# Patient Record
Sex: FEMALE | Race: WHITE | NOT HISPANIC OR LATINO | Employment: OTHER | URBAN - NONMETROPOLITAN AREA
[De-identification: names, ages, dates, MRNs, and addresses within clinical notes are randomized per-mention and may not be internally consistent; named-entity substitution may affect disease eponyms.]

---

## 2017-08-08 ENCOUNTER — TELEPHONE (OUTPATIENT)
Dept: MEDICAL GROUP | Facility: CLINIC | Age: 72
End: 2017-08-08

## 2017-08-08 ENCOUNTER — OFFICE VISIT (OUTPATIENT)
Dept: MEDICAL GROUP | Facility: CLINIC | Age: 72
End: 2017-08-08
Payer: MEDICARE

## 2017-08-08 VITALS
HEART RATE: 63 BPM | HEIGHT: 69 IN | DIASTOLIC BLOOD PRESSURE: 68 MMHG | TEMPERATURE: 98.8 F | BODY MASS INDEX: 26.66 KG/M2 | WEIGHT: 180 LBS | SYSTOLIC BLOOD PRESSURE: 126 MMHG | OXYGEN SATURATION: 94 %

## 2017-08-08 DIAGNOSIS — R73.03 PREDIABETES: ICD-10-CM

## 2017-08-08 DIAGNOSIS — Z76.89 ESTABLISHING CARE WITH NEW DOCTOR, ENCOUNTER FOR: ICD-10-CM

## 2017-08-08 DIAGNOSIS — F41.0 PANIC ATTACKS: ICD-10-CM

## 2017-08-08 DIAGNOSIS — Z23 NEED FOR VACCINATION: ICD-10-CM

## 2017-08-08 DIAGNOSIS — M85.88 OSTEOPENIA OF SPINE: ICD-10-CM

## 2017-08-08 DIAGNOSIS — G47.00 INSOMNIA, UNSPECIFIED TYPE: ICD-10-CM

## 2017-08-08 PROCEDURE — 90732 PPSV23 VACC 2 YRS+ SUBQ/IM: CPT | Performed by: NURSE PRACTITIONER

## 2017-08-08 PROCEDURE — 90715 TDAP VACCINE 7 YRS/> IM: CPT | Performed by: NURSE PRACTITIONER

## 2017-08-08 PROCEDURE — G0009 ADMIN PNEUMOCOCCAL VACCINE: HCPCS | Performed by: NURSE PRACTITIONER

## 2017-08-08 PROCEDURE — 90472 IMMUNIZATION ADMIN EACH ADD: CPT | Performed by: NURSE PRACTITIONER

## 2017-08-08 PROCEDURE — 99214 OFFICE O/P EST MOD 30 MIN: CPT | Mod: 25 | Performed by: NURSE PRACTITIONER

## 2017-08-08 NOTE — ASSESSMENT & PLAN NOTE
This is a chronic problem which is fairly well controlled with Prozac 40 mg daily and PRN Xanax, both prescribed by psychiatrist. Her  did pass away in April due to bone cancer, she reports she has been coping fairly well. She lives alone now, denies SI/HI.

## 2017-08-08 NOTE — TELEPHONE ENCOUNTER
Please call to advise patient I have reviewed her DEXA results from 2013. Showed osteopenia in spine, I have ordered new repeat testing to compare. In the meantime, continue with Ca and Vit D as we discussed.   Antonio Mooney, APRN

## 2017-08-08 NOTE — PATIENT INSTRUCTIONS
Please obtain fasting labs prior to your next appointment. You may report to our clinic here in [place] Monday-Friday from 7:00am - 9:00am.     Please arrive fasting. Please do not eat or drink anything other than water for 8 hours prior to your arrival for labs.      Your medical care was provided today by: DANIEL Terry    Thank You for the opportunity to serve you.    You may receive a brief survey in the mail shortly regarding your visit today. Please take a few moments to complete the survey and return it; no postage is necessary. We are working to serve our patient population better, improve customer service and our patients overall experience and your input can help us to accomplish this. We thank you for your help and for the opportunity to serve you today and in the future.     Special Instructions:  Always call 9-1-1 immediately if you develop a life threatening emergency.    Unless told otherwise please take all medications as directed and complete prescription therapies.     Watch for the following signs that require additional evaluation: progressive lethargy or unresponsiveness, localized pain (chest, abdomen), shortness of breath, painful breathing, progressive vomiting with weakness, bloody stools, or new rash.     If you are prescribed pain medication or any other medication that is sedating, do not take medication before or while operating a vehicle or heavy machinery or equipment due to potential side effects such as drowsiness and/or dizziness.

## 2017-08-08 NOTE — PROGRESS NOTES
Chief Complaint   Patient presents with   • Establish Care   • Orders Needed     tdap/pneumonia/bone density        Gabriella Mckee is a 71 y.o. female here today to establish care and to discuss the evaluation and management of:    HPI:      Insomnia  This is a chronic problem which patient reports is well controlled with Mirtazapine 15 mg nightly. This is prescribed by her psychiatrist whom she sees now every 6 months as she is quite stable on her med regime.     Panic attacks  This is a chronic problem which is fairly well controlled with Prozac 40 mg daily and PRN Xanax, both prescribed by psychiatrist. Her  did pass away in April due to bone cancer, she reports she has been coping fairly well. She lives alone now, denies SI/HI.     Prediabetes  Last A1c Jul 2016, 6.2. She has not had follow up since that time. Denies polyuria, polydipsia. She does admit that she likes sweets. Will recheck A1c.       Current medicines (including changes today)  Current Outpatient Prescriptions   Medication Sig Dispense Refill   • fluoxetine (PROZAC) 40 MG capsule Take 40 mg by mouth every day.     • vitamin D (CHOLECALCIFEROL) 1000 UNIT Tab Take 1,000 Units by mouth every day.     • alprazolam (XANAX) 0.5 MG Tab Take 0.5 mg by mouth at bedtime as needed for Sleep.     • mirtazapine (REMERON) 15 MG Tab Take 15 mg by mouth every evening.     • B Complex-C-E-Zn (STRESS B-COMPLEX/VIT C/ZINC PO) Take  by mouth.       No current facility-administered medications for this visit.       She  has a past medical history of Depression; Anxiety; and Panic attack.    She  has past surgical history that includes tubal coagulation laparoscopic bilateral and ganglion excision.     Social History   Substance Use Topics   • Smoking status: Former Smoker -- 1.00 packs/day for 10 years     Quit date: 07/06/1976   • Smokeless tobacco: None   • Alcohol Use: 0.0 oz/week     0 Standard drinks or equivalent per week      Comment: occasionally  "      Social History     Social History Narrative       Family History   Problem Relation Age of Onset   • Alcohol/Drug Mother        Family Status   Relation Status Death Age   • Mother     • Father       unknown        ROS   Constitutional: Denies fever, chills, or sweats  Eyes: negative for visual blurring, double vision, eye pain, floaters and discharge from eyes  ENT: negative for tinnitus, vertigo, frequent URI's, sinus trouble, persistent sore throat  Respiratory: negative for persistent cough, hemoptysis, dyspnea, wheezing  Cardiovascular: negative for palpitations, chest pain, or peripheral edema.  Gastrointestinal: negative for poor appetite, dysphagia, nausea, heartburn, abdominal pain, hemorrhoids, constipation or diarrhea  Genitourinary: negative for dysuria. negative for abnormal uterine bleeding, vaginal discharge   Musculoskeletal: negative for joint swelling and muscle pain/ soreness  Skin: negative for rash, scaling, hair or nail changes.  Neurologic: negative for migraine headaches, involuntary movements or tremor  Psychiatric: negative for excessive alcohol consumption or illegal drug usage, Positive for well controlled insomnia, depression, anxiety, panic attacks. No SI/HI.   Hematologic/Lymphatic/Immunologic: negative for unusual bruising, swollen glands  Endocrine: negative for temperature intolerance, polydipsia, polyuria, unintentional weight changes.        Objective:     Blood pressure 126/68, pulse 63, temperature 37.1 °C (98.8 °F), height 1.753 m (5' 9.02\"), weight 81.647 kg (180 lb), SpO2 94 %. Body mass index is 26.57 kg/(m^2).    Physical Exam:   Constitutional: Alert, no distress.  Eye: Equal, round and reactive, conjunctiva clear, lids normal.  ENMT: Lips without lesions, oropharynx clear.   Neck: Trachea midline, no masses, no thyromegaly. No cervical or supraclavicular lymphadenopathy.   Respiratory: Unlabored respiratory effort, lungs clear to auscultation, no wheezes, no " zahiraale.  Cardiovascular: Normal S1, S2, no murmur, no edema. Capillary refill < 2 seconds in UE bilaterally.   Abdomen: Soft, non-tender, no masses, no hepatosplenomegaly. Normal bowel sounds.   Skin: Warm, dry, good turgor, no rashes in visible areas.  Neuro: CN II-XII grossly intact, normal sensation in extremities.   Psych: Alert and oriented x3, normal affect and mood.      Assessment and Plan:   The following treatment plan was discussed    1. Establishing care with new doctor, encounter for  Reports she did have bone screening completed 2 years ago at [facility]. Will attempt to obtain records. She is taking Vit D. Discussed adding Ca. Denies any diagnosis of osteopenia/osteoporosis.     2. Prediabetes  Recheck labs. Will contact with results.   - LIPID PROFILE; Future  - TSH WITH REFLEX TO FT4; Future  - HEMOGLOBIN A1C; Future  - COMP METABOLIC PANEL; Future    3. Panic attacks  Stable, continue with psych.     4. Insomnia, unspecified type  Stable, continue with psych.     5. Need for vaccination  - Tdap =>8yo IM  - PNEUMOCOCCAL POLYSACCHARIDE VACCINE 23-VALENT =>3YO SQ/IM       Reviewed risks and benefits of treatment plan. Patient verbally agrees to plan of care.     Records requested.    Followup: Return in about 6 months (around 2/8/2018) for Lab follow up.    CELIA Krishnamurthy.     PLEASE NOTE: This dictation was created using voice recognition software. I have made every reasonable attempt to correct obvious errors, but I expect that there may be errors of grammar and possibly content that I did not discover prior finalizing this note.

## 2017-08-08 NOTE — ASSESSMENT & PLAN NOTE
Last A1c Jul 2016, 6.2. She has not had follow up since that time. Denies polyuria, polydipsia. She does admit that she likes sweets. Will recheck A1c.

## 2017-08-08 NOTE — ASSESSMENT & PLAN NOTE
This is a chronic problem which patient reports is well controlled with Mirtazapine 15 mg nightly. This is prescribed by her psychiatrist whom she sees now every 6 months as she is quite stable on her med regime.

## 2017-08-11 ENCOUNTER — NON-PROVIDER VISIT (OUTPATIENT)
Dept: MEDICAL GROUP | Facility: CLINIC | Age: 72
End: 2017-08-11
Payer: MEDICARE

## 2017-08-11 ENCOUNTER — HOSPITAL ENCOUNTER (OUTPATIENT)
Facility: MEDICAL CENTER | Age: 72
End: 2017-08-11
Attending: NURSE PRACTITIONER
Payer: MEDICARE

## 2017-08-11 DIAGNOSIS — R73.03 PREDIABETES: ICD-10-CM

## 2017-08-11 DIAGNOSIS — Z01.89 ROUTINE LAB DRAW: ICD-10-CM

## 2017-08-11 LAB — GFR SERPL CREATININE-BSD FRML MDRD: >60 ML/MIN/1.73 M 2

## 2017-08-11 PROCEDURE — 83036 HEMOGLOBIN GLYCOSYLATED A1C: CPT

## 2017-08-11 PROCEDURE — 80061 LIPID PANEL: CPT | Mod: GZ

## 2017-08-11 PROCEDURE — 99000 SPECIMEN HANDLING OFFICE-LAB: CPT | Performed by: NURSE PRACTITIONER

## 2017-08-11 PROCEDURE — 84443 ASSAY THYROID STIM HORMONE: CPT

## 2017-08-11 PROCEDURE — 80053 COMPREHEN METABOLIC PANEL: CPT

## 2017-08-11 PROCEDURE — 36415 COLL VENOUS BLD VENIPUNCTURE: CPT | Performed by: NURSE PRACTITIONER

## 2017-08-12 LAB
ALBUMIN SERPL BCP-MCNC: 3.7 G/DL (ref 3.2–4.9)
ALBUMIN/GLOB SERPL: 1.2 G/DL
ALP SERPL-CCNC: 75 U/L (ref 30–99)
ALT SERPL-CCNC: 18 U/L (ref 2–50)
ANION GAP SERPL CALC-SCNC: 10 MMOL/L (ref 0–11.9)
AST SERPL-CCNC: 19 U/L (ref 12–45)
BILIRUB SERPL-MCNC: 0.6 MG/DL (ref 0.1–1.5)
BUN SERPL-MCNC: 18 MG/DL (ref 8–22)
CALCIUM SERPL-MCNC: 9.1 MG/DL (ref 8.5–10.5)
CHLORIDE SERPL-SCNC: 109 MMOL/L (ref 96–112)
CHOLEST SERPL-MCNC: 179 MG/DL (ref 100–199)
CO2 SERPL-SCNC: 20 MMOL/L (ref 20–33)
CREAT SERPL-MCNC: 0.73 MG/DL (ref 0.5–1.4)
EST. AVERAGE GLUCOSE BLD GHB EST-MCNC: 131 MG/DL
GLOBULIN SER CALC-MCNC: 3.1 G/DL (ref 1.9–3.5)
GLUCOSE SERPL-MCNC: 111 MG/DL (ref 65–99)
HBA1C MFR BLD: 6.2 % (ref 0–5.6)
HDLC SERPL-MCNC: 47 MG/DL
LDLC SERPL CALC-MCNC: 112 MG/DL
POTASSIUM SERPL-SCNC: 4.1 MMOL/L (ref 3.6–5.5)
PROT SERPL-MCNC: 6.8 G/DL (ref 6–8.2)
SODIUM SERPL-SCNC: 139 MMOL/L (ref 135–145)
TRIGL SERPL-MCNC: 99 MG/DL (ref 0–149)
TSH SERPL DL<=0.005 MIU/L-ACNC: 1.01 UIU/ML (ref 0.3–3.7)

## 2017-12-15 ENCOUNTER — OFFICE VISIT (OUTPATIENT)
Dept: MEDICAL GROUP | Facility: CLINIC | Age: 72
End: 2017-12-15
Payer: MEDICARE

## 2017-12-15 VITALS
OXYGEN SATURATION: 96 % | WEIGHT: 185.8 LBS | TEMPERATURE: 98.6 F | DIASTOLIC BLOOD PRESSURE: 78 MMHG | HEIGHT: 69 IN | SYSTOLIC BLOOD PRESSURE: 130 MMHG | BODY MASS INDEX: 27.52 KG/M2 | HEART RATE: 78 BPM

## 2017-12-15 DIAGNOSIS — R42 VERTIGO: ICD-10-CM

## 2017-12-15 PROCEDURE — 99214 OFFICE O/P EST MOD 30 MIN: CPT | Performed by: FAMILY MEDICINE

## 2017-12-15 RX ORDER — MECLIZINE HYDROCHLORIDE 25 MG/1
25 TABLET ORAL 3 TIMES DAILY PRN
Qty: 30 TAB | Refills: 0 | Status: SHIPPED | OUTPATIENT
Start: 2017-12-15 | End: 2019-02-04

## 2017-12-15 RX ORDER — PHENOL 1.4 %
AEROSOL, SPRAY (ML) MUCOUS MEMBRANE
COMMUNITY
End: 2023-01-03

## 2017-12-15 ASSESSMENT — ENCOUNTER SYMPTOMS
CONSTITUTIONAL NEGATIVE: 1
FEVER: 0
PALPITATIONS: 0
MUSCULOSKELETAL NEGATIVE: 1
DIZZINESS: 1
PSYCHIATRIC NEGATIVE: 1
CONSTIPATION: 0
GASTROINTESTINAL NEGATIVE: 1
CHILLS: 0
NECK PAIN: 0
ABDOMINAL PAIN: 0
EYES NEGATIVE: 1
HEADACHES: 0
CARDIOVASCULAR NEGATIVE: 1
MYALGIAS: 0
COUGH: 0
HEMOPTYSIS: 0
RESPIRATORY NEGATIVE: 1

## 2017-12-15 ASSESSMENT — PATIENT HEALTH QUESTIONNAIRE - PHQ9: CLINICAL INTERPRETATION OF PHQ2 SCORE: 0

## 2017-12-15 NOTE — PROGRESS NOTES
Subjective:      Gabriella Mckee is a 72 y.o. female who presents with Vertigo            1. Vertigo  Had vertigo last week that seems to be resolving quickly  On exam with clear fluid behind both TM's and history of allergies will treat with antivert and rest  - Calcium Carbonate (CALCIUM 600) 600 MG Tab; Take  by mouth.  - meclizine (ANTIVERT) 25 MG Tab; Take 1 Tab by mouth 3 times a day as needed.  Dispense: 30 Tab; Refill: 0    Past Medical History:  No date: Anxiety  No date: Depression  No date: Panic attack  Past Surgical History:  No date: GANGLION EXCISION      Comment: R wrist   No date: TUBAL COAGULATION LAPAROSCOPIC BILATERAL  Smoking status: Former Smoker                                                              Packs/day: 1.00      Years: 10.00        Quit date: 7/6/1976  Smokeless tobacco: Never Used                      Alcohol use: Yes           0.0 oz/week     Comment: occasionally    Review of patient's family history indicates:    Alcohol/Drug                   Mother                      Current Outpatient Prescriptions: •  Calcium Carbonate (CALCIUM 600) 600 MG Tab, Take  by mouth., Disp: , Rfl: •  meclizine (ANTIVERT) 25 MG Tab, Take 1 Tab by mouth 3 times a day as needed., Disp: 30 Tab, Rfl: 0•  fluoxetine (PROZAC) 40 MG capsule, Take 40 mg by mouth every day., Disp: , Rfl: •  vitamin D (CHOLECALCIFEROL) 1000 UNIT Tab, Take 1,000 Units by mouth every day., Disp: , Rfl: •  mirtazapine (REMERON) 15 MG Tab, Take 15 mg by mouth every evening., Disp: , Rfl: •  B Complex-C-E-Zn (STRESS B-COMPLEX/VIT C/ZINC PO), Take  by mouth., Disp: , Rfl: •  alprazolam (XANAX) 0.5 MG Tab, Take 0.5 mg by mouth at bedtime as needed for Sleep., Disp: , Rfl:     Patient was instructed on the use of medications, either prescriptions or OTC and informed on when the appropriate follow up time period should be. In addition, patient was also instructed that should any acute worsening occur that they should notify this  "clinic asa or call 911.          Dizziness   This is a new problem. The current episode started in the past 7 days. The problem occurs intermittently. The problem has been rapidly improving. Pertinent negatives include no abdominal pain, chest pain, chills, coughing, fever, headaches, myalgias, neck pain or rash. The symptoms are aggravated by exertion. She has tried rest for the symptoms. The treatment provided mild relief.       Review of Systems   Constitutional: Negative.  Negative for chills and fever.        Past Medical History:  No date: Anxiety  No date: Depression  No date: Panic attack  Past Surgical History:  No date: GANGLION EXCISION      Comment: R wrist   No date: TUBAL COAGULATION LAPAROSCOPIC BILATERAL  Smoking status: Former Smoker                                                              Packs/day: 1.00      Years: 10.00        Quit date: 7/6/1976  Smokeless tobacco: Never Used                      Alcohol use: Yes           0.0 oz/week     Comment: occasionally    Review of patient's family history indicates:    Alcohol/Drug                   Mother                     HENT: Negative.    Eyes: Negative.    Respiratory: Negative.  Negative for cough and hemoptysis.    Cardiovascular: Negative.  Negative for chest pain and palpitations.   Gastrointestinal: Negative.  Negative for abdominal pain and constipation.   Genitourinary: Negative.  Negative for dysuria and urgency.   Musculoskeletal: Negative.  Negative for myalgias and neck pain.   Skin: Negative.  Negative for rash.   Neurological: Positive for dizziness. Negative for headaches.   Endo/Heme/Allergies: Negative.    Psychiatric/Behavioral: Negative.  Negative for suicidal ideas.          Objective:     /78   Pulse 78   Temp 37 °C (98.6 °F)   Ht 1.753 m (5' 9\")   Wt 84.3 kg (185 lb 12.8 oz)   SpO2 96%   BMI 27.44 kg/m²      Physical Exam   Constitutional: She is oriented to person, place, and time. She appears well-developed " and well-nourished. No distress.   HENT:   Head: Normocephalic and atraumatic.   Right Ear: A middle ear effusion is present.   Left Ear: A middle ear effusion is present.   Mouth/Throat: Oropharynx is clear and moist. No oropharyngeal exudate.   Eyes: Pupils are equal, round, and reactive to light.   Cardiovascular: Normal rate, regular rhythm, normal heart sounds and intact distal pulses.  Exam reveals no gallop and no friction rub.    No murmur heard.  Pulmonary/Chest: Effort normal and breath sounds normal. No respiratory distress. She has no wheezes. She has no rales. She exhibits no tenderness.   Neurological: She is alert and oriented to person, place, and time.   Skin: She is not diaphoretic.   Psychiatric: She has a normal mood and affect. Her behavior is normal. Judgment and thought content normal.   Nursing note and vitals reviewed.              Assessment/Plan:     1. Vertigo    - Calcium Carbonate (CALCIUM 600) 600 MG Tab; Take  by mouth.  - meclizine (ANTIVERT) 25 MG Tab; Take 1 Tab by mouth 3 times a day as needed.  Dispense: 30 Tab; Refill: 0

## 2018-03-17 ENCOUNTER — OFFICE VISIT (OUTPATIENT)
Dept: MEDICAL GROUP | Facility: CLINIC | Age: 73
End: 2018-03-17
Payer: MEDICARE

## 2018-03-17 VITALS
DIASTOLIC BLOOD PRESSURE: 70 MMHG | OXYGEN SATURATION: 96 % | TEMPERATURE: 98.7 F | HEART RATE: 60 BPM | RESPIRATION RATE: 16 BRPM | HEIGHT: 69 IN | SYSTOLIC BLOOD PRESSURE: 126 MMHG | BODY MASS INDEX: 27.77 KG/M2 | WEIGHT: 187.5 LBS

## 2018-03-17 DIAGNOSIS — R42 VERTIGO: ICD-10-CM

## 2018-03-17 DIAGNOSIS — J30.1 CHRONIC SEASONAL ALLERGIC RHINITIS DUE TO POLLEN: ICD-10-CM

## 2018-03-17 DIAGNOSIS — H65.04 RECURRENT ACUTE SEROUS OTITIS MEDIA OF RIGHT EAR: ICD-10-CM

## 2018-03-17 PROCEDURE — 99214 OFFICE O/P EST MOD 30 MIN: CPT | Performed by: PHYSICIAN ASSISTANT

## 2018-03-17 RX ORDER — MOMETASONE FUROATE 50 UG/1
2 SPRAY, METERED NASAL DAILY
Qty: 1 INHALER | Refills: 0 | Status: SHIPPED | OUTPATIENT
Start: 2018-03-17 | End: 2019-02-04

## 2018-03-17 RX ORDER — AMOXICILLIN 875 MG/1
875 TABLET, COATED ORAL 2 TIMES DAILY
Qty: 20 TAB | Refills: 0 | Status: SHIPPED | OUTPATIENT
Start: 2018-03-17 | End: 2018-03-27

## 2018-03-17 ASSESSMENT — ENCOUNTER SYMPTOMS
CHILLS: 0
TREMORS: 0
HEADACHES: 0
FEVER: 0
BLURRED VISION: 0
DOUBLE VISION: 0
LOSS OF CONSCIOUSNESS: 0
SEIZURES: 0
FOCAL WEAKNESS: 0
RHINORRHEA: 0
SORE THROAT: 0
SINUS PAIN: 0
COUGH: 0

## 2018-03-17 NOTE — PROGRESS NOTES
"Subjective:   Gabriella Mckee is a 72 y.o. female who presents for Otalgia (right ear)        Otalgia    There is pain in the right ear. This is a new problem. The current episode started more than 1 month ago. The problem occurs every few hours. The problem has been gradually worsening. There has been no fever. The pain is mild. Associated symptoms include ear discharge. Pertinent negatives include no coughing, headaches, hearing loss, rash, rhinorrhea or sore throat.   Pt was seen in December with sudden onset of Vertigo. Given Meclizine. Reports that the Vertigo has slowly improved but is not 100% resolved. When she tilts her head backwards she will become extrememly dizzy. Now over the past week or so she has begun to experience intermittent pain in the left ear with fullness. No real loss of hearing. Denies headaches, syncope or head injury.   Review of Systems   Constitutional: Negative for chills, fever and malaise/fatigue.   HENT: Positive for congestion, ear discharge and ear pain. Negative for hearing loss, rhinorrhea, sinus pain, sore throat and tinnitus.         Pt reports that she is struggling with nasal congestion which she feels is related to allergies. She thinks she is allergic to grass. She has horses and is exposed to grass frequently.    Eyes: Negative for blurred vision and double vision.   Respiratory: Negative for cough.    Cardiovascular: Negative for chest pain.   Skin: Negative for rash.   Neurological: Negative for tremors, focal weakness, seizures, loss of consciousness and headaches.   All other systems reviewed and are negative.    No Known Allergies   Objective:   /70   Pulse 60   Temp 37.1 °C (98.7 °F)   Resp 16   Ht 1.753 m (5' 9\")   Wt 85 kg (187 lb 8 oz)   SpO2 96%   BMI 27.69 kg/m²   Physical Exam   Constitutional: She is oriented to person, place, and time. She appears well-developed and well-nourished.   HENT:   Head: Normocephalic and atraumatic.   Right Ear: " Hearing, external ear and ear canal normal. Tympanic membrane is retracted.   Left Ear: Hearing, external ear and ear canal normal.   Nose: Nose normal.   Mouth/Throat: Oropharynx is clear and moist. No oropharyngeal exudate.     Right TM dull, retracted with 75% yellow jordan AFL   Eyes: Conjunctivae and EOM are normal. Pupils are equal, round, and reactive to light.   Neck: Normal range of motion. Neck supple.   Cardiovascular: Normal rate, regular rhythm and normal heart sounds.  Exam reveals no gallop and no friction rub.    No murmur heard.  Pulmonary/Chest: Effort normal and breath sounds normal. No respiratory distress. She has no wheezes. She has no rales.   Abdominal: Soft. Bowel sounds are normal. She exhibits no distension and no mass. There is no tenderness. There is no rebound and no guarding.   Musculoskeletal: Normal range of motion. She exhibits no edema, tenderness or deformity.   Lymphadenopathy:     She has no cervical adenopathy.   Neurological: She is alert and oriented to person, place, and time. She has normal strength and normal reflexes. No cranial nerve deficit or sensory deficit. She displays a negative Romberg sign. Coordination and gait normal.   Skin: Skin is warm and dry. No rash noted.   Psychiatric: She has a normal mood and affect. Judgment normal.         Assessment/Plan:   Assessment    1. Recurrent acute serous otitis media of right ear  - REFERRAL TO ENT  Amoxicillin 875 mg bid x 10 days.     2. Vertigo  - REFERRAL TO ENT    3. Chronic seasonal allergic rhinitis due to pollen    Nasonex nasal spray 2 sprays each nostril qd.   Add OTC Zytrec, claritin or allegra to regimen.    Differential diagnosis, natural history, supportive care, and indications for immediate follow-up discussed.

## 2018-03-17 NOTE — PATIENT INSTRUCTIONS
Vertigo  Introduction  Vertigo means that you feel like you are moving when you are not. Vertigo can also make you feel like things around you are moving when they are not. This feeling can come and go at any time. Vertigo often goes away on its own.  Follow these instructions at home:  · Avoid making fast movements.  · Avoid driving.  · Avoid using heavy machinery.  · Avoid doing any task or activity that might cause danger to you or other people if you would have a vertigo attack while you are doing it.  · Sit down right away if you feel dizzy or have trouble with your balance.  · Take over-the-counter and prescription medicines only as told by your doctor.  · Follow instructions from your doctor about which positions or movements you should avoid.  · Drink enough fluid to keep your pee (urine) clear or pale yellow.  · Keep all follow-up visits as told by your doctor. This is important.  Contact a doctor if:  · Medicine does not help your vertigo.  · You have a fever.  · Your problems get worse or you have new symptoms.  · Your family or friends see changes in your behavior.  · You feel sick to your stomach (nauseous) or you throw up (vomit).  · You have a “pins and needles” feeling or you are numb in part of your body.  Get help right away if:  · You have trouble moving or talking.  · You are always dizzy.  · You pass out (faint).  · You get very bad headaches.  · You feel weak or have trouble using your hands, arms, or legs.  · You have changes in your hearing.  · You have changes in your seeing (vision).  · You get a stiff neck.  · Bright light starts to bother you.  This information is not intended to replace advice given to you by your health care provider. Make sure you discuss any questions you have with your health care provider.  Document Released: 09/26/2009 Document Revised: 05/25/2017 Document Reviewed: 04/11/2016  © 2017 Elsevier  Serous Otitis Media  Serous otitis media is fluid in the middle ear  space. This space contains the bones for hearing and air. Air in the middle ear space helps to transmit sound.   The air gets there through the eustachian tube. This tube goes from the back of the nose (nasopharynx) to the middle ear space. It keeps the pressure in the middle ear the same as the outside world. It also helps to drain fluid from the middle ear space.  CAUSES   Serous otitis media occurs when the eustachian tube gets blocked. Blockage can come from:  · Ear infections.  · Colds and other upper respiratory infections.  · Allergies.  · Irritants such as cigarette smoke.  · Sudden changes in air pressure (such as descending in an airplane).  · Enlarged adenoids.  · A mass in the nasopharynx.  During colds and upper respiratory infections, the middle ear space can become temporarily filled with fluid. This can happen after an ear infection also. Once the infection clears, the fluid will generally drain out of the ear through the eustachian tube. If it does not, then serous otitis media occurs.  SIGNS AND SYMPTOMS   · Hearing loss.  · A feeling of fullness in the ear, without pain.  · Young children may not show any symptoms but may show slight behavioral changes, such as agitation, ear pulling, or crying.  DIAGNOSIS   Serous otitis media is diagnosed by an ear exam. Tests may be done to check on the movement of the eardrum. Hearing exams may also be done.  TREATMENT   The fluid most often goes away without treatment. If allergy is the cause, allergy treatment may be helpful. Fluid that persists for several months may require minor surgery. A small tube is placed in the eardrum to:  · Drain the fluid.  · Restore the air in the middle ear space.  In certain situations, antibiotic medicines are used to avoid surgery. Surgery may be done to remove enlarged adenoids (if this is the cause).  HOME CARE INSTRUCTIONS   · Keep children away from tobacco smoke.  · Keep all follow-up visits as directed by your health  care provider.  SEEK MEDICAL CARE IF:   · Your hearing is not better in 3 months.  · Your hearing is worse.  · You have ear pain.  · You have drainage from the ear.  · You have dizziness.  · You have serous otitis media only in one ear or have any bleeding from your nose (epistaxis).  · You notice a lump on your neck.  MAKE SURE YOU:  · Understand these instructions.    · Will watch your condition.    · Will get help right away if you are not doing well or get worse.    This information is not intended to replace advice given to you by your health care provider. Make sure you discuss any questions you have with your health care provider.  Document Released: 03/09/2005 Document Revised: 01/08/2016 Document Reviewed: 07/15/2014  Elsevier Interactive Patient Education © 2017 [employer]

## 2018-05-18 ENCOUNTER — APPOINTMENT (OUTPATIENT)
Dept: SCHEDULING | Facility: IMAGING CENTER | Age: 73
End: 2018-05-18
Payer: MEDICARE

## 2019-01-30 ENCOUNTER — TELEPHONE (OUTPATIENT)
Dept: HEALTH INFORMATION MANAGEMENT | Facility: OTHER | Age: 74
End: 2019-01-30

## 2019-01-30 DIAGNOSIS — M85.89 OSTEOPENIA OF MULTIPLE SITES: ICD-10-CM

## 2019-01-30 NOTE — TELEPHONE ENCOUNTER
This patient is overdue for health maintenance topics that need orders so she can complete them.     Please review the pended orders in  to sign or make adjustments as appropriate.    Close the encounter when complete.  If declining these orders, please document a reason and route to the Outreach MA pool (p AMB  Outreach).  I will call the patient to cancel the appointment.

## 2019-02-01 NOTE — TELEPHONE ENCOUNTER
I have not seen this patient in over a year, please call to schedule an appt. Thank you.   DANIEL Terry

## 2019-02-04 ENCOUNTER — OFFICE VISIT (OUTPATIENT)
Dept: MEDICAL GROUP | Facility: CLINIC | Age: 74
End: 2019-02-04
Payer: MEDICARE

## 2019-02-04 VITALS
DIASTOLIC BLOOD PRESSURE: 84 MMHG | HEART RATE: 75 BPM | OXYGEN SATURATION: 95 % | BODY MASS INDEX: 27.55 KG/M2 | WEIGHT: 186 LBS | TEMPERATURE: 99 F | HEIGHT: 69 IN | RESPIRATION RATE: 14 BRPM | SYSTOLIC BLOOD PRESSURE: 128 MMHG

## 2019-02-04 DIAGNOSIS — G47.00 INSOMNIA, UNSPECIFIED TYPE: ICD-10-CM

## 2019-02-04 DIAGNOSIS — F41.0 PANIC ATTACKS: ICD-10-CM

## 2019-02-04 DIAGNOSIS — Z28.20 VACCINE REFUSED BY PATIENT: ICD-10-CM

## 2019-02-04 DIAGNOSIS — F33.1 MODERATE EPISODE OF RECURRENT MAJOR DEPRESSIVE DISORDER (HCC): ICD-10-CM

## 2019-02-04 DIAGNOSIS — Z00.00 MEDICARE ANNUAL WELLNESS VISIT, SUBSEQUENT: Primary | ICD-10-CM

## 2019-02-04 DIAGNOSIS — N39.3 STRESS INCONTINENCE: ICD-10-CM

## 2019-02-04 DIAGNOSIS — M85.89 OSTEOPENIA OF MULTIPLE SITES: ICD-10-CM

## 2019-02-04 DIAGNOSIS — R73.03 PREDIABETES: ICD-10-CM

## 2019-02-04 PROCEDURE — G0439 PPPS, SUBSEQ VISIT: HCPCS | Performed by: NURSE PRACTITIONER

## 2019-02-04 RX ORDER — NAPROXEN 500 MG/1
TABLET ORAL
Refills: 0 | COMMUNITY
Start: 2018-12-03 | End: 2023-01-03

## 2019-02-04 RX ORDER — AMOXICILLIN 500 MG/1
CAPSULE ORAL
Refills: 0 | COMMUNITY
Start: 2018-12-03 | End: 2019-02-04

## 2019-02-04 ASSESSMENT — ENCOUNTER SYMPTOMS: GENERAL WELL-BEING: GOOD

## 2019-02-04 ASSESSMENT — PATIENT HEALTH QUESTIONNAIRE - PHQ9: CLINICAL INTERPRETATION OF PHQ2 SCORE: 0

## 2019-02-04 ASSESSMENT — ACTIVITIES OF DAILY LIVING (ADL): BATHING_REQUIRES_ASSISTANCE: 0

## 2019-02-04 NOTE — ASSESSMENT & PLAN NOTE
This is a chronic problem which is fairly well controlled with as needed Xanax use, prescribed by psychiatrist.  She reports that typically driving is 1 of her triggers.  Denies any SI/HI.

## 2019-02-04 NOTE — ASSESSMENT & PLAN NOTE
This is a chronic problem which is well controlled with mirtazapine 15 mg nightly.  She is prescribed by her psychiatrist whom she sees every 6 months, reports she is stable.

## 2019-02-04 NOTE — PATIENT INSTRUCTIONS
Your medical care was provided today by: DANIEL Terry    Thank You for the opportunity to serve you.    You may receive a brief survey in the mail shortly regarding your visit today. Please take a few moments to complete the survey and return it; no postage is necessary. We are working to serve our patient population better, improve customer service and our patients overall experience and your input can help us to accomplish this. We thank you for your help and for the opportunity to serve you today and in the future.     Labs and Diagnostic Testing   Please note that if we have ordered labs or diagnostic testing, those results may be released to you on [employer] prior to my review. While we do our best to review your results as soon as possible, there are times when you may see your results prior to provider review. Of course, if you ever have any questions or concerns about your results, please contact our clinic.     Special Instructions:  Always call 9-1-1 immediately if you develop a life threatening emergency.    Unless told otherwise please take all medications as directed and complete prescription therapies.     Watch for the following signs that require additional evaluation: progressive lethargy or unresponsiveness, localized pain (chest, abdomen), shortness of breath, painful breathing, progressive vomiting with weakness, bloody stools, or new rash.     If you are prescribed pain medication or any other medication that is sedating, do not take medication before or while operating a vehicle or heavy machinery or equipment due to potential side effects such as drowsiness and/or dizziness.

## 2019-02-04 NOTE — ASSESSMENT & PLAN NOTE
This is a new problem as of a few months ago.  Patient reports that she utilizes poise pads.  Denies any dysuria, blood in urine.

## 2019-02-04 NOTE — NON-PROVIDER
Chief Complaint   Patient presents with   • Annual Exam         HPI:  Gabriella Mckee is a 73 y.o. here for Medicare Annual Wellness Visit     Patient Active Problem List    Diagnosis Date Noted   • Panic attacks 07/06/2016   • Insomnia 07/06/2016   • Prediabetes 07/06/2016       Current Outpatient Prescriptions   Medication Sig Dispense Refill   • Calcium Carbonate (CALCIUM 600) 600 MG Tab Take  by mouth.     • fluoxetine (PROZAC) 40 MG capsule Take 40 mg by mouth every day.     • vitamin D (CHOLECALCIFEROL) 1000 UNIT Tab Take 1,000 Units by mouth every day.     • alprazolam (XANAX) 0.5 MG Tab Take 0.5 mg by mouth at bedtime as needed for Sleep.     • B Complex-C-E-Zn (STRESS B-COMPLEX/VIT C/ZINC PO) Take  by mouth.     • amoxicillin (AMOXIL) 500 MG Cap   0   • naproxen (NAPROSYN) 500 MG Tab   0   • mometasone (NASONEX) 50 MCG/ACT nasal spray Spray 2 Sprays in nose every day. (Patient not taking: Reported on 2/4/2019) 1 Inhaler 0   • meclizine (ANTIVERT) 25 MG Tab Take 1 Tab by mouth 3 times a day as needed. (Patient not taking: Reported on 2/4/2019) 30 Tab 0   • mirtazapine (REMERON) 15 MG Tab Take 15 mg by mouth every evening.       No current facility-administered medications for this visit.             Current supplements as per medication list.       Allergies: Patient has no known allergies.    Current social contact/activities:     She  reports that she quit smoking about 42 years ago. She has a 10.00 pack-year smoking history. She has never used smokeless tobacco. She reports that she drinks alcohol. She reports that she does not use drugs.  Counseling given: Not Answered      DPA/Advanced Directive:  Patient has Living Will, but it is not on file. Instructed to bring in a copy to scan into their chart.    ROS:    Gait: Uses no assistive device  Ostomy: No  Other tubes: No  Amputations: No  Chronic oxygen use: No  Last eye exam: march 2018  Wears hearing aids: No   : Denies any urinary leakage during the  last 6 months    Screening:    Depression Screening    Little interest or pleasure in doing things?  0 - not at all  Feeling down, depressed , or hopeless? 0 - not at all  Patient Health Questionnaire Score: 0     If depressive symptoms identified deferred to follow up visit unless specifically addressed in assessment and plan.    Interpretation of PHQ-9 Total Score   Score Severity   1-4 No Depression   5-9 Mild Depression   10-14 Moderate Depression   15-19 Moderately Severe Depression   20-27 Severe Depression    Screening for Cognitive Impairment    Three Minute Recall (leader, season, table) 2/3    Regna clock face with all 12 numbers and set the hands to show 10 past 11.  Yes    Cognitive concerns identified deferred for follow up unless specifically addressed in assessment and plan.    Fall Risk Assessment    Has the patient had two or more falls in the last year or any fall with injury in the last year?  No    Safety Assessment    Throw rugs on floor.  No  Handrails on all stairs.  Yes  Good lighting in all hallways.  Yes  Difficulty hearing.  Yes  Patient counseled about all safety risks that were identified.    Functional Assessment ADLs    Are there any barriers preventing you from cooking for yourself or meeting nutritional needs?  No.    Are there any barriers preventing you from driving safely or obtaining transportation?  No.    Are there any barriers preventing you from using a telephone or calling for help?  No.    Are there any barriers preventing you from shopping?  No.    Are there any barriers preventing you from taking care of your own finances?  No.    Are there any barriers preventing you from managing your medications?  No.    Are there any barriers preventing you from showering, bathing or dressing yourself?  No.    Are you currently engaging in any exercise or physical activity?  Yes.     What is your perception of your health?  Good.      Health Maintenance Summary                IMM  "ZOSTER VACCINES Overdue 8/4/2016      Done 6/9/2016 Imm Admin: Zoster Vaccine Live (ZVL) (Zostavax)    MAMMOGRAM Overdue 9/13/2017      Done 9/13/2016 [id number]    BONE DENSITY Overdue 4/4/2018      Done 4/4/2013 DS-BONE DENSITY STUDY (DEXA)    Annual Wellness Visit Overdue 8/9/2018      Done 8/8/2017 Prob Dx: Medicare annual wellness visit, subsequent     Patient has more history with this topic...    IMM INFLUENZA Overdue 9/1/2018      Done 10/22/2013 Ext Imm: Influenza, High Dose    COLONOSCOPY Next Due 9/25/2021      Done 9/25/2016 REFERRAL TO GI FOR COLONOSCOPY    IMM DTaP/Tdap/Td Vaccine Next Due 8/8/2027      Done 8/8/2017 Imm Admin: Tdap Vaccine          Patient Care Team:  JOHNATHON Krishnamurthy as PCP - General (Family Medicine)        Social History   Substance Use Topics   • Smoking status: Former Smoker     Packs/day: 1.00     Years: 10.00     Quit date: 7/6/1976   • Smokeless tobacco: Never Used   • Alcohol use 0.0 oz/week      Comment: occasionally     Family History   Problem Relation Age of Onset   • Alcohol/Drug Mother      She  has a past medical history of Anxiety; Depression; and Panic attack.   Past Surgical History:   Procedure Laterality Date   • GANGLION EXCISION      R wrist    • TUBAL COAGULATION LAPAROSCOPIC BILATERAL         Exam:   Blood pressure 128/84, pulse 75, temperature 37.2 °C (99 °F), temperature source Temporal, resp. rate 14, height 1.753 m (5' 9\"), weight 84.4 kg (186 lb), SpO2 95 %. Body mass index is 27.47 kg/m².    Hearing good.    Dentition good  Alert, oriented in no acute distress.  Eye contact is good, speech goal directed, affect calm    Assessment and Plan. The following treatment and monitoring plan is recommended:    No diagnosis found.      Services suggested: No services needed at this time  Health Care Screening: Age-appropriate preventive services recommended by USPTF and ACIP covered by Medicare were discussed today. Services ordered if " indicated and agreed upon by the patient.  Referrals offered: Community-based lifestyle interventions to reduce health risks and promote self-management and wellness, fall prevention, nutrition, physical activity, tobacco-use cessation, weight loss, and mental health services as per orders if indicated.    Discussion today about general wellness and lifestyle habits:    · Prevent falls and reduce trip hazards; Cautioned about securing or removing rugs.  · Have a working fire alarm and carbon monoxide detector;   · Engage in regular physical activity and social activities     Follow-up: No Follow-up on file.

## 2019-02-04 NOTE — PROGRESS NOTES
Chief Complaint   Patient presents with   • Annual Exam         HPI:  Gabriella is a 73 y.o. here for Medicare Annual Wellness Visit     Moderate episode of recurrent major depressive disorder (HCC)  This is a chronic problem which is well controlled with fluoxetine 40 mg daily.  She is prescribed and followed by a psychiatrist every 6 months.    Insomnia  This is a chronic problem which is well controlled with mirtazapine 15 mg nightly.  She is prescribed by her psychiatrist whom she sees every 6 months, reports she is stable.    Osteopenia of multiple sites  Patient is currently scheduled for her DEXA scan, her last was in 2013.    Panic attacks  This is a chronic problem which is fairly well controlled with as needed Xanax use, prescribed by psychiatrist.  She reports that typically driving is 1 of her triggers.  Denies any SI/HI.    Prediabetes  Last A1c August 2017, this was stable at the time at 6.2.  She denies any polyuria, polydipsia.  She is due for recheck.    Stress incontinence  This is a new problem as of a few months ago.  Patient reports that she utilizes poise pads.  Denies any dysuria, blood in urine.      Patient Active Problem List    Diagnosis Date Noted   • Moderate episode of recurrent major depressive disorder (HCC) 02/04/2019   • Stress incontinence 02/04/2019   • Osteopenia of multiple sites 02/04/2019   • Panic attacks 07/06/2016   • Insomnia 07/06/2016   • Prediabetes 07/06/2016       Current Outpatient Prescriptions   Medication Sig Dispense Refill   • Calcium Carbonate (CALCIUM 600) 600 MG Tab Take  by mouth.     • fluoxetine (PROZAC) 40 MG capsule Take 40 mg by mouth every day.     • alprazolam (XANAX) 0.5 MG Tab Take 0.5 mg by mouth at bedtime as needed for Sleep.     • mirtazapine (REMERON) 15 MG Tab Take 15 mg by mouth every evening.     • B Complex-C-E-Zn (STRESS B-COMPLEX/VIT C/ZINC PO) Take  by mouth.     • naproxen (NAPROSYN) 500 MG Tab   0     No current facility-administered  medications for this visit.             Current supplements as per medication list.       Allergies: Patient has no known allergies.    Current social contact/activities: she does enjoy walking her dog every morning, she also runs a local horse cludb.      She    reports that she quit smoking about 42 years ago. She has a 10.00 pack-year smoking history. She has never used smokeless tobacco. She reports that she drinks alcohol. She reports that she does not use drugs.  Counseling given: Yes        DPA/Advanced directive: Patient does have an advanced directive. If not on file, instructed to bring in a copy to scan into their chart. If no advanced directive exists, a packet and workshop information was given on advanced directives.     ROS:    Gait: Uses no assistive device   Ostomy: no   Other tubes: no   Amputations: no   Chronic oxygen use no   Last eye exam 2018, got new glasses at the time    : Reports incontinence.       Screening:    Depression Screening    Little interest or pleasure in doing things?  0 - not at all  Feeling down, depressed , or hopeless? 0 - not at all  Patient Health Questionnaire Score: 0     If depressive symptoms identified deferred to follow up visit unless specifically addressed in assessment and plan.    Interpretation of PHQ-9 Total Score   Score Severity   1-4 No Depression   5-9 Mild Depression   10-14 Moderate Depression   15-19 Moderately Severe Depression   20-27 Severe Depression    Screening for Cognitive Impairment    Three Minute Recall (leader, season, table) 2/3    Regan clock face with all 12 numbers and set the hands to show 10 past 11.  Yes    Cognitive concerns identified deferred for follow up unless specifically addressed in assessment and plan.    Fall Risk Assessment    Has the patient had two or more falls in the last year or any fall with injury in the last year?  No    Safety Assessment    Throw rugs on floor.  No  Handrails on all stairs.  Yes  Good lighting  in all hallways.  Yes  Difficulty hearing.  Yes  Patient counseled about all safety risks that were identified.    Functional Assessment ADLs    Are there any barriers preventing you from cooking for yourself or meeting nutritional needs?  No.    Are there any barriers preventing you from driving safely or obtaining transportation?  No.    Are there any barriers preventing you from using a telephone or calling for help?  No.    Are there any barriers preventing you from shopping?  No.    Are there any barriers preventing you from taking care of your own finances?  No.    Are there any barriers preventing you from managing your medications?  No.    Are there any barriers preventing you from showering, bathing or dressing yourself?  No.    Are you currently engaging in any exercise or physical activity?  Yes.     What is your perception of your health?  Good.      Health Maintenance Summary                MAMMOGRAM Overdue 9/13/2017      Done 9/13/2016 [id number]    BONE DENSITY Overdue 4/4/2018      Done 4/4/2013 DS-BONE DENSITY STUDY (DEXA)    Annual Wellness Visit Overdue 8/9/2018      Done 8/8/2017 Prob Dx: Medicare annual wellness visit, subsequent     Patient has more history with this topic...    IMM ZOSTER VACCINES Postponed 7/1/2019 Originally 8/4/2016. System: vaccine not available, other system reasons     Done 6/9/2016 Imm Admin: Zoster Vaccine Live (ZVL) (Zostavax)    IMM INFLUENZA Postponed 9/1/2019 Originally 9/1/2018. Patient Refused     Done 10/22/2013 Ext Imm: Influenza, High Dose    COLONOSCOPY Next Due 9/25/2021      Done 9/25/2016 REFERRAL TO GI FOR COLONOSCOPY    IMM DTaP/Tdap/Td Vaccine Next Due 8/8/2027      Done 8/8/2017 Imm Admin: Tdap Vaccine          Patient Care Team:  JOHNATHON Krishnamurthy as PCP - General (Family Medicine)        Social History   Substance Use Topics   • Smoking status: Former Smoker     Packs/day: 1.00     Years: 10.00     Quit date: 7/6/1976   •  "Smokeless tobacco: Never Used   • Alcohol use 0.0 oz/week      Comment: occasionally     Family History   Problem Relation Age of Onset   • Alcohol/Drug Mother      She  has a past medical history of Anxiety; Depression; and Panic attack.   Past Surgical History:   Procedure Laterality Date   • GANGLION EXCISION      R wrist    • TUBAL COAGULATION LAPAROSCOPIC BILATERAL         Exam:     Blood pressure 128/84, pulse 75, temperature 37.2 °C (99 °F), temperature source Temporal, resp. rate 14, height 1.753 m (5' 9\"), weight 84.4 kg (186 lb), SpO2 95 %. Body mass index is 27.47 kg/m².    Hearing good.    Dentition fair  Alert, oriented in no acute distress.  Eye contact is good, speech goal directed, affect calm      Assessment and Plan. The following treatment and monitoring plan is recommended:    1. Medicare annual wellness visit, subsequent  Subsequent Annual Wellness Visit - Includes PPPS ()   2. Insomnia, unspecified type  Subsequent Annual Wellness Visit - Includes PPPS ()   3. Moderate episode of recurrent major depressive disorder (HCC)  Lipid Profile    TSH    COMP METABOLIC PANEL    ESTIMATED GFR    Subsequent Annual Wellness Visit - Includes PPPS ()   4. Panic attacks  Subsequent Annual Wellness Visit - Includes PPPS ()   5. Prediabetes  HEMOGLOBIN A1C    Subsequent Annual Wellness Visit - Includes PPPS ()   6. Stress incontinence  Subsequent Annual Wellness Visit - Includes PPPS ()   7. Vaccine refused by patient  Subsequent Annual Wellness Visit - Includes PPPS ()   8. Osteopenia of multiple sites  Subsequent Annual Wellness Visit - Includes PPPS ()       1. Medicare annual wellness visit, subsequent  - Subsequent Annual Wellness Visit - Includes PPPS ()    2. Insomnia, unspecified type  Stable   - Subsequent Annual Wellness Visit - Includes PPPS ()    3. Moderate episode of recurrent major depressive disorder (HCC)  Stable, advised to complete fasting " labs.   - Lipid Profile; Future  - TSH; Future  - COMP METABOLIC PANEL; Future  - ESTIMATED GFR; Future  - Subsequent Annual Wellness Visit - Includes PPPS ()    4. Panic attacks  Stable   - Subsequent Annual Wellness Visit - Includes PPPS ()    5. Prediabetes  Will repeat labs   - HEMOGLOBIN A1C; Future  - Subsequent Annual Wellness Visit - Includes PPPS ()    6. Stress incontinence  Stable   - Subsequent Annual Wellness Visit - Includes PPPS ()    7. Vaccine refused by patient  Declines flu vaccine   - Subsequent Annual Wellness Visit - Includes PPPS ()    8. Osteopenia of multiple sites  She is already scheduled for her DEXA scan.   - Subsequent Annual Wellness Visit - Includes PPPS ()     Patient reports she is also scheduled for a Mammogram.     Services needed: as per orders if indicated.  Health Care Screening: Age-appropriate preventive services Medicare covers discussed today and ordered if indicated.    Referrals offered: Community-based lifestyle interventions to reduce health risks and promote self-management and wellness, fall prevention, nutrition, physical activity, tobacco-use cessation, weight loss, and mental health services as per orders if indicated.    Discussion today about general wellness and lifestyle habits:    · Prevent falls and reduce trip hazards; Cautioned about securing or removing rugs.  · Have a working fire alarm and carbon monoxide detector;   · Engage in regular physical activity and social activities       Follow-up: Return in about 1 year (around 2/4/2020).    Chief Complaint   Patient presents with   • Annual Exam

## 2019-02-04 NOTE — ASSESSMENT & PLAN NOTE
This is a chronic problem which is well controlled with fluoxetine 40 mg daily.  She is prescribed and followed by a psychiatrist every 6 months.

## 2019-02-12 ENCOUNTER — HOSPITAL ENCOUNTER (OUTPATIENT)
Facility: MEDICAL CENTER | Age: 74
End: 2019-02-12
Attending: NURSE PRACTITIONER
Payer: MEDICARE

## 2019-02-12 ENCOUNTER — NON-PROVIDER VISIT (OUTPATIENT)
Dept: MEDICAL GROUP | Facility: CLINIC | Age: 74
End: 2019-02-12
Payer: MEDICARE

## 2019-02-12 DIAGNOSIS — R73.03 PREDIABETES: ICD-10-CM

## 2019-02-12 DIAGNOSIS — F33.1 MODERATE EPISODE OF RECURRENT MAJOR DEPRESSIVE DISORDER (HCC): ICD-10-CM

## 2019-02-12 LAB
ALBUMIN SERPL BCP-MCNC: 4 G/DL (ref 3.2–4.9)
ALBUMIN/GLOB SERPL: 1.4 G/DL
ALP SERPL-CCNC: 63 U/L (ref 30–99)
ALT SERPL-CCNC: 14 U/L (ref 2–50)
ANION GAP SERPL CALC-SCNC: 7 MMOL/L (ref 0–11.9)
AST SERPL-CCNC: 18 U/L (ref 12–45)
BILIRUB SERPL-MCNC: 0.4 MG/DL (ref 0.1–1.5)
BUN SERPL-MCNC: 21 MG/DL (ref 8–22)
CALCIUM SERPL-MCNC: 8.9 MG/DL (ref 8.5–10.5)
CHLORIDE SERPL-SCNC: 108 MMOL/L (ref 96–112)
CHOLEST SERPL-MCNC: 167 MG/DL (ref 100–199)
CO2 SERPL-SCNC: 25 MMOL/L (ref 20–33)
CREAT SERPL-MCNC: 0.72 MG/DL (ref 0.5–1.4)
EST. AVERAGE GLUCOSE BLD GHB EST-MCNC: 131 MG/DL
GLOBULIN SER CALC-MCNC: 2.8 G/DL (ref 1.9–3.5)
GLUCOSE SERPL-MCNC: 113 MG/DL (ref 65–99)
HBA1C MFR BLD: 6.2 % (ref 0–5.6)
HDLC SERPL-MCNC: 47 MG/DL
LDLC SERPL CALC-MCNC: 98 MG/DL
POTASSIUM SERPL-SCNC: 4 MMOL/L (ref 3.6–5.5)
PROT SERPL-MCNC: 6.8 G/DL (ref 6–8.2)
SODIUM SERPL-SCNC: 140 MMOL/L (ref 135–145)
TRIGL SERPL-MCNC: 110 MG/DL (ref 0–149)
TSH SERPL DL<=0.005 MIU/L-ACNC: 1.03 UIU/ML (ref 0.38–5.33)

## 2019-02-12 PROCEDURE — 83036 HEMOGLOBIN GLYCOSYLATED A1C: CPT

## 2019-02-12 PROCEDURE — 84443 ASSAY THYROID STIM HORMONE: CPT

## 2019-02-12 PROCEDURE — 36415 COLL VENOUS BLD VENIPUNCTURE: CPT | Performed by: PHYSICIAN ASSISTANT

## 2019-02-12 PROCEDURE — 80053 COMPREHEN METABOLIC PANEL: CPT

## 2019-02-12 PROCEDURE — 99000 SPECIMEN HANDLING OFFICE-LAB: CPT | Performed by: PHYSICIAN ASSISTANT

## 2019-02-12 PROCEDURE — 80061 LIPID PANEL: CPT | Mod: GZ

## 2019-02-14 ENCOUNTER — PATIENT MESSAGE (OUTPATIENT)
Dept: MEDICAL GROUP | Facility: CLINIC | Age: 74
End: 2019-02-14

## 2019-02-14 DIAGNOSIS — R73.03 PREDIABETES: ICD-10-CM

## 2019-02-14 NOTE — PATIENT COMMUNICATION
Component      Latest Ref Rng & Units 7/13/2016 8/11/2017 2/12/2019   Glycohemoglobin      0.0 - 5.6 % 6.2 (H) 6.2 (H) 6.2 (H)   Estim. Avg Glu      mg/dL 131 131 131

## 2019-02-14 NOTE — TELEPHONE ENCOUNTER
From: Gabriella Mckee  To: JOHNATHON Krishnamurthy  Sent: 2/14/2019 7:51 AM PST  Subject: Test Result Question    I have a question about Hemoglobin A1C resulted on 2/12/19, 11:44 PM.    Can I change what I eat to bring down my sugar. I don't want to go on medication

## 2019-02-19 ENCOUNTER — HOSPITAL ENCOUNTER (OUTPATIENT)
Dept: RADIOLOGY | Facility: MEDICAL CENTER | Age: 74
End: 2019-02-19

## 2019-02-19 ENCOUNTER — HOSPITAL ENCOUNTER (OUTPATIENT)
Dept: RADIOLOGY | Facility: MEDICAL CENTER | Age: 74
End: 2019-02-19
Attending: NURSE PRACTITIONER
Payer: MEDICARE

## 2019-02-19 DIAGNOSIS — M85.89 OSTEOPENIA OF MULTIPLE SITES: ICD-10-CM

## 2019-02-19 DIAGNOSIS — Z12.31 SCREENING MAMMOGRAM, ENCOUNTER FOR: ICD-10-CM

## 2019-02-19 PROCEDURE — 77080 DXA BONE DENSITY AXIAL: CPT

## 2019-02-19 PROCEDURE — 77063 BREAST TOMOSYNTHESIS BI: CPT

## 2019-04-01 ENCOUNTER — OFFICE VISIT (OUTPATIENT)
Dept: MEDICAL GROUP | Facility: CLINIC | Age: 74
End: 2019-04-01
Payer: MEDICARE

## 2019-04-01 VITALS
RESPIRATION RATE: 16 BRPM | SYSTOLIC BLOOD PRESSURE: 128 MMHG | OXYGEN SATURATION: 96 % | TEMPERATURE: 98 F | BODY MASS INDEX: 26.96 KG/M2 | WEIGHT: 182 LBS | HEIGHT: 69 IN | HEART RATE: 71 BPM | DIASTOLIC BLOOD PRESSURE: 82 MMHG

## 2019-04-01 DIAGNOSIS — M85.89 OSTEOPENIA OF MULTIPLE SITES: ICD-10-CM

## 2019-04-01 PROCEDURE — 99214 OFFICE O/P EST MOD 30 MIN: CPT | Performed by: NURSE PRACTITIONER

## 2019-04-01 NOTE — PATIENT INSTRUCTIONS
Vit D 1000 units daily   Calcium 400-600 mg daily       Osteoporosis  Introduction  Osteoporosis happens when your bones become thinner and weaker. Weak bones can break (fracture) more easily when you slip or fall. Bones most at risk of breaking are in the hip, wrist, and spine.  Follow these instructions at home:  · Get enough calcium and vitamin D. These nutrients are good for your bones.  · Exercise as told by your doctor.  · Do not use any tobacco products. This includes cigarettes, chewing tobacco, and electronic cigarettes. If you need help quitting, ask your doctor.  · Limit the amount of alcohol you drink.  · Take medicines only as told by your doctor.  · Keep all follow-up visits as told by your doctor. This is important.  · Take care at home to prevent falls. Some ways to do this are:  ¨ Keep rooms well lit and tidy.  ¨ Put safety rails on your stairs.  ¨ Put a rubber mat in the bathroom and other places that are often wet or slippery.  Get help right away if:  · You fall.  · You hurt yourself.  This information is not intended to replace advice given to you by your health care provider. Make sure you discuss any questions you have with your health care provider.  Document Released: 03/11/2013 Document Revised: 05/25/2017 Document Reviewed: 05/28/2015  © 2017 [employer]      Your medical care was provided today by: DANIEL Terry    Thank You for the opportunity to serve you.    You may receive a brief survey in the mail shortly regarding your visit today. Please take a few moments to complete the survey and return it; no postage is necessary. We are working to serve our patient population better, improve customer service and our patients overall experience and your input can help us to accomplish this. We thank you for your help and for the opportunity to serve you today and in the future.     Labs and Diagnostic Testing   Please note that if we have ordered labs or diagnostic testing, those results  may be released to you on [employer] prior to my review. While we do our best to review your results as soon as possible, there are times when you may see your results prior to provider review. Of course, if you ever have any questions or concerns about your results, please contact our clinic.     Special Instructions:  Always call 9-1-1 immediately if you develop a life threatening emergency.    Unless told otherwise please take all medications as directed and complete prescription therapies.     Watch for the following signs that require additional evaluation: progressive lethargy or unresponsiveness, localized pain (chest, abdomen), shortness of breath, painful breathing, progressive vomiting with weakness, bloody stools, or new rash.     If you are prescribed pain medication or any other medication that is sedating, do not take medication before or while operating a vehicle or heavy machinery or equipment due to potential side effects such as drowsiness and/or dizziness.

## 2019-04-01 NOTE — PROGRESS NOTES
Subjective:     Gabriella Mckee is a 73 y.o. female here today for evaluation of the following:    Osteopenia of multiple sites  Patient's last DEXA scan in 2017 showed osteopenia.  Most recent test was completed, patient is here to review results.  She is not currently taking vitamin D supplementation.  She does however take calcium.  Patient reports she tries to stay active, denies any fracture or fall.       Current medicines (including changes today)  Current Outpatient Prescriptions   Medication Sig Dispense Refill   • naproxen (NAPROSYN) 500 MG Tab   0   • Calcium Carbonate (CALCIUM 600) 600 MG Tab Take  by mouth.     • fluoxetine (PROZAC) 40 MG capsule Take 40 mg by mouth every day.     • alprazolam (XANAX) 0.5 MG Tab Take 0.5 mg by mouth at bedtime as needed for Sleep.     • mirtazapine (REMERON) 15 MG Tab Take 15 mg by mouth every evening.     • B Complex-C-E-Zn (STRESS B-COMPLEX/VIT C/ZINC PO) Take  by mouth.       No current facility-administered medications for this visit.        She  has a past medical history of Anxiety; Depression; and Panic attack.    She  has a past surgical history that includes tubal coagulation laparoscopic bilateral and ganglion excision.     Social History     Social History   • Marital status:      Spouse name: N/A   • Number of children: N/A   • Years of education: N/A     Social History Main Topics   • Smoking status: Former Smoker     Packs/day: 1.00     Years: 10.00     Quit date: 7/6/1976   • Smokeless tobacco: Never Used   • Alcohol use 0.0 oz/week      Comment: occasionally   • Drug use: No   • Sexual activity: No     Other Topics Concern   • Not on file     Social History Narrative   • No narrative on file       Family History   Problem Relation Age of Onset   • Alcohol/Drug Mother          ROS  No fever, no chest pain, no shortness of breath, no abdominal pain, no rashes    All other systems reviewed and are negative.        Objective:     Blood pressure  "128/82, pulse 71, temperature 36.7 °C (98 °F), temperature source Temporal, resp. rate 16, height 1.753 m (5' 9\"), weight 82.6 kg (182 lb), SpO2 96 %. Body mass index is 26.88 kg/m².    Physical Exam:   Constitutional: Alert, no distress.  Respiratory: Unlabored respiratory effort, lungs clear to auscultation, no wheezes, no ronchi.  Cardiovascular: Normal S1, S2, no murmur, no edema.   Skin: Warm, dry, good turgor, no rashes in visible areas.  Psych: Alert and oriented x3, normal affect and mood.        Assessment and Plan:   The following treatment plan was discussed    1. Osteopenia of multiple sites  Discussed with patient her recent lab results, printed all results and reviewed with patient.  Answered all patient questions.  Also discussed recommended follow-up for DEXA scan in 2 years.  Discussed with patient vitamin D and calcium supplementation.  Also discussed the importance of weightbearing exercise.  Patient verbalized understanding.    Total 30 minutes face-to-face time spent with patient, with greater than 50% of the total time discussing patient's issues and symptoms as listed above in assessment and plan, as well as managing coordination of care for future evaluation and treatment.      Reviewed risks and benefits of treatment plan. Patient verbally agrees to plan of care.       Followup: Return in about 1 year (around 4/1/2020).    CELIA Krishnamurthy.     PLEASE NOTE: This dictation was created using voice recognition software. I have made every reasonable attempt to correct obvious errors, but I expect that there may be errors of grammar and possibly content that I did not discover prior finalizing this note.          "

## 2019-04-01 NOTE — ASSESSMENT & PLAN NOTE
Patient's last DEXA scan in 2017 showed osteopenia.  Most recent test was completed, patient is here to review results.  She is not currently taking vitamin D supplementation.  She does however take calcium.  Patient reports she tries to stay active, denies any fracture or fall.

## 2019-05-13 ENCOUNTER — OFFICE VISIT (OUTPATIENT)
Dept: MEDICAL GROUP | Facility: CLINIC | Age: 74
End: 2019-05-13
Payer: MEDICARE

## 2019-05-13 VITALS
WEIGHT: 183 LBS | SYSTOLIC BLOOD PRESSURE: 128 MMHG | RESPIRATION RATE: 14 BRPM | OXYGEN SATURATION: 97 % | HEART RATE: 70 BPM | DIASTOLIC BLOOD PRESSURE: 74 MMHG | BODY MASS INDEX: 27.11 KG/M2 | TEMPERATURE: 98.7 F | HEIGHT: 69 IN

## 2019-05-13 DIAGNOSIS — M85.89 OSTEOPENIA OF MULTIPLE SITES: ICD-10-CM

## 2019-05-13 DIAGNOSIS — M25.551 PAIN OF BOTH HIP JOINTS: ICD-10-CM

## 2019-05-13 DIAGNOSIS — M25.552 PAIN OF BOTH HIP JOINTS: ICD-10-CM

## 2019-05-13 PROCEDURE — 99214 OFFICE O/P EST MOD 30 MIN: CPT | Performed by: NURSE PRACTITIONER

## 2019-05-13 NOTE — PROGRESS NOTES
Subjective:     Gabriella Mckee is a 73 y.o. female here today for evaluation and management the following:    Pain of both hip joints  This is a new problem. Patient reports she has pain, more on the right, recently. She reports most of her pain is when she lays down at night to sleep and worse when she gets up in the morning. She reports pain decreases as the goes on. She is taking motrin which does help, once daily. She has been gardening more and she thinks this may be the cause of her pain. Denies any fall. She does do exercises daily and also walks daily. She also does stretching daily.     Osteopenia of multiple sites  DEXA scan in 2017 showed osteopenia.  Most recent test completed, minimally worsening osteopenia.  Patient is currently taking vitamin D supplementation and calcium.  She also stays active, denies any fracture or fall.    2/19/2019 1:24 PM    HISTORY/REASON FOR EXAM:  Postmenopausal    TECHNIQUE/EXAM DESCRIPTION AND NUMBER OF VIEWS:  Dual x-ray bone densitometry was performed from the L1 through L4 levels and from the proximal left femur utilizing the GE Prodigy unit.    COMPARISON:   None    FINDINGS:  The mean bone mineral density for the lumbar spine is 0.955 g/cm2, which corresponds to a T score of -1.9 and a Z score of -0.2.    The proximal left femur has a mean bone mineral density of 0.897 g/cm2, with a T score of -0.9 and a Z score of 0.7.   Impression       According to the World Health Organization classification, bone mineral density of this patient is osteopenic.        10-year Probability of Fracture:  Major Osteoporotic     9.3%  Hip     1.1%  Population      [place] ()    Based on left femur neck BMD              Current medicines (including changes today)  Current Outpatient Prescriptions   Medication Sig Dispense Refill   • naproxen (NAPROSYN) 500 MG Tab   0   • Calcium Carbonate (CALCIUM 600) 600 MG Tab Take  by mouth.     • fluoxetine (PROZAC) 40 MG capsule Take 40 mg by  "mouth every day.     • alprazolam (XANAX) 0.5 MG Tab Take 0.5 mg by mouth at bedtime as needed for Sleep.     • mirtazapine (REMERON) 15 MG Tab Take 15 mg by mouth every evening.     • B Complex-C-E-Zn (STRESS B-COMPLEX/VIT C/ZINC PO) Take  by mouth.       No current facility-administered medications for this visit.        She  has a past medical history of Anxiety; Depression; and Panic attack.    She  has a past surgical history that includes tubal coagulation laparoscopic bilateral and ganglion excision.     Social History     Social History   • Marital status:      Spouse name: N/A   • Number of children: N/A   • Years of education: N/A     Social History Main Topics   • Smoking status: Former Smoker     Packs/day: 1.00     Years: 10.00     Quit date: 7/6/1976   • Smokeless tobacco: Never Used   • Alcohol use 0.0 oz/week      Comment: occasionally   • Drug use: No   • Sexual activity: No     Other Topics Concern   • Not on file     Social History Narrative   • No narrative on file       Family History   Problem Relation Age of Onset   • Alcohol/Drug Mother          ROS  Positive for bilateral hip pain, worse on right.  No fever, no chest pain, no shortness of breath, no abdominal pain, no rashes    All other systems reviewed and are negative.        Objective:     /74 (BP Location: Left arm, Patient Position: Sitting, BP Cuff Size: Adult)   Pulse 70   Temp 37.1 °C (98.7 °F) (Temporal)   Resp 14   Ht 1.753 m (5' 9\")   Wt 83 kg (183 lb)   SpO2 97%  Body mass index is 27.02 kg/m².    Physical Exam:   Constitutional: Alert, no distress.  Eye: Equal, round and reactive, conjunctiva clear, lids normal.   ENMT: Lips without lesions, oropharynx clear.   Neck: Trachea midline, no masses, no thyromegaly. No cervical or supraclavicular lymphadenopathy  Respiratory: Unlabored respiratory effort, lungs clear to auscultation, no wheezes, no ronchi.  Cardiovascular: Normal S1, S2, no murmur, no edema. "   Abdomen: Soft, non-tender, no masses, no hepatosplenomegaly. Normal bowel sounds.    Psych: Alert and oriented x3, normal affect and mood.    HIP Exam:      Gait: Normal  Patient is not using any assistive device today.      ROM: Abduction:  45 deg                Adduction:  25 deg                Internal Rotation:  45 deg                External Rotation:  45 deg     Straight leg raise:   Normal bilaterally without pain   Neuro: Alert, Oriented X 3, cooperative, moving all extremities. Full/symmetrical motor strength. Normal/symmetrical DTR's. Normal proximal/distal sensory in extremities.  SKIN: No rashes, redness, heat or signs of infection noted at joint.     RADIOGRAPHS: pending       Assessment and Plan:   The following treatment plan was discussed    1. Pain of both hip joints  Discussed with patient conservative management.  She continues to stay active.  Also discussed importance of continued vitamin D and calcium supplementation.  Light stretching and light weightbearing exercise.  Will obtain x-ray to rule out any bony abnormality.  Advised patient to utilize Motrin as needed for pain as this appears to be working, however, cautioned against overuse.  Discussed the use of ice and heat.  Patient will follow-up in 2 to 3 weeks.  Discussed signs or symptoms to seek more emergent care.  - DX-HIP-BILATERAL-WITH PELVIS-3/4 VIEWS; Future    2. Osteopenia of multiple sites    Reviewed risks and benefits of treatment plan. Patient verbally agrees to plan of care.       Followup: Return in about 3 weeks (around 6/3/2019).    CELIA Krishnamurthy.     PLEASE NOTE: This dictation was created using voice recognition software. I have made every reasonable attempt to correct obvious errors, but I expect that there may be errors of grammar and possibly content that I did not discover prior finalizing this note.

## 2019-05-13 NOTE — ASSESSMENT & PLAN NOTE
This is a new problem. Patient reports she has pain, more on the right, recently. She reports most of her pain is when she lays down at night to sleep and worse when she gets up in the morning. She reports pain decreases as the goes on. She is taking motrin which does help, once daily. She has been gardening more and she thinks this may be the cause of her pain. Denies any fall. She does do exercises daily and also walks daily. She also does stretching daily.

## 2019-05-13 NOTE — ASSESSMENT & PLAN NOTE
DEXA scan in 2017 showed osteopenia.  Most recent test completed, minimally worsening osteopenia.  Patient is currently taking vitamin D supplementation and calcium.  She also stays active, denies any fracture or fall.    2/19/2019 1:24 PM    HISTORY/REASON FOR EXAM:  Postmenopausal    TECHNIQUE/EXAM DESCRIPTION AND NUMBER OF VIEWS:  Dual x-ray bone densitometry was performed from the L1 through L4 levels and from the proximal left femur utilizing the GE Prodigy unit.    COMPARISON:   None    FINDINGS:  The mean bone mineral density for the lumbar spine is 0.955 g/cm2, which corresponds to a T score of -1.9 and a Z score of -0.2.    The proximal left femur has a mean bone mineral density of 0.897 g/cm2, with a T score of -0.9 and a Z score of 0.7.   Impression       According to the World Health Organization classification, bone mineral density of this patient is osteopenic.        10-year Probability of Fracture:  Major Osteoporotic     9.3%  Hip     1.1%  Population      [place] ()    Based on left femur neck BMD

## 2019-05-13 NOTE — PATIENT INSTRUCTIONS
Hip Exercises  Ask your health care provider which exercises are safe for you. Do exercises exactly as told by your health care provider and adjust them as directed. It is normal to feel mild stretching, pulling, tightness, or discomfort as you do these exercises, but you should stop right away if you feel sudden pain or your pain gets worse. Do not begin these exercises until told by your health care provider.  STRETCHING AND RANGE OF MOTION EXERCISES   These exercises warm up your muscles and joints and improve the movement and flexibility of your hip. These exercises also help to relieve pain, numbness, and tingling.  Exercise A: Hamstrings, Supine   1. Lie on your back.  2. Loop a belt or towel over the ball of your left / right foot. The ball of your foot is on the walking surface, right under your toes.  3. Straighten your left / right knee and slowly pull on the belt to raise your leg.  ¨ Do not let your left / right knee bend while you do this.  ¨ Keep your other leg flat on the floor.  ¨ Raise the left / right leg until you feel a gentle stretch behind your left / right knee or thigh.  4. Hold this position for __________ seconds.  5. Slowly return your leg to the starting position.  Repeat __________ times. Complete this stretch __________ times a day.  Exercise B: Hip Rotators   1. Lie on your back on a firm surface.  2. Hold your left / right knee with your left / right hand. Hold your ankle with your other hand.  3. Gently pull your left / right knee and rotate your lower leg toward your other shoulder.  ¨ Pull until you feel a stretch in your buttocks.  ¨ Keep your hips and shoulders firmly planted while you do this stretch.  4. Hold this position for __________ seconds.  Repeat __________ times. Complete this stretch __________ times a day.  Exercise C: V-Sit (Hamstrings and Adductors)   1. Sit on the floor with your legs extended in a large “V” shape. Keep your knees straight during this  exercise.  2. Start with your head and chest upright, then bend at your waist to reach for your left foot (position A). You should feel a stretch in your right inner thigh.  3. Hold this position for __________ seconds. Then slowly return to the upright position.  4. Bend at your waist to reach forward (position B). You should feel a stretch behind both of your thighs and knees.  5. Hold this position for __________ seconds. Then slowly return to the upright position.  6. Bend at your waist to reach for your right foot (position C). You should feel a stretch in your left inner thigh.  7. Hold this position for __________ seconds. Then slowly return to the upright position.  Repeat __________ times. Complete this stretch __________ times a day.  Exercise D: Lunge (Hip Flexors)   1. Place your left / right knee on the floor and bend your other knee so that is directly over your ankle. You should be half-kneeling.  2. Keep good posture with your head over your shoulders.  3. Tighten your buttocks to point your tailbone downward. This helps your back to keep from arching too much.  4. You should feel a gentle stretch in the front of your left / right thigh and hip. If you do not feel any resistance, slightly slide your other foot forward and then slowly lunge forward so your knee once again lines up over your ankle.  5. Make sure your tailbone continues to point downward.  6. Hold this position for __________ seconds.  Repeat __________ times. Complete this stretch __________ times a day.  STRENGTHENING EXERCISES   These exercises build strength and endurance in your hip. Endurance is the ability to use your muscles for a long time, even after they get tired.  Exercise E: Bridge (Hip Extensors)   1. Lie on your back on a firm surface with your knees bent and your feet flat on the floor.  2. Tighten your buttocks muscles and lift your bottom off the floor until the trunk of your body is level with your thighs.  ¨ Do not  arch your back.  ¨ You should feel the muscles working in your buttocks and the back of your thighs. If you do not feel these muscles, slide your feet 1-2 inches (2.5-5 cm) farther away from your buttocks.  3. Hold this position for __________ seconds.  4. Slowly lower your hips to the starting position.  5. Let your muscles relax completely between repetitions.  6. If this exercise is too easy, try doing it with your arms crossed over your chest.  Repeat __________ times. Complete this exercise __________ times a day.  Exercise F: Straight Leg Raises - Hip Abductors   1. Lie on your side with your left / right leg in the top position. Lie so your head, shoulder, knee, and hip line up with each other. You may bend your bottom knee to help you balance.  2. Roll your hips slightly forward, so your hips are stacked directly over each other and your left / right knee is facing forward.  3. Leading with your heel, lift your top leg 4-6 inches (10-15 cm). You should feel the muscles in your outer hip lifting.  ¨ Do not let your foot drift forward.  ¨ Do not let your knee roll toward the ceiling.  4. Hold this position for __________ seconds.  5. Slowly return to the starting position.  6. Let your muscles relax completely between repetitions.  Repeat __________ times. Complete this exercise __________ times a day.  Exercise G: Straight Leg Raises - Hip Adductors   1. Lie on your side with your left / right leg in the bottom position. Lie so your head, shoulder, knee, and hip line up. You may place your upper foot in front to help you balance.  2. Roll your hips slightly forward, so your hips are stacked directly over each other and your left / right knee is facing forward.  3. Tense the muscles in your inner thigh and lift your bottom leg 4-6 inches (10-15 cm).  4. Hold this position for __________ seconds.  5. Slowly return to the starting position.  6. Let your muscles relax completely between repetitions.  Repeat  "__________ times. Complete this exercise __________ times a day.  Exercise H: Straight Leg Raises - Quadriceps   1. Lie on your back with your left / right leg extended and your other knee bent.  2. Tense the muscles in the front of your left / right thigh. When you do this, you should see your kneecap slide up or see increased dimpling just above your knee.  3. Tighten these muscles even more and raise your leg 4-6 inches (10-15 cm) off the floor.  4. Hold this position for __________ seconds.  5. Keep these muscles tense as you lower your leg.  6. Relax the muscles slowly and completely between repetitions.  Repeat __________ times. Complete this exercise __________ times a day.  Exercise I: Hip Abductors, Standing  1. Tie one end of a rubber exercise band or tubing to a secure surface, such as a table or pole.  2. Loop the other end of the band or tubing around your left / right ankle.  3. Keeping your ankle with the band or tubing directly opposite of the secured end, step away until there is tension in the tubing or band. Hold onto a chair as needed for balance.  4. Lift your left / right leg out to your side. While you do this:  ¨ Keep your back upright.  ¨ Keep your shoulders over your hips.  ¨ Keep your toes pointing forward.  ¨ Make sure to use your hip muscles to lift your leg. Do not \"throw\" your leg or tip your body to lift your leg.  5. Hold this position for __________ seconds.  6. Slowly return to the starting position.  Repeat __________ times. Complete this exercise __________ times a day.  Exercise J: Squats (Quadriceps)  1.  a door frame so your feet and knees are in line with the frame. You may place your hands on the frame for balance.  2. Slowly bend your knees and lower your hips like you are going to sit in a chair.  ¨ Keep your lower legs in a straight-up-and-down position.  ¨ Do not let your hips go lower than your knees.  ¨ Do not bend your knees lower than told by your health care " provider.  ¨ If your hip pain increases, do not bend as low.  3. Hold this position for ___________ seconds.  4. Slowly push with your legs to return to standing. Do not use your hands to pull yourself to standing.  Repeat __________ times. Complete this exercise __________ times a day.  This information is not intended to replace advice given to you by your health care provider. Make sure you discuss any questions you have with your health care provider.  Document Released: 01/05/2007 Document Revised: 09/11/2017 Document Reviewed: 12/12/2016  [employer] Interactive Patient Education © 2017 [employer]    Hip Pain  Your hip is the joint between your upper legs and your lower pelvis. The bones, cartilage, tendons, and muscles of your hip joint perform a lot of work each day supporting your body weight and allowing you to move around.  Hip pain can range from a minor ache to severe pain in one or both of your hips. Pain may be felt on the inside of the hip joint near the groin, or the outside near the buttocks and upper thigh. You may have swelling or stiffness as well.  Follow these instructions at home:  · Take medicines only as directed by your health care provider.  · Apply ice to the injured area:  ¨ Put ice in a plastic bag.  ¨ Place a towel between your skin and the bag.  ¨ Leave the ice on for 15-20 minutes at a time, 3-4 times a day.  · Keep your leg raised (elevated) when possible to lessen swelling.  · Avoid activities that cause pain.  · Follow specific exercises as directed by your health care provider.  · Sleep with a pillow between your legs on your most comfortable side.  · Record how often you have hip pain, the location of the pain, and what it feels like.  Contact a health care provider if:  · You are unable to put weight on your leg.  · Your hip is red or swollen or very tender to touch.  · Your pain or swelling continues or worsens after 1 week.  · You have increasing difficulty walking.  · You  have a fever.  Get help right away if:  · You have fallen.  · You have a sudden increase in pain and swelling in your hip.  This information is not intended to replace advice given to you by your health care provider. Make sure you discuss any questions you have with your health care provider.  Document Released: 06/07/2011 Document Revised: 05/25/2017 Document Reviewed: 08/14/2014  [employer] Interactive Patient Education © 2017 [employer]      Your medical care was provided today by: DANIEL Terry    Thank You for the opportunity to serve you.    You may receive a brief survey in the mail shortly regarding your visit today. Please take a few moments to complete the survey and return it; no postage is necessary. We are working to serve our patient population better, improve customer service and our patients overall experience and your input can help us to accomplish this. We thank you for your help and for the opportunity to serve you today and in the future.     Labs and Diagnostic Testing   Please note that if we have ordered labs or diagnostic testing, those results may be released to you on [facility] prior to my review. While we do our best to review your results as soon as possible, there are times when you may see your results prior to provider review. Of course, if you ever have any questions or concerns about your results, please contact our clinic.     Special Instructions:  Always call 9-1-1 immediately if you develop a life threatening emergency.    Unless told otherwise please take all medications as directed and complete prescription therapies.     Watch for the following signs that require additional evaluation: progressive lethargy or unresponsiveness, localized pain (chest, abdomen), shortness of breath, painful breathing, progressive vomiting with weakness, bloody stools, or new rash.     If you are prescribed pain medication or any other medication that is sedating, do not take medication  before or while operating a vehicle or heavy machinery or equipment due to potential side effects such as drowsiness and/or dizziness.

## 2019-05-14 ENCOUNTER — APPOINTMENT (OUTPATIENT)
Dept: RADIOLOGY | Facility: IMAGING CENTER | Age: 74
End: 2019-05-14
Attending: NURSE PRACTITIONER
Payer: MEDICARE

## 2019-05-14 ENCOUNTER — NON-PROVIDER VISIT (OUTPATIENT)
Dept: URGENT CARE | Facility: PHYSICIAN GROUP | Age: 74
End: 2019-05-14
Payer: MEDICARE

## 2019-05-14 DIAGNOSIS — M25.551 PAIN OF BOTH HIP JOINTS: ICD-10-CM

## 2019-05-14 DIAGNOSIS — M25.552 PAIN OF BOTH HIP JOINTS: ICD-10-CM

## 2019-05-14 DIAGNOSIS — M25.552 BILATERAL HIP PAIN: ICD-10-CM

## 2019-05-14 DIAGNOSIS — M25.551 BILATERAL HIP PAIN: ICD-10-CM

## 2019-05-14 PROCEDURE — 73522 X-RAY EXAM HIPS BI 3-4 VIEWS: CPT | Mod: TC,FY | Performed by: PHYSICIAN ASSISTANT

## 2019-06-06 ENCOUNTER — OFFICE VISIT (OUTPATIENT)
Dept: MEDICAL GROUP | Facility: CLINIC | Age: 74
End: 2019-06-06
Payer: MEDICARE

## 2019-06-06 VITALS
WEIGHT: 183 LBS | SYSTOLIC BLOOD PRESSURE: 140 MMHG | DIASTOLIC BLOOD PRESSURE: 76 MMHG | BODY MASS INDEX: 27.11 KG/M2 | OXYGEN SATURATION: 96 % | RESPIRATION RATE: 14 BRPM | HEIGHT: 69 IN | HEART RATE: 77 BPM | TEMPERATURE: 98.2 F

## 2019-06-06 DIAGNOSIS — M25.552 PAIN OF BOTH HIP JOINTS: ICD-10-CM

## 2019-06-06 DIAGNOSIS — M25.551 PAIN OF BOTH HIP JOINTS: ICD-10-CM

## 2019-06-06 DIAGNOSIS — M85.89 OSTEOPENIA OF MULTIPLE SITES: ICD-10-CM

## 2019-06-06 PROCEDURE — 99213 OFFICE O/P EST LOW 20 MIN: CPT | Performed by: NURSE PRACTITIONER

## 2019-06-06 NOTE — PATIENT INSTRUCTIONS
Hip Pain  Your hip is the joint between your upper legs and your lower pelvis. The bones, cartilage, tendons, and muscles of your hip joint perform a lot of work each day supporting your body weight and allowing you to move around.  Hip pain can range from a minor ache to severe pain in one or both of your hips. Pain may be felt on the inside of the hip joint near the groin, or the outside near the buttocks and upper thigh. You may have swelling or stiffness as well.  Follow these instructions at home:  · Take medicines only as directed by your health care provider.  · Apply ice to the injured area:  ¨ Put ice in a plastic bag.  ¨ Place a towel between your skin and the bag.  ¨ Leave the ice on for 15-20 minutes at a time, 3-4 times a day.  · Keep your leg raised (elevated) when possible to lessen swelling.  · Avoid activities that cause pain.  · Follow specific exercises as directed by your health care provider.  · Sleep with a pillow between your legs on your most comfortable side.  · Record how often you have hip pain, the location of the pain, and what it feels like.  Contact a health care provider if:  · You are unable to put weight on your leg.  · Your hip is red or swollen or very tender to touch.  · Your pain or swelling continues or worsens after 1 week.  · You have increasing difficulty walking.  · You have a fever.  Get help right away if:  · You have fallen.  · You have a sudden increase in pain and swelling in your hip.  This information is not intended to replace advice given to you by your health care provider. Make sure you discuss any questions you have with your health care provider.  Document Released: 06/07/2011 Document Revised: 05/25/2017 Document Reviewed: 08/14/2014  [employer] Patient Education © 2017 [employer]    Your medical care was provided today by: DANIEL Terry    Thank You for the opportunity to serve you.    You may receive a brief survey in the mail shortly  regarding your visit today. Please take a few moments to complete the survey and return it; no postage is necessary. We are working to serve our patient population better, improve customer service and our patients overall experience and your input can help us to accomplish this. We thank you for your help and for the opportunity to serve you today and in the future.     Labs and Diagnostic Testing   Please note that if we have ordered labs or diagnostic testing, those results may be released to you on [employer] prior to my review. While we do our best to review your results as soon as possible, there are times when you may see your results prior to provider review. Of course, if you ever have any questions or concerns about your results, please contact our clinic.     Special Instructions:  Always call 9-1-1 immediately if you develop a life threatening emergency.    Unless told otherwise please take all medications as directed and complete prescription therapies.     Watch for the following signs that require additional evaluation: progressive lethargy or unresponsiveness, localized pain (chest, abdomen), shortness of breath, painful breathing, progressive vomiting with weakness, bloody stools, or new rash.     If you are prescribed pain medication or any other medication that is sedating, do not take medication before or while operating a vehicle or heavy machinery or equipment due to potential side effects such as drowsiness and/or dizziness.

## 2019-06-06 NOTE — PROGRESS NOTES
Subjective:     Gabriella Mckee is a 73 y.o. female here today for xray follow up     Osteopenia of multiple sites  DEXA scan in 2017 showed osteopenia.  Most recent test completed, minimally worsening osteopenia.  Patient is currently taking vitamin D supplementation and calcium.  She also stays active, denies any fracture or fall.    2/19/2019 1:24 PM    HISTORY/REASON FOR EXAM:  Postmenopausal    TECHNIQUE/EXAM DESCRIPTION AND NUMBER OF VIEWS:  Dual x-ray bone densitometry was performed from the L1 through L4 levels and from the proximal left femur utilizing the GE Prodigy unit.    COMPARISON:   None    FINDINGS:  The mean bone mineral density for the lumbar spine is 0.955 g/cm2, which corresponds to a T score of -1.9 and a Z score of -0.2.    The proximal left femur has a mean bone mineral density of 0.897 g/cm2, with a T score of -0.9 and a Z score of 0.7.   Impression       According to the World Health Organization classification, bone mineral density of this patient is osteopenic.        10-year Probability of Fracture:  Major Osteoporotic     9.3%  Hip     1.1%  Population      [place] ()    Based on left femur neck BMD         Pain of both hip joints  This is a new problem. Patient reports she has pain, more on the right, recently. She reports most of her pain is when she lays down at night to sleep and worse when she gets up in the morning. She reports pain decreases as the goes on. She is taking motrin which does help, once daily. She has been gardening more and she thinks this may be the cause of her pain. Denies any fall. She does do exercises daily and also walks daily. She also does stretching daily.     5/14/2019 9:12 AM    HISTORY/REASON FOR EXAM:  Bilateral hip pain.      TECHNIQUE/EXAM DESCRIPTION AND NUMBER OF VIEWS:  3 views of the bilateral hip.    COMPARISON: None    FINDINGS:  There is no evidence of acute fracture involving the pelvis. No proximal femoral fracture is identified.  There  is no evidence of femoral head flattening. No femoral head deformity is identified. No bone erosions are appreciated.  There is mild hip joint space narrowing.   Impression       1.  No radiographic evidence of acute traumatic injury.    2.  Findings are consistent with mild osteoarthritis.              Current medicines (including changes today)  Current Outpatient Prescriptions   Medication Sig Dispense Refill   • naproxen (NAPROSYN) 500 MG Tab   0   • Calcium Carbonate (CALCIUM 600) 600 MG Tab Take  by mouth.     • fluoxetine (PROZAC) 40 MG capsule Take 40 mg by mouth every day.     • alprazolam (XANAX) 0.5 MG Tab Take 0.5 mg by mouth at bedtime as needed for Sleep.     • mirtazapine (REMERON) 15 MG Tab Take 15 mg by mouth every evening.     • B Complex-C-E-Zn (STRESS B-COMPLEX/VIT C/ZINC PO) Take  by mouth.       No current facility-administered medications for this visit.        She  has a past medical history of Anxiety; Depression; and Panic attack.    She  has a past surgical history that includes tubal coagulation laparoscopic bilateral and ganglion excision.     Social History     Social History   • Marital status:      Spouse name: N/A   • Number of children: N/A   • Years of education: N/A     Social History Main Topics   • Smoking status: Former Smoker     Packs/day: 1.00     Years: 10.00     Quit date: 7/6/1976   • Smokeless tobacco: Never Used   • Alcohol use 0.0 oz/week      Comment: occasionally   • Drug use: No   • Sexual activity: No     Other Topics Concern   • Not on file     Social History Narrative   • No narrative on file       Family History   Problem Relation Age of Onset   • Alcohol/Drug Mother          ROS  Positive for hip joint pain. Denies fall.  No fever, no chest pain, no shortness of breath, no abdominal pain, no rashes    All other systems reviewed and are negative.        Objective:     /76 (BP Location: Left arm, Patient Position: Sitting, BP Cuff Size: Adult)    "Pulse 77   Temp 36.8 °C (98.2 °F) (Temporal)   Resp 14   Ht 1.753 m (5' 9\")   Wt 83 kg (183 lb)   SpO2 96%  Body mass index is 27.02 kg/m².    Physical Exam:   Constitutional: Alert, no distress.  Respiratory: Unlabored respiratory effort, lungs clear to auscultation, no wheezes, no ronchi.  Cardiovascular: Normal S1, S2, no murmur, no edema.   Psych: Alert and oriented x3, normal affect and mood.    HIP Exam:      Gait: Normal  Patient is not using any assistive device today.      ROM: Abduction:  45 deg                Adduction:  25 deg                Internal Rotation:  45 deg                External Rotation:  45 deg                Straight leg raise:   Normal bilaterally without pain   Neuro: Alert, Oriented X 3, cooperative, moving all extremities. Full/symmetrical motor strength. Normal/symmetrical DTR's. Normal proximal/distal sensory in extremities.  SKIN: No rashes, redness, heat or signs of infection noted at joint.            Assessment and Plan:   The following treatment plan was discussed    1. Pain of both hip joints  Discussed with patient x-ray results and conservative treatment.  Patient did have questions in regards to chiropractic therapy.  I discussed with patient recommendation for physical therapy.  She may also consult with orthopedic provider.  Discussed signs or symptoms to seek emergent care.  - REFERRAL TO PHYSICAL THERAPY Reason for Therapy: Eval/Treat/Report  - REFERRAL TO ORTHOPEDICS    2. Osteopenia of multiple sites    Reviewed risks and benefits of treatment plan. Patient verbally agrees to plan of care.       Followup: Return if symptoms worsen or fail to improve.    MO [email or web address].     PLEASE NOTE: This dictation was created using voice recognition software. I have made every reasonable attempt to correct obvious errors, but I expect that there may be errors of grammar and possibly content that I did not discover prior finalizing this note.          "

## 2019-06-06 NOTE — ASSESSMENT & PLAN NOTE
This is a new problem. Patient reports she has pain, more on the right, recently. She reports most of her pain is when she lays down at night to sleep and worse when she gets up in the morning. She reports pain decreases as the goes on. She is taking motrin which does help, once daily. She has been gardening more and she thinks this may be the cause of her pain. Denies any fall. She does do exercises daily and also walks daily. She also does stretching daily.     5/14/2019 9:12 AM    HISTORY/REASON FOR EXAM:  Bilateral hip pain.      TECHNIQUE/EXAM DESCRIPTION AND NUMBER OF VIEWS:  3 views of the bilateral hip.    COMPARISON: None    FINDINGS:  There is no evidence of acute fracture involving the pelvis. No proximal femoral fracture is identified.  There is no evidence of femoral head flattening. No femoral head deformity is identified. No bone erosions are appreciated.  There is mild hip joint space narrowing.   Impression       1.  No radiographic evidence of acute traumatic injury.    2.  Findings are consistent with mild osteoarthritis.

## 2019-07-11 ENCOUNTER — PATIENT MESSAGE (OUTPATIENT)
Dept: OBGYN | Facility: CLINIC | Age: 74
End: 2019-07-11

## 2019-07-16 NOTE — TELEPHONE ENCOUNTER
From: Gabriella Mckee  To: JOHNATHON Krishnamurthy  Sent: 7/11/2019 11:54 AM PDT  Subject: Procedure Question    I had this hip and sciatic pain since Jun 12 saw a ortho doc Mon said I should do therapy. I be been icing and ibuprofen and Tylenol     Waiting for appointment recall      What would I do in meantime

## 2020-03-28 ENCOUNTER — APPOINTMENT (OUTPATIENT)
Dept: RADIOLOGY | Facility: IMAGING CENTER | Age: 75
End: 2020-03-28
Attending: PHYSICIAN ASSISTANT
Payer: MEDICARE

## 2020-03-28 ENCOUNTER — OFFICE VISIT (OUTPATIENT)
Dept: URGENT CARE | Facility: PHYSICIAN GROUP | Age: 75
End: 2020-03-28
Payer: MEDICARE

## 2020-03-28 VITALS
WEIGHT: 184 LBS | HEART RATE: 58 BPM | OXYGEN SATURATION: 96 % | RESPIRATION RATE: 16 BRPM | BODY MASS INDEX: 27.17 KG/M2 | TEMPERATURE: 97.8 F | SYSTOLIC BLOOD PRESSURE: 152 MMHG | DIASTOLIC BLOOD PRESSURE: 80 MMHG

## 2020-03-28 DIAGNOSIS — R07.81 RIB PAIN ON LEFT SIDE: ICD-10-CM

## 2020-03-28 DIAGNOSIS — S20.212A RIB CONTUSION, LEFT, INITIAL ENCOUNTER: ICD-10-CM

## 2020-03-28 PROCEDURE — 99214 OFFICE O/P EST MOD 30 MIN: CPT | Performed by: PHYSICIAN ASSISTANT

## 2020-03-28 PROCEDURE — 71101 X-RAY EXAM UNILAT RIBS/CHEST: CPT | Mod: TC,FY,LT | Performed by: PHYSICIAN ASSISTANT

## 2020-03-28 ASSESSMENT — ENCOUNTER SYMPTOMS
FEVER: 0
FALLS: 1
NAUSEA: 0
BACK PAIN: 0
SHORTNESS OF BREATH: 0
PALPITATIONS: 0
LOSS OF CONSCIOUSNESS: 0
HEMOPTYSIS: 0
WHEEZING: 0
MYALGIAS: 1
CHILLS: 0
NECK PAIN: 0
COUGH: 0
VOMITING: 0

## 2020-03-28 ASSESSMENT — PAIN SCALES - GENERAL: PAINLEVEL: 5=MODERATE PAIN

## 2020-03-28 NOTE — PROGRESS NOTES
Subjective:   Gabriella Mckee is a 74 y.o. female who presents for Rib Injury (L side rib pain x3wks )        This is a new problem.  Patient complains of left-sided rib pain x3 weeks.  Symptoms began after she fell in her yard and directly impacted the area on an hard object.  She points to her inferior anterolateral ribs as the predominant site of pain.  Symptoms have slightly improved initial onset however patient is concerned by how long she has had symptoms.  Pain is worse with direct pressure and with laying on that time.  No significant increase in pain with breathing or coughing.  No dyspnea or shortness of breath.  Denies rashes, chest pain, abdominal pain, nausea, vomiting, fevers, chills. She is taking Advil twice a day with good symptomatic control.  However pain returns when she stopped this medication.  She has not tried ice or heat.  Denies prior injury to the area.  No other aggravating or alleviating factors.    Review of Systems   Constitutional: Negative for chills and fever.   Respiratory: Negative for cough, hemoptysis, shortness of breath and wheezing.    Cardiovascular: Negative for chest pain and palpitations.   Gastrointestinal: Negative for nausea and vomiting.   Musculoskeletal: Positive for falls and myalgias. Negative for back pain, joint pain and neck pain.   Skin: Negative for rash.   Neurological: Negative for loss of consciousness.       PMH:  has a past medical history of Anxiety, Arthritis, Depression, and Panic attack.  MEDS:   Current Outpatient Medications:   •  Calcium Carbonate (CALCIUM 600) 600 MG Tab, Take  by mouth., Disp: , Rfl:   •  fluoxetine (PROZAC) 40 MG capsule, Take 40 mg by mouth every day., Disp: , Rfl:   •  alprazolam (XANAX) 0.5 MG Tab, Take 0.5 mg by mouth at bedtime as needed for Sleep., Disp: , Rfl:   •  B Complex-C-E-Zn (STRESS B-COMPLEX/VIT C/ZINC PO), Take  by mouth., Disp: , Rfl:   •  naproxen (NAPROSYN) 500 MG Tab, , Disp: , Rfl: 0  •  mirtazapine  (REMERON) 15 MG Tab, Take 15 mg by mouth every evening., Disp: , Rfl:   ALLERGIES: No Known Allergies  SURGHX:   Past Surgical History:   Procedure Laterality Date   • GANGLION EXCISION      R wrist    • TUBAL COAGULATION LAPAROSCOPIC BILATERAL       SOCHX:  reports that she quit smoking about 43 years ago. She has a 10.00 pack-year smoking history. She has never used smokeless tobacco. She reports current alcohol use. She reports that she does not use drugs.  FH: Family history was reviewed, no pertinent findings to report   Objective:   /80   Pulse (!) 58   Temp 36.6 °C (97.8 °F) (Temporal)   Resp 16   Wt 83.5 kg (184 lb)   SpO2 96%   BMI 27.17 kg/m²   Physical Exam  Vitals signs reviewed.   Constitutional:       General: She is not in acute distress.     Appearance: Normal appearance. She is well-developed. She is not toxic-appearing.   HENT:      Head: Normocephalic and atraumatic.      Right Ear: External ear normal.      Left Ear: External ear normal.      Nose: Nose normal.   Eyes:      General: Lids are normal.      Conjunctiva/sclera: Conjunctivae normal.   Neck:      Musculoskeletal: Neck supple.   Cardiovascular:      Rate and Rhythm: Normal rate and regular rhythm.      Heart sounds: Normal heart sounds, S1 normal and S2 normal. No murmur. No friction rub. No gallop.    Pulmonary:      Effort: Pulmonary effort is normal. No respiratory distress.      Breath sounds: Normal breath sounds. No decreased breath sounds, wheezing, rhonchi or rales.   Chest:          Comments: Chest rises and falls evenly.  No flail chest.  No retractions or signs of increased work of breathing.  Musculoskeletal:      Comments: Spine palpated.  No tenderness to palpation, step-offs, deformities.   Skin:     General: Skin is warm and dry.      Capillary Refill: Capillary refill takes less than 2 seconds.   Neurological:      Mental Status: She is alert and oriented to person, place, and time.      Cranial Nerves: No  cranial nerve deficit.      Sensory: No sensory deficit.   Psychiatric:         Speech: Speech normal.         Behavior: Behavior normal.         Thought Content: Thought content normal.         Judgment: Judgment normal.           Assessment/Plan:   1. Rib contusion, left, initial encounter  - REFERRAL TO FOLLOW-UP WITH PRIMARY CARE    2. Rib pain on left side  - [id number] (WITH 1-VIEW CXR) LEFT; Future    X-rays obtained to further evaluate:   No acute fracture by my read. Possible densities or cystic lesion in ribs-8-10. Could also represent over or underlying structure.  FINDINGS:  No acute displaced rib fractures are identified. No pneumothorax.  Chronic appearing deformity of the left lateral third rib.  Mild bibasilar atelectasis. The lungs are otherwise clear.  Unremarkable cardiomediastinal silhouette. Atherosclerosis.     IMPRESSION:     1.  No acute rib fracture or pneumothorax.  2.  Chronic appearing deformity of the left lateral third rib.    Radiology read is negative.  Discussed my concerns with patient.  I recommend continuing conservative care for another 7 to 14 days.  Continue OTC NSAIDs.  I would also like patient to try heat.  I would like patient to follow-up with her PCP for reevaluation.  If symptoms persist repeat imaging or advanced imaging may be indicated.    Differential diagnosis, natural history, supportive care, and indications for immediate follow-up discussed.

## 2020-06-11 ENCOUNTER — APPOINTMENT (OUTPATIENT)
Dept: MEDICAL GROUP | Facility: MEDICAL CENTER | Age: 75
End: 2020-06-11
Payer: MEDICARE

## 2020-07-10 ENCOUNTER — OFFICE VISIT (OUTPATIENT)
Dept: URGENT CARE | Facility: PHYSICIAN GROUP | Age: 75
End: 2020-07-10
Payer: MEDICARE

## 2020-07-10 VITALS
TEMPERATURE: 98.8 F | SYSTOLIC BLOOD PRESSURE: 146 MMHG | WEIGHT: 185 LBS | OXYGEN SATURATION: 96 % | HEIGHT: 69 IN | BODY MASS INDEX: 27.4 KG/M2 | DIASTOLIC BLOOD PRESSURE: 78 MMHG | RESPIRATION RATE: 16 BRPM | HEART RATE: 65 BPM

## 2020-07-10 DIAGNOSIS — Z00.00 HEALTH CARE MAINTENANCE: ICD-10-CM

## 2020-07-10 DIAGNOSIS — M79.89 SWELLING OF CALF: ICD-10-CM

## 2020-07-10 PROCEDURE — 99213 OFFICE O/P EST LOW 20 MIN: CPT | Performed by: NURSE PRACTITIONER

## 2020-07-10 ASSESSMENT — ENCOUNTER SYMPTOMS
VOMITING: 0
ABDOMINAL PAIN: 0
CHILLS: 0
SHORTNESS OF BREATH: 0
MYALGIAS: 0
HEADACHES: 0
FEVER: 0
DIZZINESS: 0
COUGH: 0
LEG PAIN: 1
NAUSEA: 0
WHEEZING: 0
FALLS: 0

## 2020-07-10 NOTE — PROGRESS NOTES
"Subjective:     Gabriella Mckee is a 74 y.o. female who presents for Leg Pain (bilat calf pain and swelling x2 days )      Leg Pain   Pertinent negatives include no abdominal pain, chills, coughing, fever, headaches, myalgias, nausea or vomiting.     Pt presents for evaluation of a new problem. She complains of bilateral calf \"hardening and swelling\" following a massage yesterday. She denies any pain to her calves. Her problem remains unchanged. She has used nothing for her symptoms. She denies pain with walking, erythema, fever chills, nausea or vomiting. She is an active person and does not use estrogen. She does state her legs have been cramping intermittently for the past few years worsening in the past months.     Review of Systems   Constitutional: Negative for chills, fever and malaise/fatigue.   Respiratory: Negative for cough, shortness of breath and wheezing.    Gastrointestinal: Negative for abdominal pain, nausea and vomiting.   Musculoskeletal: Negative for falls and myalgias.   Neurological: Negative for dizziness and headaches.       PMH:   Past Medical History:   Diagnosis Date   • Anxiety    • Arthritis    • Depression    • Panic attack      ALLERGIES: No Known Allergies  SURGHX:   Past Surgical History:   Procedure Laterality Date   • GANGLION EXCISION      R wrist    • TUBAL COAGULATION LAPAROSCOPIC BILATERAL       SOCHX:   Social History     Socioeconomic History   • Marital status:      Spouse name: Not on file   • Number of children: Not on file   • Years of education: Not on file   • Highest education level: Not on file   Occupational History   • Not on file   Social Needs   • Financial resource strain: Not on file   • Food insecurity     Worry: Not on file     Inability: Not on file   • Transportation needs     Medical: Not on file     Non-medical: Not on file   Tobacco Use   • Smoking status: Former Smoker     Packs/day: 1.00     Years: 10.00     Pack years: 10.00     Last attempt to " "quit: 1976     Years since quittin.0   • Smokeless tobacco: Never Used   Substance and Sexual Activity   • Alcohol use: Yes     Alcohol/week: 0.0 oz     Comment: occasionally   • Drug use: No   • Sexual activity: Never   Lifestyle   • Physical activity     Days per week: Not on file     Minutes per session: Not on file   • Stress: Not on file   Relationships   • Social connections     Talks on phone: Not on file     Gets together: Not on file     Attends Jewish service: Not on file     Active member of club or organization: Not on file     Attends meetings of clubs or organizations: Not on file     Relationship status: Not on file   • Intimate partner violence     Fear of current or ex partner: Not on file     Emotionally abused: Not on file     Physically abused: Not on file     Forced sexual activity: Not on file   Other Topics Concern   • Not on file   Social History Narrative   • Not on file     FH:   Family History   Problem Relation Age of Onset   • Alcohol/Drug Mother          Objective:   /78   Pulse 65   Temp 37.1 °C (98.8 °F)   Resp 16   Ht 1.753 m (5' 9\")   Wt 83.9 kg (185 lb)   SpO2 96%   BMI 27.32 kg/m²     Physical Exam  Vitals signs and nursing note reviewed.   Constitutional:       Appearance: Normal appearance.   HENT:      Head: Normocephalic and atraumatic.      Right Ear: External ear normal.      Nose: No congestion or rhinorrhea.      Mouth/Throat:      Mouth: Mucous membranes are moist.   Eyes:      Extraocular Movements: Extraocular movements intact.      Pupils: Pupils are equal, round, and reactive to light.   Neck:      Musculoskeletal: Normal range of motion and neck supple.   Cardiovascular:      Rate and Rhythm: Normal rate and regular rhythm.      Heart sounds: Normal heart sounds.   Pulmonary:      Effort: Pulmonary effort is normal.      Breath sounds: Normal breath sounds.   Abdominal:      General: Abdomen is flat. There is no distension.   Musculoskeletal: "      Right lower leg: She exhibits swelling. She exhibits no tenderness and no bony tenderness. No edema.      Left lower leg: She exhibits swelling. She exhibits no tenderness and no bony tenderness.      Comments: Negative Homans sign, negative for erythema or warmth. Negative for pain to palpation. Bilateral pedal and popliteal pulses palpable   Skin:     General: Skin is warm and dry.      Findings: No rash.   Neurological:      General: No focal deficit present.      Mental Status: She is alert and oriented to person, place, and time. Mental status is at baseline.   Psychiatric:         Mood and Affect: Mood normal.         Behavior: Behavior normal.         Thought Content: Thought content normal.         Judgment: Judgment normal.         Assessment/Plan:   Assessment    1. Swelling of calf  US-EXTREMITY VENOUS LOWER BILAT     We discussed differential diagnoses. U/S ordered and scheduled for today to rule out acute DVT. I will call her with results.   AVS handout given and reviewed with patient. Pt educated on red flags and when to seek treatment back in ER or UC.

## 2021-05-25 NOTE — ASSESSMENT & PLAN NOTE
Last A1c August 2017, this was stable at the time at 6.2.  She denies any polyuria, polydipsia.  She is due for recheck.   MAXIMO

## 2021-11-01 ENCOUNTER — HOSPITAL ENCOUNTER (OUTPATIENT)
Facility: MEDICAL CENTER | Age: 76
End: 2021-11-01
Attending: PHYSICIAN ASSISTANT
Payer: MEDICARE

## 2021-11-01 ENCOUNTER — OFFICE VISIT (OUTPATIENT)
Dept: MEDICAL GROUP | Facility: CLINIC | Age: 76
End: 2021-11-01
Payer: MEDICARE

## 2021-11-01 VITALS
TEMPERATURE: 97.3 F | WEIGHT: 181.4 LBS | BODY MASS INDEX: 26.87 KG/M2 | SYSTOLIC BLOOD PRESSURE: 128 MMHG | RESPIRATION RATE: 16 BRPM | HEART RATE: 62 BPM | DIASTOLIC BLOOD PRESSURE: 66 MMHG | HEIGHT: 69 IN | OXYGEN SATURATION: 97 %

## 2021-11-01 DIAGNOSIS — Z11.59 NEED FOR HEPATITIS C SCREENING TEST: ICD-10-CM

## 2021-11-01 DIAGNOSIS — M85.89 OSTEOPENIA OF MULTIPLE SITES: ICD-10-CM

## 2021-11-01 DIAGNOSIS — Z00.00 MEDICARE ANNUAL WELLNESS VISIT, SUBSEQUENT: Primary | ICD-10-CM

## 2021-11-01 DIAGNOSIS — Z12.11 COLON CANCER SCREENING: ICD-10-CM

## 2021-11-01 DIAGNOSIS — M48.061 SPINAL STENOSIS OF LUMBAR REGION, UNSPECIFIED WHETHER NEUROGENIC CLAUDICATION PRESENT: ICD-10-CM

## 2021-11-01 DIAGNOSIS — F41.0 PANIC ATTACKS: ICD-10-CM

## 2021-11-01 DIAGNOSIS — Z13.220 LIPID SCREENING: ICD-10-CM

## 2021-11-01 DIAGNOSIS — E78.00 ELEVATED LDL CHOLESTEROL LEVEL: ICD-10-CM

## 2021-11-01 DIAGNOSIS — Z23 NEED FOR VACCINATION: ICD-10-CM

## 2021-11-01 DIAGNOSIS — R73.03 PREDIABETES: ICD-10-CM

## 2021-11-01 DIAGNOSIS — Z00.00 MEDICARE ANNUAL WELLNESS VISIT, SUBSEQUENT: ICD-10-CM

## 2021-11-01 DIAGNOSIS — Z87.891 FORMER SMOKER: ICD-10-CM

## 2021-11-01 DIAGNOSIS — Z12.31 VISIT FOR SCREENING MAMMOGRAM: ICD-10-CM

## 2021-11-01 DIAGNOSIS — F33.1 MODERATE EPISODE OF RECURRENT MAJOR DEPRESSIVE DISORDER (HCC): ICD-10-CM

## 2021-11-01 PROCEDURE — 83036 HEMOGLOBIN GLYCOSYLATED A1C: CPT | Mod: GA

## 2021-11-01 PROCEDURE — G0008 ADMIN INFLUENZA VIRUS VAC: HCPCS | Performed by: PHYSICIAN ASSISTANT

## 2021-11-01 PROCEDURE — 80061 LIPID PANEL: CPT

## 2021-11-01 PROCEDURE — 80053 COMPREHEN METABOLIC PANEL: CPT

## 2021-11-01 PROCEDURE — 86803 HEPATITIS C AB TEST: CPT

## 2021-11-01 PROCEDURE — 99214 OFFICE O/P EST MOD 30 MIN: CPT | Performed by: PHYSICIAN ASSISTANT

## 2021-11-01 PROCEDURE — 90662 IIV NO PRSV INCREASED AG IM: CPT | Performed by: PHYSICIAN ASSISTANT

## 2021-11-01 RX ORDER — ZOSTER VACCINE RECOMBINANT, ADJUVANTED 50 MCG/0.5
0.5 KIT INTRAMUSCULAR ONCE
Qty: 1 EACH | Refills: 0 | Status: SHIPPED | OUTPATIENT
Start: 2021-11-01 | End: 2021-11-01 | Stop reason: SDUPTHER

## 2021-11-01 RX ORDER — ZOSTER VACCINE RECOMBINANT, ADJUVANTED 50 MCG/0.5
0.5 KIT INTRAMUSCULAR ONCE
Qty: 1 EACH | Refills: 0 | Status: SHIPPED | OUTPATIENT
Start: 2021-11-01 | End: 2021-11-01

## 2021-11-01 ASSESSMENT — ENCOUNTER SYMPTOMS: GENERAL WELL-BEING: GOOD

## 2021-11-01 ASSESSMENT — PATIENT HEALTH QUESTIONNAIRE - PHQ9
5. POOR APPETITE OR OVEREATING: 1 - SEVERAL DAYS
CLINICAL INTERPRETATION OF PHQ2 SCORE: 1

## 2021-11-01 ASSESSMENT — ACTIVITIES OF DAILY LIVING (ADL): BATHING_REQUIRES_ASSISTANCE: 0

## 2021-11-01 NOTE — PROGRESS NOTES
Chief Complaint   Patient presents with   • Annual Wellness Visit       HPI:  Gabriella Mckee is a 76 y.o. here for Medicare Annual Wellness Visit     Patient Active Problem List    Diagnosis Date Noted   • Pain of both hip joints 2019   • Moderate episode of recurrent major depressive disorder (HCC) 2019   • Stress incontinence 2019   • Osteopenia of multiple sites 2019   • Panic attacks 2016   • Insomnia 2016   • Prediabetes 2016       Current Outpatient Medications   Medication Sig Dispense Refill   • Calcium Carbonate (CALCIUM 600) 600 MG Tab Take  by mouth.     • fluoxetine (PROZAC) 40 MG capsule Take 40 mg by mouth every day.     • alprazolam (XANAX) 0.5 MG Tab Take 0.5 mg by mouth at bedtime as needed for Sleep.     • mirtazapine (REMERON) 15 MG Tab Take 15 mg by mouth every evening.     • B Complex-C-E-Zn (STRESS B-COMPLEX/VIT C/ZINC PO) Take  by mouth.     • naproxen (NAPROSYN) 500 MG Tab  (Patient not taking: Reported on 2021)  0     No current facility-administered medications for this visit.          Current supplements as per medication list.     Allergies: Patient has no known allergies.    Current social contact/activities: Sees friend, walking     She  reports that she quit smoking about 45 years ago. She has a 10.00 pack-year smoking history. She has never used smokeless tobacco. She reports current alcohol use. She reports that she does not use drugs.  Counseling given: Not Answered      DPA/Advanced Directive:  Patient has Durable Power of , but it is not on file. Instructed to bring in a copy to scan into their chart.    ROS:    Gait: Uses no assistive device  Ostomy: No  Other tubes: No  Amputations: No  Chronic oxygen use: No  Last eye exam: 10/2021  Wears hearing aids: No   : Reports urinary leakage during the last 6 months that has not interfered at all with their daily activities or sleep.    Screenin  Depression  Screening    Little interest or pleasure in doing things?some     Feeling down, depressed , or hopeless?some    Trouble falling or staying asleep, or sleeping too much? some    Feeling tired or having little energy?no     Poor appetite or overeating?overeating     Feeling bad about yourself - or that you are a failure or have let yourself or your family down? Some     Trouble concentrating on things, such as reading the newspaper or watching television?no     Moving or speaking so slowly that other people could have noticed.  Or the opposite - being so fidgety or restless that you have been moving around a lot more than usual?no      Thoughts that you would be better off dead, or of hurting yourself? No      Patient Health Questionnaire Score:      If depressive symptoms identified deferred to follow up visit unless specifically addressed in assessment and plan.    Interpretation of PHQ-9 Total Score   Score Severity   1-4 No Depression   5-9 Mild Depression   10-14 Moderate Depression   15-19 Moderately Severe Depression   20-27 Severe Depression      Screening for Cognitive Impairment    Three Minute Recall (captain, garden, picture)  2/3    Regan clock face with all 12 numbers and set the hands to show 5 past 8.       Cognitive concerns identified deferred for follow up unless specifically addressed in assessment and plan.    Fall Risk Assessment    Has the patient had two or more falls in the last year or any fall with injury in the last year? yes      Safety Assessment    Throw rugs on floor. yes    Handrails on all stairs.no     Good lighting in all hallways.yes     Difficulty hearing. yes    Patient counseled about all safety risks that were identified.    Functional Assessment ADLs    Are there any barriers preventing you from cooking for yourself or meeting nutritional needs? no  .    Are there any barriers preventing you from driving safely or obtaining transportation? no   .    Are there any barriers  preventing you from using a telephone or calling for help? no   .    Are there any barriers preventing you from shopping?no    .    Are there any barriers preventing you from taking care of your own finances? no  .    Are there any barriers preventing you from managing your medications? no  .    Are there any barriers preventing you from showering, bathing or dressing yourself? no  .    Are you currently engaging in any exercise or physical activity? Some    .     What is your perception of your health? good   .    Health Maintenance Summary                HEPATITIS C SCREENING Overdue 1945     IMM ZOSTER VACCINES Overdue 2016      Done 2016 Imm Admin: Zoster Vaccine Live (ZVL) (Zostavax) - HISTORICAL DATA    Annual Wellness Visit Overdue 2020      Done 2019 SUBSEQUENT ANNUAL WELLNESS VISIT-INCLUDES PPPS ()     Patient has more history with this topic...    MAMMOGRAM Overdue 2020      Done 2019 MA-SCREENING MAMMO BILAT W/TOMOSYNTHESIS W/CAD     Patient has more history with this topic...    IMM INFLUENZA Overdue 2021      Done 10/22/2013 Imm Admin: Influenza Vaccine Adult HD    COLORECTAL CANCER SCREENING Overdue 2021     BONE DENSITY Next Due 2024      Done 2019 DS-BONE DENSITY STUDY (DEXA)     Patient has more history with this topic...    IMM DTaP/Tdap/Td Vaccine Next Due 2027      Done 2017 Imm Admin: Tdap Vaccine          Patient Care Team:  Beth Campbell P.A.-C. as PCP - General (Physician Assistant)      Social History     Tobacco Use   • Smoking status: Former Smoker     Packs/day: 1.00     Years: 10.00     Pack years: 10.00     Quit date: 1976     Years since quittin.3   • Smokeless tobacco: Never Used   Vaping Use   • Vaping Use: Never used   Substance Use Topics   • Alcohol use: Yes     Alcohol/week: 0.0 oz     Comment: occasionally   • Drug use: No     Family History   Problem Relation Age of Onset   • Alcohol/Drug Mother      She   "has a past medical history of Anxiety, Arthritis, Depression, and Panic attack.   Past Surgical History:   Procedure Laterality Date   • GANGLION EXCISION      R wrist    • TUBAL COAGULATION LAPAROSCOPIC BILATERAL         Exam:   /66 (BP Location: Left arm, Patient Position: Sitting, BP Cuff Size: Adult)   Pulse 62   Temp 36.3 °C (97.3 °F) (Temporal)   Resp 16   Ht 1.753 m (5' 9\")   Wt 82.3 kg (181 lb 6.4 oz)   SpO2 97%  Body mass index is 26.79 kg/m².    Hearing good.    Dentition none  Alert, oriented in no acute distress.  Eye contact is good, speech goal directed, affect calm    Assessment and Plan. The following treatment and monitoring plan is recommended:      Gabriella was seen today for annual wellness visit.    Diagnoses and all orders for this visit:    Medicare annual wellness visit, subsequent  Comments:  Screening testing ordered.  Labs and IZ's ordered.    Former smoker  Comments:  Screening testing ordered.    Orders:  -     Referral to Tobacco Cessation Program    Panic attacks  Comments:  Patient sees psychiatry    Moderate episode of recurrent major depressive disorder (HCC)  Comments:  Patient sees psychiatry    Prediabetes  Comments:  Labs have been ordered to evaluate further  Orders:  -     Comp Metabolic Panel; Future  -     HEMOGLOBIN A1C; Future    Elevated LDL cholesterol level  Comments:  Labs have been ordered to evaluate further  Orders:  -     Lipid Profile; Future    Lipid screening  Comments:  Labs have been ordered to evaluate further.      Osteopenia of multiple sites  Comments:  Bone density ordered.    Orders:  -     DS-BONE DENSITY STUDY (DEXA); Future  -     VITAMIN D,25 HYDROXY; Future    Spinal stenosis of lumbar region, unspecified whether neurogenic claudication present  Comments:  Will obtain records from [facility].     Colon cancer screening  Comments:  Screening testing ordered.    Orders:  -     Referral to GI for Colonoscopy    Visit for screening " mammogram  Comments:  Screening testing ordered.    Orders:  -     MA-SCREENING MAMMO BILAT W/TOMOSYNTHESIS W/CAD; Future    Need for vaccination  Comments:  High dose flu vaccine to be given today.  Shingrix Rx printed out.  Orders:  -     Discontinue: Zoster Vac Recomb Adjuvanted (SHINGRIX) 50 MCG/0.5ML Recon Susp; Inject 0.5 mL into the shoulder, thigh, or buttocks one time for 1 dose.  -     Influenza Vaccine, High Dose (65+ Only)  -     Zoster Vac Recomb Adjuvanted (SHINGRIX) 50 MCG/0.5ML Recon Susp; Inject 0.5 mL into the shoulder, thigh, or buttocks one time for 1 dose.    Need for hepatitis C screening test  Comments:  Screening testing ordered.    Orders:  -     HEP C VIRUS ANTIBODY; Future            No follow-ups on file.    Please note that this dictation was created using voice recognition software. I have made every reasonable attempt to correct obvious errors, but expect that there are errors of grammar and possible content that I did not discover before finalizing note.     Services suggested: No services needed at this time  Health Care Screening: Age-appropriate preventive services recommended by USPTF and ACIP covered by Medicare were discussed today. Services ordered if indicated and agreed upon by the patient.  Referrals offered: Community-based lifestyle interventions to reduce health risks and promote self-management and wellness, fall prevention, nutrition, physical activity, tobacco-use cessation, weight loss, and mental health services as per orders if indicated.    Discussion today about general wellness and lifestyle habits:    · Prevent falls and reduce trip hazards; Cautioned about securing or removing rugs.  · Have a working fire alarm and carbon monoxide detector;   · Engage in regular physical activity and social activities     Follow-up: No follow-ups on file.

## 2021-11-01 NOTE — LETTER
[employer]  Beth Campbell P.A.-C.  [address]  [facility] [address]  Fax: [phone]   Authorization for Release/Disclosure of   Protected Health Information   Name: GABRIELLA MCKEE : 1945 SSN: [id number]   Address: [address] [place] [address] Phone:    [phone] (home)    I authorize the entity listed below to release/disclose the PHI below to:   [employer]/Beth Campbell P.A.-C. and Beth Campbell P.A.-C.   Provider or Entity Name:  [facility]     Address   City, State, Zip   Phone:      Fax:     Reason for request: continuity of care   Information to be released:    [  ] LAST COLONOSCOPY,  including any PATH REPORT and follow-up  [  ] LAST FIT/COLOGUARD RESULT [  ] LAST DEXA  [  ] LAST MAMMOGRAM  [  ] LAST PAP  [  ] LAST LABS [  ] RETINA EXAM REPORT  [  ] IMMUNIZATION RECORDS  [ X ] Release all info      [  ] Check here and initial the line next to each item to release ALL health information INCLUDING  _____ Care and treatment for drug and / or alcohol abuse  _____ HIV testing, infection status, or AIDS  _____ Genetic Testing    DATES OF SERVICE OR TIME PERIOD TO BE DISCLOSED: _____________  I understand and acknowledge that:  * This Authorization may be revoked at any time by you in writing, except if your health information has already been used or disclosed.  * Your health information that will be used or disclosed as a result of you signing this authorization could be re-disclosed by the recipient. If this occurs, your re-disclosed health information may no longer be protected by State or Federal laws.  * You may refuse to sign this Authorization. Your refusal will not affect your ability to obtain treatment.  * This Authorization becomes effective upon signing and will  on (date) __________.      If no date is indicated, this Authorization will  one (1) year from the signature date.    Name: Gabriella Mckee    Signature:   Date:      11/1/2021       PLEASE FAX REQUESTED RECORDS BACK TO: [phone]

## 2021-11-01 NOTE — PROGRESS NOTES
Chief Complaint   Patient presents with   • Annual Wellness Visit       HPI:  Gabriella Mckee is a 76 y.o. here for Medicare Annual Wellness Visit     Patient Active Problem List    Diagnosis Date Noted   • Pain of both hip joints 2019   • Moderate episode of recurrent major depressive disorder (HCC) 2019   • Stress incontinence 2019   • Osteopenia of multiple sites 2019   • Panic attacks 2016   • Insomnia 2016   • Prediabetes 2016       Current Outpatient Medications   Medication Sig Dispense Refill   • Calcium Carbonate (CALCIUM 600) 600 MG Tab Take  by mouth.     • fluoxetine (PROZAC) 40 MG capsule Take 40 mg by mouth every day.     • alprazolam (XANAX) 0.5 MG Tab Take 0.5 mg by mouth at bedtime as needed for Sleep.     • mirtazapine (REMERON) 15 MG Tab Take 15 mg by mouth every evening.     • B Complex-C-E-Zn (STRESS B-COMPLEX/VIT C/ZINC PO) Take  by mouth.     • naproxen (NAPROSYN) 500 MG Tab  (Patient not taking: Reported on 2021)  0     No current facility-administered medications for this visit.          Current supplements as per medication list.     Allergies: Patient has no known allergies.    Current social contact/activities: Sees friend, walking     She  reports that she quit smoking about 45 years ago. She has a 10.00 pack-year smoking history. She has never used smokeless tobacco. She reports current alcohol use. She reports that she does not use drugs.  Counseling given: Not Answered      DPA/Advanced Directive:  Patient has Durable Power of , but it is not on file. Instructed to bring in a copy to scan into their chart.    ROS:    Gait: Uses no assistive device  Ostomy: No  Other tubes: No  Amputations: No  Chronic oxygen use: No  Last eye exam: 10/2021  Wears hearing aids: No   : Reports urinary leakage during the last 6 months that has not interfered at all with their daily activities or sleep.    Screenin  Depression  Screening    Little interest or pleasure in doing things?some     Feeling down, depressed , or hopeless?some    Trouble falling or staying asleep, or sleeping too much? some    Feeling tired or having little energy?no     Poor appetite or overeating?overeating     Feeling bad about yourself - or that you are a failure or have let yourself or your family down? Some     Trouble concentrating on things, such as reading the newspaper or watching television?no     Moving or speaking so slowly that other people could have noticed.  Or the opposite - being so fidgety or restless that you have been moving around a lot more than usual?no      Thoughts that you would be better off dead, or of hurting yourself? No      Patient Health Questionnaire Score:      If depressive symptoms identified deferred to follow up visit unless specifically addressed in assessment and plan.    Interpretation of PHQ-9 Total Score   Score Severity   1-4 No Depression   5-9 Mild Depression   10-14 Moderate Depression   15-19 Moderately Severe Depression   20-27 Severe Depression      Screening for Cognitive Impairment    Three Minute Recall (captain, garden, picture)  2/3    Regan clock face with all 12 numbers and set the hands to show 5 past 8.       Cognitive concerns identified deferred for follow up unless specifically addressed in assessment and plan.    Fall Risk Assessment    Has the patient had two or more falls in the last year or any fall with injury in the last year? yes      Safety Assessment    Throw rugs on floor. yes    Handrails on all stairs.no     Good lighting in all hallways.yes     Difficulty hearing. yes    Patient counseled about all safety risks that were identified.    Functional Assessment ADLs    Are there any barriers preventing you from cooking for yourself or meeting nutritional needs? no  .    Are there any barriers preventing you from driving safely or obtaining transportation? no   .    Are there any barriers  preventing you from using a telephone or calling for help? no   .    Are there any barriers preventing you from shopping?no    .    Are there any barriers preventing you from taking care of your own finances? no  .    Are there any barriers preventing you from managing your medications? no  .    Are there any barriers preventing you from showering, bathing or dressing yourself? no  .    Are you currently engaging in any exercise or physical activity? Some    .     What is your perception of your health? good   .    Health Maintenance Summary                HEPATITIS C SCREENING Overdue 1946     IMM ZOSTER VACCINES Overdue 2016      Done 2016 Imm Admin: Zoster Vaccine Live (ZVL) (Zostavax) - HISTORICAL DATA    Annual Wellness Visit Overdue 2020      Done 2019 SUBSEQUENT ANNUAL WELLNESS VISIT-INCLUDES PPPS ()     Patient has more history with this topic...    MAMMOGRAM Overdue 2020      Done 2019 MA-SCREENING MAMMO BILAT W/TOMOSYNTHESIS W/CAD     Patient has more history with this topic...    IMM INFLUENZA Overdue 2021      Done 10/22/2013 Imm Admin: Influenza Vaccine Adult HD    COLORECTAL CANCER SCREENING Overdue 2021     BONE DENSITY Next Due 2024      Done 2019 DS-BONE DENSITY STUDY (DEXA)     Patient has more history with this topic...    IMM DTaP/Tdap/Td Vaccine Next Due 2027      Done 2017 Imm Admin: Tdap Vaccine          Patient Care Team:  Beth Campbell P.A.-C. as PCP - General (Physician Assistant)      Social History     Tobacco Use   • Smoking status: Former Smoker     Packs/day: 1.00     Years: 10.00     Pack years: 10.00     Quit date: 1976     Years since quittin.3   • Smokeless tobacco: Never Used   Vaping Use   • Vaping Use: Never used   Substance Use Topics   • Alcohol use: Yes     Alcohol/week: 0.0 oz     Comment: occasionally   • Drug use: No     Family History   Problem Relation Age of Onset   • Alcohol/Drug Mother      She   "has a past medical history of Anxiety, Arthritis, Depression, and Panic attack.   Past Surgical History:   Procedure Laterality Date   • GANGLION EXCISION      R wrist    • TUBAL COAGULATION LAPAROSCOPIC BILATERAL         Exam:   /66 (BP Location: Left arm, Patient Position: Sitting, BP Cuff Size: Adult)   Pulse 62   Temp 36.3 °C (97.3 °F) (Temporal)   Resp 16   Ht 1.753 m (5' 9\")   Wt 82.3 kg (181 lb 6.4 oz)   SpO2 97%  Body mass index is 26.79 kg/m².    Hearing good.    Dentition none  Alert, oriented in no acute distress.  Eye contact is good, speech goal directed, affect calm    Assessment and Plan. The following treatment and monitoring plan is recommended:      Gabriella was seen today for annual wellness visit.    Diagnoses and all orders for this visit:    Medicare annual wellness visit, subsequent  Comments:  Screening testing ordered.  Labs and IZ's ordered.    Former smoker  Comments:  Screening testing ordered.    Orders:  -     Referral to Tobacco Cessation Program    Panic attacks  Comments:  Patient sees psychiatry    Moderate episode of recurrent major depressive disorder (HCC)  Comments:  Patient sees psychiatry    Prediabetes  Comments:  Labs have been ordered to evaluate further  Orders:  -     Comp Metabolic Panel; Future  -     HEMOGLOBIN A1C; Future    Elevated LDL cholesterol level  Comments:  Labs have been ordered to evaluate further  Orders:  -     Lipid Profile; Future    Lipid screening  Comments:  Labs have been ordered to evaluate further.      Osteopenia of multiple sites  Comments:  Bone density ordered.    Orders:  -     DS-BONE DENSITY STUDY (DEXA); Future  -     VITAMIN D,25 HYDROXY; Future    Spinal stenosis of lumbar region, unspecified whether neurogenic claudication present  Comments:  Will obtain records from [facility].     Colon cancer screening  Comments:  Screening testing ordered.    Orders:  -     Referral to GI for Colonoscopy    Visit for screening " mammogram  Comments:  Screening testing ordered.    Orders:  -     MA-SCREENING MAMMO BILAT W/TOMOSYNTHESIS W/CAD; Future    Need for vaccination  Comments:  High dose flu vaccine to be given today.  Shingrix Rx printed out.  Orders:  -     Discontinue: Zoster Vac Recomb Adjuvanted (SHINGRIX) 50 MCG/0.5ML Recon Susp; Inject 0.5 mL into the shoulder, thigh, or buttocks one time for 1 dose.  -     Influenza Vaccine, High Dose (65+ Only)  -     Zoster Vac Recomb Adjuvanted (SHINGRIX) 50 MCG/0.5ML Recon Susp; Inject 0.5 mL into the shoulder, thigh, or buttocks one time for 1 dose.    Need for hepatitis C screening test  Comments:  Screening testing ordered.    Orders:  -     HEP C VIRUS ANTIBODY; Future            No follow-ups on file.    Please note that this dictation was created using voice recognition software. I have made every reasonable attempt to correct obvious errors, but expect that there are errors of grammar and possible content that I did not discover before finalizing note.     Services suggested: No services needed at this time  Health Care Screening: Age-appropriate preventive services recommended by USPTF and ACIP covered by Medicare were discussed today. Services ordered if indicated and agreed upon by the patient.  Referrals offered: Community-based lifestyle interventions to reduce health risks and promote self-management and wellness, fall prevention, nutrition, physical activity, tobacco-use cessation, weight loss, and mental health services as per orders if indicated.    Discussion today about general wellness and lifestyle habits:    · Prevent falls and reduce trip hazards; Cautioned about securing or removing rugs.  · Have a working fire alarm and carbon monoxide detector;   · Engage in regular physical activity and social activities     Follow-up: No follow-ups on file.

## 2021-11-02 LAB
ALBUMIN SERPL BCP-MCNC: 4.3 G/DL (ref 3.2–4.9)
ALBUMIN/GLOB SERPL: 1.6 G/DL
ALP SERPL-CCNC: 82 U/L (ref 30–99)
ALT SERPL-CCNC: 43 U/L (ref 2–50)
ANION GAP SERPL CALC-SCNC: 12 MMOL/L (ref 7–16)
AST SERPL-CCNC: 32 U/L (ref 12–45)
BILIRUB SERPL-MCNC: 0.3 MG/DL (ref 0.1–1.5)
BUN SERPL-MCNC: 15 MG/DL (ref 8–22)
CALCIUM SERPL-MCNC: 9.1 MG/DL (ref 8.5–10.5)
CHLORIDE SERPL-SCNC: 105 MMOL/L (ref 96–112)
CHOLEST SERPL-MCNC: 173 MG/DL (ref 100–199)
CO2 SERPL-SCNC: 23 MMOL/L (ref 20–33)
CREAT SERPL-MCNC: 0.69 MG/DL (ref 0.5–1.4)
EST. AVERAGE GLUCOSE BLD GHB EST-MCNC: 160 MG/DL
GLOBULIN SER CALC-MCNC: 2.7 G/DL (ref 1.9–3.5)
GLUCOSE SERPL-MCNC: 144 MG/DL (ref 65–99)
HBA1C MFR BLD: 7.2 % (ref 4–5.6)
HCV AB SER QL: NORMAL
HDLC SERPL-MCNC: 43 MG/DL
LDLC SERPL CALC-MCNC: 108 MG/DL
POTASSIUM SERPL-SCNC: 4.2 MMOL/L (ref 3.6–5.5)
PROT SERPL-MCNC: 7 G/DL (ref 6–8.2)
SODIUM SERPL-SCNC: 140 MMOL/L (ref 135–145)
TRIGL SERPL-MCNC: 111 MG/DL (ref 0–149)

## 2021-11-05 ENCOUNTER — TELEPHONE (OUTPATIENT)
Dept: MEDICAL GROUP | Facility: CLINIC | Age: 76
End: 2021-11-05

## 2021-11-09 ENCOUNTER — PATIENT MESSAGE (OUTPATIENT)
Dept: HEALTH INFORMATION MANAGEMENT | Facility: OTHER | Age: 76
End: 2021-11-09

## 2021-11-16 ENCOUNTER — OFFICE VISIT (OUTPATIENT)
Dept: MEDICAL GROUP | Facility: CLINIC | Age: 76
End: 2021-11-16
Payer: MEDICARE

## 2021-11-16 VITALS
SYSTOLIC BLOOD PRESSURE: 118 MMHG | RESPIRATION RATE: 16 BRPM | DIASTOLIC BLOOD PRESSURE: 60 MMHG | HEART RATE: 66 BPM | TEMPERATURE: 97.1 F | BODY MASS INDEX: 26.81 KG/M2 | OXYGEN SATURATION: 96 % | WEIGHT: 181 LBS | HEIGHT: 69 IN

## 2021-11-16 DIAGNOSIS — E11.65 UNCONTROLLED TYPE 2 DIABETES MELLITUS WITH HYPERGLYCEMIA (HCC): ICD-10-CM

## 2021-11-16 DIAGNOSIS — M48.061 SPINAL STENOSIS OF LUMBAR REGION, UNSPECIFIED WHETHER NEUROGENIC CLAUDICATION PRESENT: ICD-10-CM

## 2021-11-16 DIAGNOSIS — M54.32 SCIATICA OF LEFT SIDE: ICD-10-CM

## 2021-11-16 DIAGNOSIS — F33.1 MODERATE EPISODE OF RECURRENT MAJOR DEPRESSIVE DISORDER (HCC): ICD-10-CM

## 2021-11-16 DIAGNOSIS — E78.00 ELEVATED LDL CHOLESTEROL LEVEL: ICD-10-CM

## 2021-11-16 PROCEDURE — 99214 OFFICE O/P EST MOD 30 MIN: CPT | Performed by: PHYSICIAN ASSISTANT

## 2021-11-16 RX ORDER — GLUCOSAMINE HCL/CHONDROITIN SU 500-400 MG
CAPSULE ORAL
Qty: 100 EACH | Refills: 0 | Status: SHIPPED | OUTPATIENT
Start: 2021-11-16 | End: 2021-11-16 | Stop reason: SDUPTHER

## 2021-11-16 RX ORDER — LANCETS 30 GAUGE
EACH MISCELLANEOUS
Qty: 100 EACH | Refills: 0 | Status: SHIPPED | OUTPATIENT
Start: 2021-11-16 | End: 2023-12-17

## 2021-11-16 RX ORDER — GLUCOSAMINE HCL/CHONDROITIN SU 500-400 MG
CAPSULE ORAL
Qty: 100 EACH | Refills: 3 | Status: SHIPPED | OUTPATIENT
Start: 2021-11-16 | End: 2023-12-17

## 2021-11-16 ASSESSMENT — PATIENT HEALTH QUESTIONNAIRE - PHQ9
SUM OF ALL RESPONSES TO PHQ9 QUESTIONS 1 AND 2: 2
3. TROUBLE FALLING OR STAYING ASLEEP OR SLEEPING TOO MUCH: MORE THAN HALF THE DAYS
7. TROUBLE CONCENTRATING ON THINGS, SUCH AS READING THE NEWSPAPER OR WATCHING TELEVISION: SEVERAL DAYS
6. FEELING BAD ABOUT YOURSELF - OR THAT YOU ARE A FAILURE OR HAVE LET YOURSELF OR YOUR FAMILY DOWN: MORE THAN HALF THE DAYS
1. LITTLE INTEREST OR PLEASURE IN DOING THINGS: SEVERAL DAYS
SUM OF ALL RESPONSES TO PHQ QUESTIONS 1-9: 10
5. POOR APPETITE OR OVEREATING: MORE THAN HALF THE DAYS
2. FEELING DOWN, DEPRESSED, IRRITABLE, OR HOPELESS: SEVERAL DAYS
4. FEELING TIRED OR HAVING LITTLE ENERGY: NOT AT ALL
9. THOUGHTS THAT YOU WOULD BE BETTER OFF DEAD, OR OF HURTING YOURSELF: SEVERAL DAYS
8. MOVING OR SPEAKING SO SLOWLY THAT OTHER PEOPLE COULD HAVE NOTICED. OR THE OPPOSITE, BEING SO FIGETY OR RESTLESS THAT YOU HAVE BEEN MOVING AROUND A LOT MORE THAN USUAL: NOT AT ALL

## 2021-11-16 NOTE — NON-PROVIDER
Cc: follow-up labs     Subjective:     Gabriella Mckee is a 76 y.o. female presenting for follow-up labs. Patient had labs drawn on 11/1/2021 and has an elevated A1c and fasting blood glucose. Patient states over the past couple of years ans he has not been eating well and taking care of her herself like she used to. Patient is concerned about whether she can improve her blood sugars with eating well and exercising.      Patient presents to the office for bilateral calf pain and swelling, left worse than right for 2 years. Patient states when she wakes in the morning her calves are not swollen or painful. When she walks long distances she experiences pain in her back that radiates down her legs. .[facility] and was diagnosed with in spinal stenosis in December. She received a steroid injection at that time. Patient in agreement to receive another steroid injection today.    Patient presents to the office for depression. Patient states she has been feeling depressed over the last two years. She states she does feel suicidal at times but does not have a plan for she a dog at home that she could not leave behind. Patient is currently taking 40 mg of Prozac. Patient states she was seeing a psychiatrist 2 years ago in [place] for her depression and anxiety. Patient interested in seeing a counselor to talk about her symptoms.     Review of systems:  See above.   Denies any symptoms unless previously indicated.        Current Outpatient Medications:   •  metFORMIN (GLUCOPHAGE) 500 MG Tab, Take 1 Tablet by mouth 2 times a day with meals., Disp: 60 Tablet, Rfl: 3  •  Blood Glucose Test Strips, Use one One Touch Verio strip or insurance preference to test blood sugar twice daily., Disp: 100 Strip, Rfl: 0  •  Lancets, Use one One Touch Verio lancet or insurance preference to test blood sugar twice daily., Disp: 100 Each, Rfl: 0  •  Alcohol Swabs, Wipe site with prep pad prior to injection.,  "Disp: 100 Each, Rfl: 0  •  Blood Glucose Meter Kit, Test blood sugar as recommended by provider. One Touch Verio or insurance preference blood glucose monitoring kit., Disp: 1 Kit, Rfl: 0  •  Calcium Carbonate (CALCIUM 600) 600 MG Tab, Take  by mouth., Disp: , Rfl:   •  fluoxetine (PROZAC) 40 MG capsule, Take 40 mg by mouth every day., Disp: , Rfl:   •  alprazolam (XANAX) 0.5 MG Tab, Take 0.5 mg by mouth at bedtime as needed for Sleep., Disp: , Rfl:   •  B Complex-C-E-Zn (STRESS B-COMPLEX/VIT C/ZINC PO), Take  by mouth., Disp: , Rfl:   •  naproxen (NAPROSYN) 500 MG Tab, , Disp: , Rfl: 0  •  mirtazapine (REMERON) 15 MG Tab, Take 15 mg by mouth every evening., Disp: , Rfl:     Allergies, past medical history, past surgical history, family history, social history reviewed and updated    Objective:     Vitals: /60 (BP Location: Left arm, Patient Position: Sitting, BP Cuff Size: Large adult)   Pulse 66   Temp 36.2 °C (97.1 °F) (Temporal)   Resp 16   Ht 1.753 m (5' 9\") Comment: obtained from chart  Wt 82.1 kg (181 lb) Comment: with shoes on  SpO2 96%   BMI 26.73 kg/m²   General: Alert, pleasant, NAD  EYES:   PERRL, EOMI, no icterus or pallor.  Conjunctivae and lids normal.   HENT:  Normocephalic.  External ears normal.   Neck supple.  Heart: Regular rate and rhythm.  S1 and S2 normal.  No murmurs appreciated.  Respiratory: Normal respiratory effort.  Clear to auscultation bilaterally.  Skin: Warm, dry, no rashes.  Musculoskeletal: Gait is normal.  Moves all extremities well.    Extremities: No erythema or swelling present in bilateral lower extremities. No varicose veins present in bilateral extremities. Dorsalis pulses symmetrical bilaterally. Negative Lindsey's sign bilaterally.  Neurological: No tremors,CN2-12 intact.  Psych:  Affect/mood is normal, judgement is good, memory is intact, grooming is appropriate.    Assessment/Plan:     Gabriella was seen today for results and leg swelling.    Diagnoses and all " orders for this visit:    Uncontrolled type 2 diabetes mellitus with hyperglycemia (HCC)  -     metFORMIN (GLUCOPHAGE) 500 MG Tab; Take 1 Tablet by mouth 2 times a day with meals.  -     Referral to Nutrition Services  -     Blood Glucose Test Strips; Use one One Touch Verio strip or insurance preference to test blood sugar twice daily.  -     Lancets; Use one One Touch Verio lancet or insurance preference to test blood sugar twice daily.  -     Alcohol Swabs; Wipe site with prep pad prior to injection.  -     Blood Glucose Meter Kit; Test blood sugar as recommended by provider. One Touch Verio or insurance preference blood glucose monitoring kit.  -     Lipid Profile; Future  -     Comp Metabolic Panel; Future  -     HEMOGLOBIN A1C; Future    Patient was prescribed metformin 500 mg twice daily for the treatment of her type 2 diabetes. Side effects of medication discussed with patient.Patient was also prescribed a glucometer, test strips, and lancets to monitor blood sugar twice daily. We will discuss an opthalmology referral and perform a monofilament exam at next appointment. We will repeat labs in 3 months for reevaluation. Patient will follow-up in 4 weeks to determine how patient is doing with new medication and monitoring blood glucose.    Sciatica of left side  -     Referral to Neurosurgery    A new referral for neurosurgery was written today for the treatment of patient's sciatica.     Moderate episode of recurrent major depressive disorder (HCC)  -     Referral to Behavioral Health     A referral for behavioral [facility] was written today. A safety contract was written today as well. Patient did not want to increase her Prozac at this time. We will follow-up in 4 weeks to see how she is doing. Patient advised to contact us sooner or seek emergency medical attention if her condition worsens. Patient agrees with this plan.       Return in about 4 weeks (around 12/14/2021), or if symptoms worsen or fail to  improve, for sooner if needed. .

## 2021-11-16 NOTE — PROGRESS NOTES
cc:  Follow up labs    Subjective:     Gabriella Mckee is a 76 y.o. female presenting for follow up labs      Patient presents to the office for follow up labs .  She comes in my request due to her new onset diabetes test results.  She is here to discuss our next steps.  She does have several questions.  Her main concern is the ability to reverse this diagnosis.    Patient is having bilateral leg swelling with it worse on the left vs the right..  Patient is seeing [facility]. Patient states that this has been an intermittent issue for years.  She states that the pain is worse with walking and she will have a sharp pain starting in her low back and pulling in the left leg.  It is worse if she sits too long.  She was told by [facility] that she had spinal stenosis and was given an injection last December.      Patient is complaining of depression.  She is currently taking fluoxetine 40 mg daily.  She has been filled out the depression questionnaire at this time and does indicate that she has had thoughts of self-harm.  She does not have a plan as to how she would hurt herself and her reason she truly does not want to harm herself as she has a dog and she would like to be around to take care of the dog.    Review of systems:  See above.   Denies any symptoms unless previously indicated.        Current Outpatient Medications:   •  metFORMIN (GLUCOPHAGE) 500 MG Tab, Take 1 Tablet by mouth 2 times a day with meals., Disp: 60 Tablet, Rfl: 3  •  Blood Glucose Test Strips, Use one One Touch Verio strip or insurance preference to test blood sugar twice daily., Disp: 100 Strip, Rfl: 0  •  Lancets, Use one One Touch Verio lancet or insurance preference to test blood sugar twice daily., Disp: 100 Each, Rfl: 0  •  Alcohol Swabs, Wipe site with prep pad prior to injection., Disp: 100 Each, Rfl: 0  •  Blood Glucose Meter Kit, Test blood sugar as recommended by provider. One Touch Verio or insurance preference blood  "glucose monitoring kit., Disp: 1 Kit, Rfl: 0  •  Calcium Carbonate (CALCIUM 600) 600 MG Tab, Take  by mouth., Disp: , Rfl:   •  fluoxetine (PROZAC) 40 MG capsule, Take 40 mg by mouth every day., Disp: , Rfl:   •  alprazolam (XANAX) 0.5 MG Tab, Take 0.5 mg by mouth at bedtime as needed for Sleep., Disp: , Rfl:   •  B Complex-C-E-Zn (STRESS B-COMPLEX/VIT C/ZINC PO), Take  by mouth., Disp: , Rfl:   •  naproxen (NAPROSYN) 500 MG Tab, , Disp: , Rfl: 0  •  mirtazapine (REMERON) 15 MG Tab, Take 15 mg by mouth every evening., Disp: , Rfl:     Allergies, past medical history, past surgical history, family history, social history reviewed and updated    Objective:     Vitals: /60 (BP Location: Left arm, Patient Position: Sitting, BP Cuff Size: Large adult)   Pulse 66   Temp 36.2 °C (97.1 °F) (Temporal)   Resp 16   Ht 1.753 m (5' 9\") Comment: obtained from chart  Wt 82.1 kg (181 lb) Comment: with shoes on  SpO2 96%   BMI 26.73 kg/m²   General: Alert, pleasant, NAD  EYES:   PERRL, EOMI, no icterus or pallor.  Conjunctivae and lids normal.   HENT:  Normocephalic.  External ears normal.  Neck supple.      Respiratory: Normal respiratory effort.    Abdomen: Not obese  Skin: Warm, dry, no rashes.  Musculoskeletal: Gait is normal.  Moves all extremities well.    Extremities: no lower limb swelling.   Neurological: No tremors, sensation grossly intact,  gait is normal, CN2-12 intact.  Psych:  Affect/mood is depressedl, judgement is good, memory is intact, grooming is appropriate.    Assessment/Plan:     Gabriella was seen today for results and leg swelling.    Diagnoses and all orders for this visit:    Uncontrolled type 2 diabetes mellitus with hyperglycemia (HCC)  -     metFORMIN (GLUCOPHAGE) 500 MG Tab; Take 1 Tablet by mouth 2 times a day with meals.  -     Referral to Nutrition Services  -     Blood Glucose Test Strips; Use one One Touch Verio strip or insurance preference to test blood sugar twice daily.  -     " Lancets; Use one One Touch Verio lancet or insurance preference to test blood sugar twice daily.  -     Alcohol Swabs; Wipe site with prep pad prior to injection.  -     Blood Glucose Meter Kit; Test blood sugar as recommended by provider. One Touch Verio or insurance preference blood glucose monitoring kit.  -     Lipid Profile; Future  -     Comp Metabolic Panel; Future  -     HEMOGLOBIN A1C; Future  Elevated LDL cholesterol level    Patient does have new onset diabetes.  Although there are several items that are needed at this time, we started with some basics as to not to overwhelm patient.  We will start her on Metformin 500 mg twice a day and discussed side effects.  We will also provide her with a glucometer as well as test strips and lancets so that she can check her sugar levels.  Patient is interested in adjusting diet and so we will refer to a nutritionist to help her.  We will follow-up in 1 month but we will not repeat labs in any sooner than 3 months.  Cholesterol is also elevated.  LDL goal is 70 or less.  Follow-up we will need to discuss statin medication.    Sciatica of left side  -     Referral to Neurosurgery  Spinal stenosis of lumbar region, unspecified whether neurogenic claudication present    Patient previously had an injection with [facility] [place].  As symptoms are recurring and worsening, we will submit a new referral so that she can follow-up.    Moderate episode of recurrent major depressive disorder (HCC)  -     Referral to Behavioral Health    Discussed increasing the Prozac.  However patient is not wanting to proceed with this.  We will start her with a referral to behavioral health for further evaluation.  No harm contract was signed today.        Return in about 4 weeks (around 12/14/2021), or if symptoms worsen or fail to improve, for sooner if needed. .    Please note that this dictation was created using voice recognition software. I have made every reasonable attempt to correct  obvious errors, but expect that there are errors of grammar and possible content that I did not discover before finalizing note.

## 2021-11-17 NOTE — TELEPHONE ENCOUNTER
Was the patient seen in the last year in this department? Yes    Does patient have an active prescription for medications requested? Yes    Received Request Via: Pharmacy    Hospital Outpatient Visit on 11/01/2021   Component Date Value   • Glycohemoglobin 11/01/2021 7.2*   • Est Avg Glucose 11/01/2021 160    • Cholesterol,Tot 11/01/2021 173    • Triglycerides 11/01/2021 111    • HDL 11/01/2021 43    • LDL 11/01/2021 108*   • Sodium 11/01/2021 140    • Potassium 11/01/2021 4.2    • Chloride 11/01/2021 105    • Co2 11/01/2021 23    • Anion Gap 11/01/2021 12.0    • Glucose 11/01/2021 144*   • Bun 11/01/2021 15    • Creatinine 11/01/2021 0.69    • Calcium 11/01/2021 9.1    • AST(SGOT) 11/01/2021 32    • ALT(SGPT) 11/01/2021 43    • Alkaline Phosphatase 11/01/2021 82    • Total Bilirubin 11/01/2021 0.3    • Albumin 11/01/2021 4.3    • Total Protein 11/01/2021 7.0    • Globulin 11/01/2021 2.7    • A-G Ratio 11/01/2021 1.6    • Hepatitis C Antibody 11/01/2021 Non-Reactive    • GFR If  11/01/2021 >60    • GFR If Non  Ameri* 11/01/2021 >60    ]

## 2022-02-08 ENCOUNTER — TELEPHONE (OUTPATIENT)
Dept: MEDICAL GROUP | Facility: CLINIC | Age: 77
End: 2022-02-08

## 2022-02-08 NOTE — TELEPHONE ENCOUNTER
Phone Number Called: [phone] (home)     Call outcome: Did not leave a detailed message. Requested patient to call back.    Message: LVM to schedule apt. To review results and also sending mychart.

## 2022-02-10 ENCOUNTER — OFFICE VISIT (OUTPATIENT)
Dept: MEDICAL GROUP | Facility: CLINIC | Age: 77
End: 2022-02-10
Payer: MEDICARE

## 2022-02-10 VITALS
HEIGHT: 69 IN | DIASTOLIC BLOOD PRESSURE: 64 MMHG | BODY MASS INDEX: 25.56 KG/M2 | WEIGHT: 172.6 LBS | TEMPERATURE: 97 F | SYSTOLIC BLOOD PRESSURE: 126 MMHG | OXYGEN SATURATION: 96 % | HEART RATE: 62 BPM

## 2022-02-10 DIAGNOSIS — E11.65 UNCONTROLLED TYPE 2 DIABETES MELLITUS WITH HYPERGLYCEMIA (HCC): ICD-10-CM

## 2022-02-10 DIAGNOSIS — M85.89 OSTEOPENIA OF MULTIPLE SITES: ICD-10-CM

## 2022-02-10 DIAGNOSIS — I73.00 RAYNAUD'S PHENOMENON WITHOUT GANGRENE: ICD-10-CM

## 2022-02-10 DIAGNOSIS — E78.00 ELEVATED LDL CHOLESTEROL LEVEL: ICD-10-CM

## 2022-02-10 DIAGNOSIS — F33.1 MODERATE EPISODE OF RECURRENT MAJOR DEPRESSIVE DISORDER (HCC): ICD-10-CM

## 2022-02-10 PROCEDURE — 99214 OFFICE O/P EST MOD 30 MIN: CPT | Performed by: PHYSICIAN ASSISTANT

## 2022-02-10 RX ORDER — ROSUVASTATIN CALCIUM 10 MG/1
10 TABLET, COATED ORAL EVERY EVENING
Qty: 90 TABLET | Refills: 2 | Status: SHIPPED | OUTPATIENT
Start: 2022-02-10 | End: 2023-05-11

## 2022-02-10 ASSESSMENT — PATIENT HEALTH QUESTIONNAIRE - PHQ9
SUM OF ALL RESPONSES TO PHQ QUESTIONS 1-9: 5
CLINICAL INTERPRETATION OF PHQ2 SCORE: 2
5. POOR APPETITE OR OVEREATING: 1 - SEVERAL DAYS

## 2022-02-10 NOTE — PROGRESS NOTES
cc:  Follow up testing    Subjective:     Gabriella Mckee is a 76 y.o. female presenting for follow up testing      Patient presents to the office for follow up testing. Patient states that she has been having some low sugars.  She has also been feeling cold and having some headaches.  She notices this more when her sugars are lower.  Patient also has an elevated LDL level and is due for statin therapy.    Patient states that she does not feel as depressed as she did last years.  She feels that she is improving.  She would like to continue her current dose of Prozac at this time.    Patient did have a bone density scan.  She would like to discuss those results today.    Patient indicates having some cold sensation in her toes as well as her fingers.  She is not sure if this is a medication related issue.  She finds that she has to use hot water to help warm her hands up.  Nothing specific makes it worse but is concerned about it.    Review of systems:  See above.   Denies any symptoms unless previously indicated.        Current Outpatient Medications:   •  rosuvastatin (CRESTOR) 10 MG Tab, Take 1 Tablet by mouth every evening., Disp: 90 Tablet, Rfl: 2  •  metFORMIN (GLUCOPHAGE) 500 MG Tab, Take 1 Tablet by mouth 2 times a day with meals., Disp: 180 Tablet, Rfl: 2  •  Blood Glucose Test Strips, Use one One Touch Verio strip or insurance preference to test blood sugar twice daily., Disp: 100 Strip, Rfl: 0  •  Lancets, Use one One Touch Verio lancet or insurance preference to test blood sugar twice daily., Disp: 100 Each, Rfl: 0  •  Blood Glucose Meter Kit, Test blood sugar as recommended by provider. One Touch Verio or insurance preference blood glucose monitoring kit., Disp: 1 Kit, Rfl: 0  •  Alcohol Swabs, Wipe site with prep pad prior to injection., Disp: 100 Each, Rfl: 3  •  Calcium Carbonate (CALCIUM 600) 600 MG Tab, Take  by mouth., Disp: , Rfl:   •  fluoxetine (PROZAC) 40 MG capsule, Take 40 mg by mouth every  "day., Disp: , Rfl:   •  alprazolam (XANAX) 0.5 MG Tab, Take 0.5 mg by mouth at bedtime as needed for Sleep., Disp: , Rfl:   •  mirtazapine (REMERON) 15 MG Tab, Take 15 mg by mouth every evening., Disp: , Rfl:   •  B Complex-C-E-Zn (STRESS B-COMPLEX/VIT C/ZINC PO), Take  by mouth., Disp: , Rfl:   •  naproxen (NAPROSYN) 500 MG Tab, , Disp: , Rfl: 0    Allergies, past medical history, past surgical history, family history, social history reviewed and updated    Objective:     Vitals: /64 (BP Location: Left arm, Patient Position: Sitting, BP Cuff Size: Adult)   Pulse 62   Temp 36.1 °C (97 °F) (Temporal)   Ht 1.753 m (5' 9\")   Wt 78.3 kg (172 lb 9.6 oz)   SpO2 96%   BMI 25.49 kg/m²   General: Alert, pleasant, NAD  EYES:   PERRL, EOMI, no icterus or pallor.  Conjunctivae and lids normal.   HENT:  Normocephalic.  External ears normal.  Neck supple.    Respiratory: Normal respiratory effort.   Abdomen: Not obese  Skin: Warm, dry, no rashes.  Musculoskeletal: Gait is normal.  Moves all extremities well.    Extremities: normal range of motion all extremities.   Neurological: No tremors, sensation grossly intact, CN2-12 intact.  Psych:  Affect/mood is normal, judgement is good, memory is intact, grooming is appropriate.    Assessment/Plan:     Gabriella was seen today for medication management and follow-up.    Diagnoses and all orders for this visit:    Uncontrolled type 2 diabetes mellitus with hyperglycemia (HCC)  -     Lipid Profile; Future  -     Comp Metabolic Panel; Future  -     HEMOGLOBIN A1C; Future  -     metFORMIN (GLUCOPHAGE) 500 MG Tab; Take 1 Tablet by mouth 2 times a day with meals.  -     MICROALBUMIN CREAT RATIO URINE; Future    Labs have been ordered to be repeated in 3 months.  Patient will work on improved diet and exercise in the meantime.  She does acknowledge wanting to have a Sunday on occasion.  Patient is aware of potential risks and issues.  We did discuss goals for range of sugars.  How to " compensate for a low sugar levels.  Also what can cause elevated sugars for patient.    Elevated LDL cholesterol level  -     Lipid Profile; Future  -     Comp Metabolic Panel; Future  -     rosuvastatin (CRESTOR) 10 MG Tab; Take 1 Tablet by mouth every evening.    Labs have been ordered to evaluate further which will be repeated in 3 months.    Moderate episode of recurrent major depressive disorder (HCC)    Patient is doing well with medication.  We will continue with current dose.    Osteopenia of multiple sites    Bone density reviewed.  Patient has osteopenia with some decrease in bone density.  We will have her begin calcium supplements 2400 mg a day    Raynaud's phenomenon without gangrene    Believe this is most likely issue.  We will continue to follow-up as needed.        Return in about 3 months (around 5/10/2022), or if symptoms worsen or fail to improve.    Please note that this dictation was created using voice recognition software. I have made every reasonable attempt to correct obvious errors, but expect that there are errors of grammar and possible content that I did not discover before finalizing note.

## 2022-05-12 ENCOUNTER — OFFICE VISIT (OUTPATIENT)
Dept: MEDICAL GROUP | Facility: CLINIC | Age: 77
End: 2022-05-12
Payer: MEDICARE

## 2022-05-12 DIAGNOSIS — E11.65 UNCONTROLLED TYPE 2 DIABETES MELLITUS WITH HYPERGLYCEMIA (HCC): ICD-10-CM

## 2022-05-12 DIAGNOSIS — R03.0 ELEVATED BLOOD PRESSURE READING: ICD-10-CM

## 2022-05-12 DIAGNOSIS — N20.0 KIDNEY STONE: ICD-10-CM

## 2022-05-12 PROCEDURE — 99214 OFFICE O/P EST MOD 30 MIN: CPT | Performed by: PHYSICIAN ASSISTANT

## 2022-05-12 NOTE — PROGRESS NOTES
cc: Betty    Subjective:     Gabriella Mckee is a 76 y.o. female presenting for betty    Patient presented to the office as a walk-in complaining of feeling shaky, feeling cold, having irregular and abnormal sugars that she has had a hard time controlling.  Blood pressure was 182/80.  Temperature was 98.7, oxygen was 97% and pulse was 84.  Patient has been in the hospital with a kidney stone.  She denies any symptoms with urination.  However my concern based on symptoms, difficulty to control sugars, elevated blood pressure, and recent kidney stone is that patient has developed an infection and may be septic.  I have advised patient and friend to go to the ER.  They have agreed to do this.  We discussed calling ambulance and they chose to proceed by private vehicle.    As of note, patient has since switched her care to Jorge Craft.  Advised patient that I am happy to see her again if she chooses, if not I recommend she follow-up with her new primary care provider.     Review of systems:  See above.   Denies any symptoms unless previously indicated.        Current Outpatient Medications:   •  rosuvastatin (CRESTOR) 10 MG Tab, Take 1 Tablet by mouth every evening., Disp: 90 Tablet, Rfl: 2  •  metFORMIN (GLUCOPHAGE) 500 MG Tab, Take 1 Tablet by mouth 2 times a day with meals., Disp: 180 Tablet, Rfl: 2  •  Blood Glucose Test Strips, Use one One Touch Verio strip or insurance preference to test blood sugar twice daily., Disp: 100 Strip, Rfl: 0  •  Lancets, Use one One Touch Verio lancet or insurance preference to test blood sugar twice daily., Disp: 100 Each, Rfl: 0  •  Blood Glucose Meter Kit, Test blood sugar as recommended by provider. One Touch Verio or insurance preference blood glucose monitoring kit., Disp: 1 Kit, Rfl: 0  •  Alcohol Swabs, Wipe site with prep pad prior to injection., Disp: 100 Each, Rfl: 3  •  naproxen (NAPROSYN) 500 MG Tab, , Disp: , Rfl: 0  •  Calcium Carbonate (CALCIUM 600) 600  MG Tab, Take  by mouth., Disp: , Rfl:   •  fluoxetine (PROZAC) 40 MG capsule, Take 40 mg by mouth every day., Disp: , Rfl:   •  alprazolam (XANAX) 0.5 MG Tab, Take 0.5 mg by mouth at bedtime as needed for Sleep., Disp: , Rfl:   •  mirtazapine (REMERON) 15 MG Tab, Take 15 mg by mouth every evening., Disp: , Rfl:   •  B Complex-C-E-Zn (STRESS B-COMPLEX/VIT C/ZINC PO), Take  by mouth., Disp: , Rfl:     Allergies, past medical history, past surgical history, family history, social history reviewed and updated    Objective:     Vitals: There were no vitals taken for this visit.  General: Alert, pleasant, NAD  EYES:   PERRL, EOMI, no icterus or pallor.  Conjunctivae and lids normal.   HENT:  Normocephalic.  External ears normal. Neck supple.     Heart: Regular rate and rhythm.  S1 and S2 normal.  No murmurs appreciated.  Respiratory: Normal respiratory effort.  Clear to auscultation bilaterally.  Abdomen: Not obese.  Skin: Warm, dry, no rashes.  Musculoskeletal: Walking with aid of friend.  Neurological: No tremors, sensation grossly intact,  CN2-12 intact.  Psych: Patient seems confused.  Shaking in office..    Assessment/Plan:     Diagnoses and all orders for this visit:    Uncontrolled type 2 diabetes mellitus with hyperglycemia (HCC)    Kidney stone    Elevated blood pressure reading        Patient is being escorted to ER with friend in private vehicle.  Again ambulance was discussed.  Recommend she follow-up with her primary care provider.    No follow-ups on file.    Please note that this dictation was created using voice recognition software. I have made every reasonable attempt to correct obvious errors, but expect that there are errors of grammar and possible content that I did not discover before finalizing note.

## 2022-10-19 PROBLEM — M17.9 OSTEOARTHRITIS, KNEE: Status: ACTIVE | Noted: 2022-10-19

## 2022-11-03 ENCOUNTER — PATIENT MESSAGE (OUTPATIENT)
Dept: HEALTH INFORMATION MANAGEMENT | Facility: OTHER | Age: 77
End: 2022-11-03

## 2023-01-03 ENCOUNTER — PRE-ADMISSION TESTING (OUTPATIENT)
Dept: ADMISSIONS | Facility: MEDICAL CENTER | Age: 78
End: 2023-01-03
Attending: ORTHOPAEDIC SURGERY
Payer: MEDICARE

## 2023-01-03 RX ORDER — ACETAMINOPHEN 500 MG
500-1000 TABLET ORAL EVERY 6 HOURS PRN
COMMUNITY

## 2023-01-03 RX ORDER — BLOOD SUGAR DIAGNOSTIC
1 STRIP MISCELLANEOUS
COMMUNITY
Start: 2022-12-07 | End: 2023-05-11

## 2023-01-03 NOTE — PREPROCEDURE INSTRUCTIONS
Telephone appt. Hx and meds reviewed, pre op instructions given, handouts reviewed and emailed, questions answered.  Pt instructed to continue regularly prescribed medications through day before surgery.Per anesthesia protocol pt instructed to take these medications with a sip of water the day of surgery-  prozac, Tylenol and xanax if needed ,   Anesthesia fasting guidelines reviewed with pt. Pt aware to continue to wear a mask and report any sx of cold/flu.  Pt referred to Haydee GALLO for case management.

## 2023-01-03 NOTE — DISCHARGE PLANNING
DISCHARGE PLANNING NOTE - TOTAL JOINT    Procedure: Procedure(s):  ARTHROPLASTY, KNEE, TOTAL, RIGHT  Procedure Date: 1/17/2023  Insurance: Payor: MEDICARE / Plan: MEDICARE PART A & B / Product Type: *No Product type* /    Equipment currently available at home?  shower chair  Steps into the home? 0  Steps within the home? 1  Toilet height? Standard  Type of shower? walk-in shower  Who will be with you during your recovery? friend  Is Outpatient Physical Therapy set up after surgery? Yes  Did you take the Total Joint Class and where? No   Planning same day discharge?No     This writer spoke on phone with pt as  part of her preadmission appt. Pt notified of possible overnight stay.  Home safety checklist reviewed and copy given to pt. Pt educated to dc criteria. All questions answered and verbalizes understanding of all instructions. No dc needs identified at this time. Anticipate dc to home without barriers.

## 2023-01-06 ENCOUNTER — PRE-ADMISSION TESTING (OUTPATIENT)
Dept: ADMISSIONS | Facility: MEDICAL CENTER | Age: 78
End: 2023-01-06
Attending: ORTHOPAEDIC SURGERY
Payer: MEDICARE

## 2023-01-06 DIAGNOSIS — Z01.810 PRE-OPERATIVE CARDIOVASCULAR EXAMINATION: ICD-10-CM

## 2023-01-06 DIAGNOSIS — Z01.812 PRE-OPERATIVE LABORATORY EXAMINATION: ICD-10-CM

## 2023-01-06 LAB
ANION GAP SERPL CALC-SCNC: 13 MMOL/L (ref 7–16)
BUN SERPL-MCNC: 18 MG/DL (ref 8–22)
CALCIUM SERPL-MCNC: 9.1 MG/DL (ref 8.4–10.2)
CHLORIDE SERPL-SCNC: 104 MMOL/L (ref 96–112)
CO2 SERPL-SCNC: 24 MMOL/L (ref 20–33)
CREAT SERPL-MCNC: 0.88 MG/DL (ref 0.5–1.4)
EKG IMPRESSION: NORMAL
ERYTHROCYTE [DISTWIDTH] IN BLOOD BY AUTOMATED COUNT: 41.3 FL (ref 35.9–50)
EST. AVERAGE GLUCOSE BLD GHB EST-MCNC: 154 MG/DL
GFR SERPLBLD CREATININE-BSD FMLA CKD-EPI: 68 ML/MIN/1.73 M 2
GLUCOSE SERPL-MCNC: 178 MG/DL (ref 65–99)
HBA1C MFR BLD: 7 % (ref 4–5.6)
HCT VFR BLD AUTO: 40.4 % (ref 37–47)
HGB BLD-MCNC: 13 G/DL (ref 12–16)
MCH RBC QN AUTO: 29.7 PG (ref 27–33)
MCHC RBC AUTO-ENTMCNC: 32.2 G/DL (ref 33.6–35)
MCV RBC AUTO: 92.2 FL (ref 81.4–97.8)
PLATELET # BLD AUTO: 186 K/UL (ref 164–446)
PMV BLD AUTO: 11.6 FL (ref 9–12.9)
POTASSIUM SERPL-SCNC: 3.9 MMOL/L (ref 3.6–5.5)
RBC # BLD AUTO: 4.38 M/UL (ref 4.2–5.4)
SCCMEC + MECA PNL NOSE NAA+PROBE: NEGATIVE
SCCMEC + MECA PNL NOSE NAA+PROBE: POSITIVE
SODIUM SERPL-SCNC: 141 MMOL/L (ref 135–145)
WBC # BLD AUTO: 6 K/UL (ref 4.8–10.8)

## 2023-01-06 PROCEDURE — 80048 BASIC METABOLIC PNL TOTAL CA: CPT

## 2023-01-06 PROCEDURE — 83036 HEMOGLOBIN GLYCOSYLATED A1C: CPT

## 2023-01-06 PROCEDURE — 87640 STAPH A DNA AMP PROBE: CPT | Mod: XU

## 2023-01-06 PROCEDURE — 87641 MR-STAPH DNA AMP PROBE: CPT

## 2023-01-06 PROCEDURE — 36415 COLL VENOUS BLD VENIPUNCTURE: CPT

## 2023-01-06 PROCEDURE — 93005 ELECTROCARDIOGRAM TRACING: CPT

## 2023-01-06 PROCEDURE — 85027 COMPLETE CBC AUTOMATED: CPT

## 2023-01-06 PROCEDURE — 93010 ELECTROCARDIOGRAM REPORT: CPT | Performed by: INTERNAL MEDICINE

## 2023-01-17 ENCOUNTER — ANESTHESIA (OUTPATIENT)
Dept: SURGERY | Facility: MEDICAL CENTER | Age: 78
End: 2023-01-17
Payer: MEDICARE

## 2023-01-17 ENCOUNTER — ANESTHESIA EVENT (OUTPATIENT)
Dept: SURGERY | Facility: MEDICAL CENTER | Age: 78
End: 2023-01-17
Payer: MEDICARE

## 2023-01-17 ENCOUNTER — HOSPITAL ENCOUNTER (OUTPATIENT)
Facility: MEDICAL CENTER | Age: 78
End: 2023-01-18
Attending: ORTHOPAEDIC SURGERY | Admitting: ORTHOPAEDIC SURGERY
Payer: MEDICARE

## 2023-01-17 DIAGNOSIS — Z96.651 TOTAL KNEE REPLACEMENT STATUS, RIGHT: ICD-10-CM

## 2023-01-17 DIAGNOSIS — M17.11 PRIMARY OSTEOARTHRITIS OF RIGHT KNEE: Primary | ICD-10-CM

## 2023-01-17 LAB
GLUCOSE BLD STRIP.AUTO-MCNC: 107 MG/DL (ref 65–99)
GLUCOSE BLD STRIP.AUTO-MCNC: 144 MG/DL (ref 65–99)
GLUCOSE BLD STRIP.AUTO-MCNC: 157 MG/DL (ref 65–99)
GLUCOSE BLD STRIP.AUTO-MCNC: 181 MG/DL (ref 65–99)

## 2023-01-17 PROCEDURE — 700111 HCHG RX REV CODE 636 W/ 250 OVERRIDE (IP): Performed by: STUDENT IN AN ORGANIZED HEALTH CARE EDUCATION/TRAINING PROGRAM

## 2023-01-17 PROCEDURE — 96376 TX/PRO/DX INJ SAME DRUG ADON: CPT

## 2023-01-17 PROCEDURE — 99100 ANES PT EXTEME AGE<1 YR&>70: CPT | Performed by: STUDENT IN AN ORGANIZED HEALTH CARE EDUCATION/TRAINING PROGRAM

## 2023-01-17 PROCEDURE — 27447 TOTAL KNEE ARTHROPLASTY: CPT | Mod: RT | Performed by: ORTHOPAEDIC SURGERY

## 2023-01-17 PROCEDURE — 700101 HCHG RX REV CODE 250: Performed by: STUDENT IN AN ORGANIZED HEALTH CARE EDUCATION/TRAINING PROGRAM

## 2023-01-17 PROCEDURE — 700105 HCHG RX REV CODE 258: Performed by: ORTHOPAEDIC SURGERY

## 2023-01-17 PROCEDURE — A9270 NON-COVERED ITEM OR SERVICE: HCPCS | Performed by: ORTHOPAEDIC SURGERY

## 2023-01-17 PROCEDURE — 502000 HCHG MISC OR IMPLANTS RC 0278: Performed by: ORTHOPAEDIC SURGERY

## 2023-01-17 PROCEDURE — 96372 THER/PROPH/DIAG INJ SC/IM: CPT

## 2023-01-17 PROCEDURE — 160048 HCHG OR STATISTICAL LEVEL 1-5: Performed by: ORTHOPAEDIC SURGERY

## 2023-01-17 PROCEDURE — A9270 NON-COVERED ITEM OR SERVICE: HCPCS | Performed by: STUDENT IN AN ORGANIZED HEALTH CARE EDUCATION/TRAINING PROGRAM

## 2023-01-17 PROCEDURE — 160036 HCHG PACU - EA ADDL 30 MINS PHASE I: Performed by: ORTHOPAEDIC SURGERY

## 2023-01-17 PROCEDURE — 700111 HCHG RX REV CODE 636 W/ 250 OVERRIDE (IP): Performed by: ORTHOPAEDIC SURGERY

## 2023-01-17 PROCEDURE — 82962 GLUCOSE BLOOD TEST: CPT | Mod: 91

## 2023-01-17 PROCEDURE — 160009 HCHG ANES TIME/MIN: Performed by: ORTHOPAEDIC SURGERY

## 2023-01-17 PROCEDURE — 160002 HCHG RECOVERY MINUTES (STAT): Performed by: ORTHOPAEDIC SURGERY

## 2023-01-17 PROCEDURE — 96375 TX/PRO/DX INJ NEW DRUG ADDON: CPT | Mod: XU

## 2023-01-17 PROCEDURE — 700101 HCHG RX REV CODE 250: Performed by: ORTHOPAEDIC SURGERY

## 2023-01-17 PROCEDURE — 160029 HCHG SURGERY MINUTES - 1ST 30 MINS LEVEL 4: Performed by: ORTHOPAEDIC SURGERY

## 2023-01-17 PROCEDURE — 700102 HCHG RX REV CODE 250 W/ 637 OVERRIDE(OP): Performed by: STUDENT IN AN ORGANIZED HEALTH CARE EDUCATION/TRAINING PROGRAM

## 2023-01-17 PROCEDURE — 700102 HCHG RX REV CODE 250 W/ 637 OVERRIDE(OP): Performed by: ORTHOPAEDIC SURGERY

## 2023-01-17 PROCEDURE — 502240 HCHG MISC OR SUPPLY RC 0272: Performed by: ORTHOPAEDIC SURGERY

## 2023-01-17 PROCEDURE — C1776 JOINT DEVICE (IMPLANTABLE): HCPCS | Performed by: ORTHOPAEDIC SURGERY

## 2023-01-17 PROCEDURE — 27447 TOTAL KNEE ARTHROPLASTY: CPT | Mod: ASROC,RT | Performed by: STUDENT IN AN ORGANIZED HEALTH CARE EDUCATION/TRAINING PROGRAM

## 2023-01-17 PROCEDURE — 64447 NJX AA&/STRD FEMORAL NRV IMG: CPT | Mod: 59,RT | Performed by: STUDENT IN AN ORGANIZED HEALTH CARE EDUCATION/TRAINING PROGRAM

## 2023-01-17 PROCEDURE — 160041 HCHG SURGERY MINUTES - EA ADDL 1 MIN LEVEL 4: Performed by: ORTHOPAEDIC SURGERY

## 2023-01-17 PROCEDURE — 700111 HCHG RX REV CODE 636 W/ 250 OVERRIDE (IP)

## 2023-01-17 PROCEDURE — 01402 ANES OPN/ARTH TOT KNE ARTHRP: CPT | Performed by: STUDENT IN AN ORGANIZED HEALTH CARE EDUCATION/TRAINING PROGRAM

## 2023-01-17 PROCEDURE — 96365 THER/PROPH/DIAG IV INF INIT: CPT | Mod: XU

## 2023-01-17 PROCEDURE — G0378 HOSPITAL OBSERVATION PER HR: HCPCS

## 2023-01-17 PROCEDURE — 64447 NJX AA&/STRD FEMORAL NRV IMG: CPT | Performed by: ORTHOPAEDIC SURGERY

## 2023-01-17 PROCEDURE — 160035 HCHG PACU - 1ST 60 MINS PHASE I: Performed by: ORTHOPAEDIC SURGERY

## 2023-01-17 DEVICE — IMPLANTABLE DEVICE: Type: IMPLANTABLE DEVICE | Site: KNEE | Status: FUNCTIONAL

## 2023-01-17 RX ORDER — SCOLOPAMINE TRANSDERMAL SYSTEM 1 MG/1
1 PATCH, EXTENDED RELEASE TRANSDERMAL
Status: DISCONTINUED | OUTPATIENT
Start: 2023-01-17 | End: 2023-01-18 | Stop reason: HOSPADM

## 2023-01-17 RX ORDER — HYDROMORPHONE HYDROCHLORIDE 1 MG/ML
0.4 INJECTION, SOLUTION INTRAMUSCULAR; INTRAVENOUS; SUBCUTANEOUS
Status: DISCONTINUED | OUTPATIENT
Start: 2023-01-17 | End: 2023-01-17 | Stop reason: HOSPADM

## 2023-01-17 RX ORDER — DIPHENHYDRAMINE HYDROCHLORIDE 50 MG/ML
12.5 INJECTION INTRAMUSCULAR; INTRAVENOUS
Status: DISCONTINUED | OUTPATIENT
Start: 2023-01-17 | End: 2023-01-17 | Stop reason: HOSPADM

## 2023-01-17 RX ORDER — ACETAMINOPHEN 500 MG
1000 TABLET ORAL ONCE
Status: COMPLETED | OUTPATIENT
Start: 2023-01-17 | End: 2023-01-17

## 2023-01-17 RX ORDER — CEFAZOLIN SODIUM 1 G/3ML
2 INJECTION, POWDER, FOR SOLUTION INTRAMUSCULAR; INTRAVENOUS ONCE
Status: COMPLETED | OUTPATIENT
Start: 2023-01-17 | End: 2023-01-17

## 2023-01-17 RX ORDER — OMEPRAZOLE 20 MG/1
20 CAPSULE, DELAYED RELEASE ORAL 2 TIMES DAILY PRN
Status: DISCONTINUED | OUTPATIENT
Start: 2023-01-17 | End: 2023-01-18 | Stop reason: HOSPADM

## 2023-01-17 RX ORDER — EPINEPHRINE 1 MG/ML
INJECTION INTRAMUSCULAR; INTRAVENOUS; SUBCUTANEOUS
Status: DISCONTINUED | OUTPATIENT
Start: 2023-01-17 | End: 2023-01-17 | Stop reason: HOSPADM

## 2023-01-17 RX ORDER — ACETAMINOPHEN 650 MG/1
975 SUPPOSITORY RECTAL EVERY 6 HOURS
Status: DISCONTINUED | OUTPATIENT
Start: 2023-01-17 | End: 2023-01-17

## 2023-01-17 RX ORDER — KETOROLAC TROMETHAMINE 30 MG/ML
INJECTION, SOLUTION INTRAMUSCULAR; INTRAVENOUS
Status: DISCONTINUED | OUTPATIENT
Start: 2023-01-17 | End: 2023-01-17 | Stop reason: HOSPADM

## 2023-01-17 RX ORDER — ALPRAZOLAM 0.5 MG/1
0.5 TABLET ORAL
Status: DISCONTINUED | OUTPATIENT
Start: 2023-01-17 | End: 2023-01-18 | Stop reason: HOSPADM

## 2023-01-17 RX ORDER — SODIUM CHLORIDE, SODIUM LACTATE, POTASSIUM CHLORIDE, CALCIUM CHLORIDE 600; 310; 30; 20 MG/100ML; MG/100ML; MG/100ML; MG/100ML
INJECTION, SOLUTION INTRAVENOUS CONTINUOUS
Status: ACTIVE | OUTPATIENT
Start: 2023-01-17 | End: 2023-01-17

## 2023-01-17 RX ORDER — ONDANSETRON 2 MG/ML
4 INJECTION INTRAMUSCULAR; INTRAVENOUS EVERY 4 HOURS PRN
Status: DISCONTINUED | OUTPATIENT
Start: 2023-01-17 | End: 2023-01-18 | Stop reason: HOSPADM

## 2023-01-17 RX ORDER — HYDROMORPHONE HYDROCHLORIDE 1 MG/ML
0.1 INJECTION, SOLUTION INTRAMUSCULAR; INTRAVENOUS; SUBCUTANEOUS
Status: DISCONTINUED | OUTPATIENT
Start: 2023-01-17 | End: 2023-01-17 | Stop reason: HOSPADM

## 2023-01-17 RX ORDER — AMOXICILLIN 250 MG
1 CAPSULE ORAL NIGHTLY
Status: DISCONTINUED | OUTPATIENT
Start: 2023-01-17 | End: 2023-01-18 | Stop reason: HOSPADM

## 2023-01-17 RX ORDER — IBUPROFEN 400 MG/1
800 TABLET ORAL 3 TIMES DAILY PRN
Status: DISCONTINUED | OUTPATIENT
Start: 2023-01-20 | End: 2023-01-18 | Stop reason: HOSPADM

## 2023-01-17 RX ORDER — VANCOMYCIN HYDROCHLORIDE 1 G/20ML
INJECTION, POWDER, LYOPHILIZED, FOR SOLUTION INTRAVENOUS
Status: COMPLETED | OUTPATIENT
Start: 2023-01-17 | End: 2023-01-17

## 2023-01-17 RX ORDER — MIRTAZAPINE 15 MG/1
7.5 TABLET, ORALLY DISINTEGRATING ORAL NIGHTLY
Status: DISCONTINUED | OUTPATIENT
Start: 2023-01-17 | End: 2023-01-18 | Stop reason: HOSPADM

## 2023-01-17 RX ORDER — TRANEXAMIC ACID 100 MG/ML
INJECTION, SOLUTION INTRAVENOUS PRN
Status: DISCONTINUED | OUTPATIENT
Start: 2023-01-17 | End: 2023-01-17 | Stop reason: SURG

## 2023-01-17 RX ORDER — ACETAMINOPHEN 500 MG
1000 TABLET ORAL EVERY 6 HOURS
Status: DISCONTINUED | OUTPATIENT
Start: 2023-01-17 | End: 2023-01-18 | Stop reason: HOSPADM

## 2023-01-17 RX ORDER — BUPIVACAINE HYDROCHLORIDE 5 MG/ML
INJECTION, SOLUTION EPIDURAL; INTRACAUDAL
Status: COMPLETED | OUTPATIENT
Start: 2023-01-17 | End: 2023-01-17

## 2023-01-17 RX ORDER — POLYETHYLENE GLYCOL 3350 17 G/17G
1 POWDER, FOR SOLUTION ORAL 2 TIMES DAILY PRN
Status: DISCONTINUED | OUTPATIENT
Start: 2023-01-17 | End: 2023-01-18 | Stop reason: HOSPADM

## 2023-01-17 RX ORDER — DIPHENHYDRAMINE HCL 25 MG
25 TABLET ORAL EVERY 6 HOURS PRN
Status: DISCONTINUED | OUTPATIENT
Start: 2023-01-17 | End: 2023-01-18 | Stop reason: HOSPADM

## 2023-01-17 RX ORDER — ONDANSETRON 2 MG/ML
INJECTION INTRAMUSCULAR; INTRAVENOUS PRN
Status: DISCONTINUED | OUTPATIENT
Start: 2023-01-17 | End: 2023-01-17 | Stop reason: SURG

## 2023-01-17 RX ORDER — OXYCODONE HYDROCHLORIDE 5 MG/1
5 TABLET ORAL
Status: DISCONTINUED | OUTPATIENT
Start: 2023-01-17 | End: 2023-01-18 | Stop reason: HOSPADM

## 2023-01-17 RX ORDER — ACETAMINOPHEN 500 MG
1000 TABLET ORAL EVERY 6 HOURS PRN
Status: DISCONTINUED | OUTPATIENT
Start: 2023-01-17 | End: 2023-01-17

## 2023-01-17 RX ORDER — OXYCODONE HCL 5 MG/5 ML
10 SOLUTION, ORAL ORAL
Status: COMPLETED | OUTPATIENT
Start: 2023-01-17 | End: 2023-01-17

## 2023-01-17 RX ORDER — DIPHENHYDRAMINE HYDROCHLORIDE 50 MG/ML
25 INJECTION INTRAMUSCULAR; INTRAVENOUS EVERY 6 HOURS PRN
Status: DISCONTINUED | OUTPATIENT
Start: 2023-01-17 | End: 2023-01-18 | Stop reason: HOSPADM

## 2023-01-17 RX ORDER — ACETAMINOPHEN 650 MG/1
975 SUPPOSITORY RECTAL EVERY 6 HOURS PRN
Status: DISCONTINUED | OUTPATIENT
Start: 2023-01-22 | End: 2023-01-17

## 2023-01-17 RX ORDER — HALOPERIDOL 5 MG/ML
1 INJECTION INTRAMUSCULAR
Status: DISCONTINUED | OUTPATIENT
Start: 2023-01-17 | End: 2023-01-17 | Stop reason: HOSPADM

## 2023-01-17 RX ORDER — KETOROLAC TROMETHAMINE 30 MG/ML
15 INJECTION, SOLUTION INTRAMUSCULAR; INTRAVENOUS EVERY 6 HOURS
Status: DISCONTINUED | OUTPATIENT
Start: 2023-01-17 | End: 2023-01-18 | Stop reason: HOSPADM

## 2023-01-17 RX ORDER — DIPHENHYDRAMINE HCL 25 MG
25 TABLET ORAL NIGHTLY PRN
Status: DISCONTINUED | OUTPATIENT
Start: 2023-01-18 | End: 2023-01-18 | Stop reason: HOSPADM

## 2023-01-17 RX ORDER — HYDROMORPHONE HYDROCHLORIDE 1 MG/ML
0.2 INJECTION, SOLUTION INTRAMUSCULAR; INTRAVENOUS; SUBCUTANEOUS
Status: DISCONTINUED | OUTPATIENT
Start: 2023-01-17 | End: 2023-01-17 | Stop reason: HOSPADM

## 2023-01-17 RX ORDER — ONDANSETRON 2 MG/ML
4 INJECTION INTRAMUSCULAR; INTRAVENOUS
Status: DISCONTINUED | OUTPATIENT
Start: 2023-01-17 | End: 2023-01-17 | Stop reason: HOSPADM

## 2023-01-17 RX ORDER — GABAPENTIN 300 MG/1
300 CAPSULE ORAL ONCE
Status: COMPLETED | OUTPATIENT
Start: 2023-01-17 | End: 2023-01-17

## 2023-01-17 RX ORDER — HYDROMORPHONE HYDROCHLORIDE 1 MG/ML
0.5 INJECTION, SOLUTION INTRAMUSCULAR; INTRAVENOUS; SUBCUTANEOUS
Status: DISCONTINUED | OUTPATIENT
Start: 2023-01-17 | End: 2023-01-18 | Stop reason: HOSPADM

## 2023-01-17 RX ORDER — BISACODYL 10 MG
10 SUPPOSITORY, RECTAL RECTAL
Status: DISCONTINUED | OUTPATIENT
Start: 2023-01-17 | End: 2023-01-18 | Stop reason: HOSPADM

## 2023-01-17 RX ORDER — TRAMADOL HYDROCHLORIDE 50 MG/1
50 TABLET ORAL EVERY 4 HOURS PRN
Status: DISCONTINUED | OUTPATIENT
Start: 2023-01-17 | End: 2023-01-18 | Stop reason: HOSPADM

## 2023-01-17 RX ORDER — ROPIVACAINE HYDROCHLORIDE 5 MG/ML
INJECTION, SOLUTION EPIDURAL; INFILTRATION; PERINEURAL
Status: DISCONTINUED | OUTPATIENT
Start: 2023-01-17 | End: 2023-01-17 | Stop reason: HOSPADM

## 2023-01-17 RX ORDER — ENEMA 19; 7 G/133ML; G/133ML
1 ENEMA RECTAL
Status: DISCONTINUED | OUTPATIENT
Start: 2023-01-17 | End: 2023-01-18 | Stop reason: HOSPADM

## 2023-01-17 RX ORDER — FLUOXETINE HYDROCHLORIDE 20 MG/1
40 CAPSULE ORAL DAILY
Status: DISCONTINUED | OUTPATIENT
Start: 2023-01-17 | End: 2023-01-18 | Stop reason: HOSPADM

## 2023-01-17 RX ORDER — HALOPERIDOL 5 MG/ML
1 INJECTION INTRAMUSCULAR EVERY 6 HOURS PRN
Status: DISCONTINUED | OUTPATIENT
Start: 2023-01-17 | End: 2023-01-18 | Stop reason: HOSPADM

## 2023-01-17 RX ORDER — DOCUSATE SODIUM 100 MG/1
100 CAPSULE, LIQUID FILLED ORAL 2 TIMES DAILY
Status: DISCONTINUED | OUTPATIENT
Start: 2023-01-17 | End: 2023-01-18 | Stop reason: HOSPADM

## 2023-01-17 RX ORDER — OXYCODONE HYDROCHLORIDE 10 MG/1
10 TABLET ORAL
Status: DISCONTINUED | OUTPATIENT
Start: 2023-01-17 | End: 2023-01-18 | Stop reason: HOSPADM

## 2023-01-17 RX ORDER — HYDRALAZINE HYDROCHLORIDE 20 MG/ML
5 INJECTION INTRAMUSCULAR; INTRAVENOUS
Status: DISCONTINUED | OUTPATIENT
Start: 2023-01-17 | End: 2023-01-17 | Stop reason: HOSPADM

## 2023-01-17 RX ORDER — ROSUVASTATIN CALCIUM 10 MG/1
10 TABLET, COATED ORAL EVERY EVENING
Status: DISCONTINUED | OUTPATIENT
Start: 2023-01-17 | End: 2023-01-18 | Stop reason: HOSPADM

## 2023-01-17 RX ORDER — OXYCODONE HCL 5 MG/5 ML
5 SOLUTION, ORAL ORAL
Status: COMPLETED | OUTPATIENT
Start: 2023-01-17 | End: 2023-01-17

## 2023-01-17 RX ORDER — LIDOCAINE HYDROCHLORIDE 20 MG/ML
INJECTION, SOLUTION EPIDURAL; INFILTRATION; INTRACAUDAL; PERINEURAL PRN
Status: DISCONTINUED | OUTPATIENT
Start: 2023-01-17 | End: 2023-01-17 | Stop reason: SURG

## 2023-01-17 RX ORDER — DEXAMETHASONE SODIUM PHOSPHATE 4 MG/ML
INJECTION, SOLUTION INTRA-ARTICULAR; INTRALESIONAL; INTRAMUSCULAR; INTRAVENOUS; SOFT TISSUE PRN
Status: DISCONTINUED | OUTPATIENT
Start: 2023-01-17 | End: 2023-01-17 | Stop reason: SURG

## 2023-01-17 RX ORDER — ACETAMINOPHEN 500 MG
1000 TABLET ORAL EVERY 6 HOURS PRN
Status: DISCONTINUED | OUTPATIENT
Start: 2023-01-17 | End: 2023-01-18 | Stop reason: HOSPADM

## 2023-01-17 RX ORDER — AMOXICILLIN 250 MG
1 CAPSULE ORAL
Status: DISCONTINUED | OUTPATIENT
Start: 2023-01-17 | End: 2023-01-18 | Stop reason: HOSPADM

## 2023-01-17 RX ADMIN — FLUOXETINE 40 MG: 20 CAPSULE ORAL at 14:12

## 2023-01-17 RX ADMIN — ACETAMINOPHEN 1000 MG: 500 TABLET ORAL at 08:46

## 2023-01-17 RX ADMIN — CEFAZOLIN 2 G: 2 INJECTION, POWDER, FOR SOLUTION INTRAMUSCULAR; INTRAVENOUS at 14:11

## 2023-01-17 RX ADMIN — KETOROLAC TROMETHAMINE 15 MG: 30 INJECTION, SOLUTION INTRAMUSCULAR; INTRAVENOUS at 23:19

## 2023-01-17 RX ADMIN — HYDROMORPHONE HYDROCHLORIDE 0.2 MG: 1 INJECTION, SOLUTION INTRAMUSCULAR; INTRAVENOUS; SUBCUTANEOUS at 11:51

## 2023-01-17 RX ADMIN — PROPOFOL 200 MG: 10 INJECTION, EMULSION INTRAVENOUS at 09:54

## 2023-01-17 RX ADMIN — FENTANYL CITRATE 50 MCG: 50 INJECTION, SOLUTION INTRAMUSCULAR; INTRAVENOUS at 11:25

## 2023-01-17 RX ADMIN — DOCUSATE SODIUM 100 MG: 100 CAPSULE, LIQUID FILLED ORAL at 17:58

## 2023-01-17 RX ADMIN — HYDROMORPHONE HYDROCHLORIDE 0.4 MG: 1 INJECTION, SOLUTION INTRAMUSCULAR; INTRAVENOUS; SUBCUTANEOUS at 11:40

## 2023-01-17 RX ADMIN — HYDROMORPHONE HYDROCHLORIDE 0.4 MG: 1 INJECTION, SOLUTION INTRAMUSCULAR; INTRAVENOUS; SUBCUTANEOUS at 11:45

## 2023-01-17 RX ADMIN — LIDOCAINE HYDROCHLORIDE 0.5 ML: 10 INJECTION, SOLUTION EPIDURAL; INFILTRATION; INTRACAUDAL; PERINEURAL at 08:48

## 2023-01-17 RX ADMIN — KETOROLAC TROMETHAMINE 15 MG: 30 INJECTION, SOLUTION INTRAMUSCULAR; INTRAVENOUS at 17:57

## 2023-01-17 RX ADMIN — TRANEXAMIC ACID 1000 MG: 100 INJECTION, SOLUTION INTRAVENOUS at 09:57

## 2023-01-17 RX ADMIN — FENTANYL CITRATE 50 MCG: 50 INJECTION, SOLUTION INTRAMUSCULAR; INTRAVENOUS at 10:09

## 2023-01-17 RX ADMIN — LIDOCAINE HYDROCHLORIDE 100 MG: 20 INJECTION, SOLUTION EPIDURAL; INFILTRATION; INTRACAUDAL at 09:54

## 2023-01-17 RX ADMIN — INSULIN HUMAN 2 UNITS: 100 INJECTION, SOLUTION PARENTERAL at 21:37

## 2023-01-17 RX ADMIN — BUPIVACAINE HYDROCHLORIDE 15 ML: 5 INJECTION, SOLUTION EPIDURAL; INTRACAUDAL; PERINEURAL at 09:21

## 2023-01-17 RX ADMIN — EPHEDRINE SULFATE 5 MG: 50 INJECTION, SOLUTION INTRAVENOUS at 09:54

## 2023-01-17 RX ADMIN — CEFAZOLIN 2 G: 330 INJECTION, POWDER, FOR SOLUTION INTRAMUSCULAR; INTRAVENOUS at 09:54

## 2023-01-17 RX ADMIN — ACETAMINOPHEN 1000 MG: 500 TABLET ORAL at 17:58

## 2023-01-17 RX ADMIN — GABAPENTIN 300 MG: 300 CAPSULE ORAL at 08:46

## 2023-01-17 RX ADMIN — TRANEXAMIC ACID 1000 MG: 100 INJECTION, SOLUTION INTRAVENOUS at 10:37

## 2023-01-17 RX ADMIN — FENTANYL CITRATE 50 MCG: 50 INJECTION, SOLUTION INTRAMUSCULAR; INTRAVENOUS at 11:20

## 2023-01-17 RX ADMIN — DEXAMETHASONE SODIUM PHOSPHATE 4 MG: 4 INJECTION, SOLUTION INTRA-ARTICULAR; INTRALESIONAL; INTRAMUSCULAR; INTRAVENOUS; SOFT TISSUE at 09:57

## 2023-01-17 RX ADMIN — MUPIROCIN 1 APPLICATION: 20 OINTMENT TOPICAL at 17:59

## 2023-01-17 RX ADMIN — METFORMIN HYDROCHLORIDE 500 MG: 500 TABLET ORAL at 17:59

## 2023-01-17 RX ADMIN — ACETAMINOPHEN 1000 MG: 500 TABLET ORAL at 23:19

## 2023-01-17 RX ADMIN — KETOROLAC TROMETHAMINE 15 MG: 30 INJECTION, SOLUTION INTRAMUSCULAR; INTRAVENOUS at 14:11

## 2023-01-17 RX ADMIN — FENTANYL CITRATE 50 MCG: 50 INJECTION, SOLUTION INTRAMUSCULAR; INTRAVENOUS at 10:03

## 2023-01-17 RX ADMIN — SODIUM CHLORIDE, POTASSIUM CHLORIDE, SODIUM LACTATE AND CALCIUM CHLORIDE: 600; 310; 30; 20 INJECTION, SOLUTION INTRAVENOUS at 08:48

## 2023-01-17 RX ADMIN — ASPIRIN 81 MG: 81 TABLET, COATED ORAL at 23:18

## 2023-01-17 RX ADMIN — ONDANSETRON 4 MG: 2 INJECTION INTRAMUSCULAR; INTRAVENOUS at 10:38

## 2023-01-17 RX ADMIN — OXYCODONE HYDROCHLORIDE 10 MG: 5 SOLUTION ORAL at 11:25

## 2023-01-17 RX ADMIN — FENTANYL CITRATE 50 MCG: 50 INJECTION, SOLUTION INTRAMUSCULAR; INTRAVENOUS at 09:49

## 2023-01-17 ASSESSMENT — PATIENT HEALTH QUESTIONNAIRE - PHQ9
1. LITTLE INTEREST OR PLEASURE IN DOING THINGS: NOT AT ALL
SUM OF ALL RESPONSES TO PHQ9 QUESTIONS 1 AND 2: 0
2. FEELING DOWN, DEPRESSED, IRRITABLE, OR HOPELESS: NOT AT ALL
1. LITTLE INTEREST OR PLEASURE IN DOING THINGS: NOT AT ALL
SUM OF ALL RESPONSES TO PHQ9 QUESTIONS 1 AND 2: 0
2. FEELING DOWN, DEPRESSED, IRRITABLE, OR HOPELESS: NOT AT ALL

## 2023-01-17 ASSESSMENT — LIFESTYLE VARIABLES
TOTAL SCORE: 0
AVERAGE NUMBER OF DAYS PER WEEK YOU HAVE A DRINK CONTAINING ALCOHOL: 0
HAVE YOU EVER FELT YOU SHOULD CUT DOWN ON YOUR DRINKING: NO
EVER HAD A DRINK FIRST THING IN THE MORNING TO STEADY YOUR NERVES TO GET RID OF A HANGOVER: NO
TOTAL SCORE: 0
ON A TYPICAL DAY WHEN YOU DRINK ALCOHOL HOW MANY DRINKS DO YOU HAVE: 0
HOW MANY TIMES IN THE PAST YEAR HAVE YOU HAD 5 OR MORE DRINKS IN A DAY: 0
ALCOHOL_USE: NO
TOTAL SCORE: 0
EVER FELT BAD OR GUILTY ABOUT YOUR DRINKING: NO
CONSUMPTION TOTAL: NEGATIVE
HAVE PEOPLE ANNOYED YOU BY CRITICIZING YOUR DRINKING: NO

## 2023-01-17 ASSESSMENT — COGNITIVE AND FUNCTIONAL STATUS - GENERAL
MOBILITY SCORE: 18
MOVING TO AND FROM BED TO CHAIR: A LITTLE
SUGGESTED CMS G CODE MODIFIER MOBILITY: CK
CLIMB 3 TO 5 STEPS WITH RAILING: A LOT
STANDING UP FROM CHAIR USING ARMS: A LITTLE
DAILY ACTIVITIY SCORE: 23
WALKING IN HOSPITAL ROOM: A LITTLE
DRESSING REGULAR LOWER BODY CLOTHING: A LITTLE
SUGGESTED CMS G CODE MODIFIER DAILY ACTIVITY: CI
MOVING FROM LYING ON BACK TO SITTING ON SIDE OF FLAT BED: A LITTLE

## 2023-01-17 ASSESSMENT — FIBROSIS 4 INDEX: FIB4 SCORE: 1.62

## 2023-01-17 ASSESSMENT — PAIN DESCRIPTION - PAIN TYPE
TYPE: SURGICAL PAIN
TYPE: CHRONIC PAIN

## 2023-01-17 NOTE — ANESTHESIA PROCEDURE NOTES
Peripheral Block    Date/Time: 1/17/2023 9:21 AM  Performed by: Tao Chavez D.O.  Authorized by: Tao Chavez D.O.     Patient Location:  Pre-op  Start Time:  1/17/2023 9:21 AM  End Time:  1/17/2023 9:26 AM  Reason for Block: at surgeon's request and post-op pain management ONLY    patient identified, IV checked, site marked, risks and benefits discussed, surgical consent, monitors and equipment checked, pre-op evaluation and timeout performed    Patient Position:  Supine  Prep: ChloraPrep    Monitoring:  Heart rate, continuous pulse ox and cardiac monitor  Block Region:  Lower Extremity  Lower Extremity - Block Type:  Selective FEMORAL nerve block at the Adductor Canal    Laterality:  Right  Procedures: ultrasound guided  Image captured, interpreted and electronically stored.  Local Infiltration:  Lidocaine  Strength:  1 %  Dose:  3 ml  Block Type:  Single-shot  Needle Localization:  Ultrasound guidance  Injection Assessment:  Negative aspiration for heme, no paresthesia on injection, incremental injection and local visualized surrounding nerve on ultrasound  Evidence of intravascular injection: No     US Guided Selective Femoral Nerve Block at Adductor Canal:   US probe placed at mid-thigh level on externally rotated leg and femur identified.  Probe directed medially until Sartorius Muscle (SM), Femoral Artery (FA) and Saphenous Nerve (SN) identified in Adductor Canal (AC).  Needle inserted anterolateral to probe in an in plane approach into a subsartorial perivascular perineural position.  After negative aspiration LA injected with ease and visualized spreading within the AC.

## 2023-01-17 NOTE — ANESTHESIA POSTPROCEDURE EVALUATION
Patient: Gabriella Mckee    Procedure Summary     Date: 01/17/23 Room / Location:  OR 03 / SURGERY [facility]    Anesthesia Start: 0949 Anesthesia Stop: 1103    Procedure: ARTHROPLASTY, KNEE, TOTAL, RIGHT (Right: Knee) Diagnosis:       Primary osteoarthritis of right knee      (Primary osteoarthritis of right knee [M17.11])    Surgeons: Jaden Angeles M.D. Responsible Provider: Tao Chavez D.O.    Anesthesia Type: general, peripheral nerve block ASA Status: 2          Final Anesthesia Type: general, peripheral nerve block  Last vitals  BP   Blood Pressure : 103/45    Temp   36.4 °C (97.5 °F)    Pulse   81   Resp   12    SpO2   94 %      Anesthesia Post Evaluation    Patient location during evaluation: PACU  Patient participation: complete - patient participated  Level of consciousness: awake and alert    Airway patency: patent  Anesthetic complications: no  Cardiovascular status: hemodynamically stable  Respiratory status: acceptable  Hydration status: euvolemic    PONV: none          No notable events documented.     Nurse Pain Score: 0 (NPRS)

## 2023-01-17 NOTE — OR NURSING
1050:  Arrived to PACU. Report received from anesthesia/OR circulator. Patient care assumed. Sleeping, respirations spontaneous and non-labored via OAW.  Ice pack placed on CD&I dressing.     1102: Awake. OAW removed. Good air exchange. Lungs clear.     1120:  Pt rates pain 8/10.  Med per MAR.    1125:  Tolerates sips of water.  States pain is 8/10.  Med per MAR.    1134:  Pt states she has had no relief from pain meds.  Med with Dilaudid    1145:  States pain is 7/10.  Medicated.    1151:  States pain is 5/10.  Medicated.    1215: Patient met criteria for transfer to G[facility] via bed with transport. Oxygen at 350 liters NC/Mask with FULL oxygen tank. Patient states good pain control. Denies any n/v, tolerating po well. Surgical dressing CDI. Pedal pulses Palpable, cap refill <3sec. VSS. A&Ox4. Report called to Jermaine GALLO.

## 2023-01-17 NOTE — PROGRESS NOTES
Patient admitted to Rm 114, wheeled in via gurney. Patient is alert and oriented x4 in no acute respiratory distress. Complains of 3/10 right knee pain, declined intervention at this time. Cold pack in place. CMS intact to right lower extremity. Dressing over right knee clean, dry and intact. Discussed plan of care and understood. Call light placed within reach with instructions. All needs met at this time.

## 2023-01-17 NOTE — H&P
Surgery Orthopedic History & Physical Note    Date  2023    Primary Care Physician  Pcp Pt States None    CC  Pre-Op Diagnosis Codes:     * Primary osteoarthritis of right knee [M17.11]    HPI  This is a 77 y.o. female who presents for a TKA.    Past Medical History:   Diagnosis Date    Anxiety     Arthritis     knees hands spine    Dental disorder 2023    temporary upper teeth    Depression     w/ anxiety    Diabetes (HCC)     oral medication    Gynecological disorder     ovarian cyst    High cholesterol 2023    not taking medication at this time    Pain     knees/ back with activity    Panic attack     Renal disorder 2021    hx kidney stone    Stroke (HCC)        Past Surgical History:   Procedure Laterality Date    COLONOSCOPY  2022    COLONOSCOPY      abdominal fissure removed    BLOCK EPIDURAL STEROID INJECTION      GANGLION EXCISION      R wrist     TUBAL COAGULATION LAPAROSCOPIC BILATERAL         Current Facility-Administered Medications   Medication Dose Route Frequency Provider Last Rate Last Admin    ceFAZolin (ANCEF) injection 2 g  2 g Intravenous Once Jaden Angeles M.D.        lidocaine (XYLOCAINE) 1 % injection 0.5 mL  0.5 mL Intradermal Once PRN Jaden Angeles M.D.   0.5 mL at 23 0848    lactated ringers infusion   Intravenous Continuous Jaden Angeles M.D. 10 mL/hr at 23 0848 New Bag at 23 0848       Social History     Socioeconomic History    Marital status:      Spouse name: Not on file    Number of children: Not on file    Years of education: Not on file    Highest education level: Not on file   Occupational History    Not on file   Tobacco Use    Smoking status: Former     Packs/day: 1.00     Years: 10.00     Pack years: 10.00     Types: Cigarettes     Quit date: 1976     Years since quittin.5    Smokeless tobacco: Never   Vaping Use    Vaping Use: Never used   Substance and Sexual Activity    Alcohol use: Yes     Comment: 1 glass every  week or less    Drug use: No    Sexual activity: Never   Other Topics Concern    Not on file   Social History Narrative    Not on file     Social Determinants of Health     Financial Resource Strain: Not on file   Food Insecurity: Not on file   Transportation Needs: Not on file   Physical Activity: Not on file   Stress: Not on file   Social Connections: Not on file   Intimate Partner Violence: Not on file   Housing Stability: Not on file       Family History   Problem Relation Age of Onset    Alcohol/Drug Mother        Allergies  Patient has no known allergies.    Review of Systems  Negative    Physical Exam  Regular rate and rhythm  Nonlabored breathing  Abdomen soft and nontender   Neurovascular intact distally  Skin is in good condition    Vital Signs  Blood Pressure : (!) 167/71   Temperature: (!) 35.8 °C (96.4 °F)   Pulse: 68   Respiration: 16   Pulse Oximetry: 95 %       Labs:                    Radiology:  No orders to display         Assessment/Plan:  Pre-Op Diagnosis Codes:     * Primary osteoarthritis of right knee [M17.11]  Procedure(s):  ARTHROPLASTY, KNEE, TOTAL, RIGHT

## 2023-01-17 NOTE — OR NURSING
0900   Pt allergies and NPO status verified.  Home medications reconciled.  Belongings secured.  Pt verbalizes understanding of pain scale, expected course of stay, and plan of care.  Surgical site verified with pt.  IV access established.  Triple AIM completed. All questions answered.  Bed in low position.  Call light in reach.

## 2023-01-17 NOTE — ANESTHESIA PROCEDURE NOTES
Airway    Date/Time: 1/17/2023 9:57 AM  Performed by: Tao Chavez D.O.  Authorized by: Tao Chavez D.O.     Location:  OR  Urgency:  Elective  Indications for Airway Management:  Anesthesia      Spontaneous Ventilation: absent    Sedation Level:  Deep  Preoxygenated: Yes    Final Airway Type:  Supraglottic airway  Final Supraglottic Airway:  Standard LMA    SGA Size:  4  Number of Attempts at Approach:  1

## 2023-01-17 NOTE — DISCHARGE INSTRUCTIONS
Patient will be discharged home and follow up with Dr. Angeles in 2 weeks, for which the patient already has scheduled.    You may call or text Dr. Angeles' medical assistant during business hours at [phone].  You may call the emergency AARON line during nights/weekends/holidays if needed at [phone].    Instructions:  -Leave the bandage in place until your followup appointment in 2 weeks.  -May shower with bandage in place, if bandage becomes soaked underneath please remove and call the office immediately.  -No baths/tubs/pools/submersion of the wound for now.  -Call if there is significant drainage beneath the bandage    -Put as much weight as comfortable on the operative leg.  Use a walker to assist with balance to avoid any falls.  -It is important to start moving and stretching right away, otherwise the joint can stiffen.    -Take your blood clot prevention medication for 4 weeks after surgery (aspirin 81mg twice per day).    -Use ice frequently to help with pain and swelling control  -Pain management:  Standard pain regimen should be:        -Tylenol 1,000mg three times per day (take this automatically)        -Meloxicam one pill daily (take this automatically)        -Tramadol 1-2 pills every 6 hours (take this automatically to start)        -Oxycodone 1-2 pills as needed IN ADDITION to the meds listed above.  Do not take Oxycodone and Tramadol at the same time (space at least 1 hour apart)        -Try to limit the amount of oxycodone since it tends to cause constipation, drowsiness, etc.  But if you need it, take it.        -100mg of Colace (docusate) will be prescribed. Take this twice a day while still taking Tramadol or Oxycodone. Can discontinue after opiates are no longer being taken     AGGRESSIVE RANGE OF MOTION.  Do a quad set a million times per day.  Have a family member push down on the thigh and shin every 20 minutes with 2-3 pillows under the heel to help stretch the knee straight.  Flex  the knee as far as possible every 20 minutes.

## 2023-01-17 NOTE — LETTER
October 26, 2022    Patient Name: Gabriella Mckee  Surgeon Name: Jaden Angeels M.D.  Surgery Facility: [facility] ([address] [place] [address])  Surgery Date: 12/20/2022    The time of your surgery is not final and may change up to and until the day of your surgery. You will be contacted 24-48 hours prior to your surgery date with your check-in and surgery time.    If you will not be at one of the below numbers please call the surgery scheduler at [phone]  Preferred Phone: [phone]    BEFORE YOUR SURGERY   COVID testing is not required for non-symptomatic vaccinated patients with proof of vaccination at [facility].  For un-vaccinated patients please refer to the following instructions for your COVID testing requirements:    COVID test required 4-7 days prior to surgery, failure to do so can result in a cancellation.    The following locations offer COVID testing:    Approved facilities for COVID testing, if scheduled at [facility]:  · [facility] from 7:30am-3:30pm at [address] [place] [address]  · [facility] [phone] (Please call for an appointment)  · Your local pharmacy    DAY OF YOUR SURGERY  Nothing to eat or drink after midnight     Refrain from smoking any substance after midnight prior to surgery. Smoking may interfere with the anesthetic and frequently produces nausea during the recovery period.    Continue taking all lifesaving medications. Including the morning of your surgery with small sip of water.    Please do NOT take on the day of surgery:  Diuretics: examples- furosemide (Lasix), spironolactone, hydrochlorothiazide  ACE-inhibitors: examples- lisinopril, ramipril, enalapril  “ARBs”: examples- losartan, Olmesartan, valsartan    Please arrive at the hospital/surgery center at the check-in time provided.     An adult will need to bring you and take you home after your surgery.     AFTER YOUR SURGERY  Post op Appointment:   Date:  01/04/2023   Time: 10:30AM   With: Jaden Angeles M.D.   Location: [address] [place] [id number]    - Therapy- Your first appointment should be 5-7  day(s) after your surgery. For your convenience we have 4 Physical Therapy locations: [place], [facility], [place], and [facility]. Call our office ASAP to schedule an appointment at [phone] or take the enclosed Therapy Prescription to a facility of your choice.  - No dental work for 3-6 months after your surgery.  - Post Surgery - You will need someone to drive you home  - Post Surgery - You will need someone to stay with you for 24 hours  - You must have someone provide transportation post surgery and someone to monitor you for at least 24 hours post-surgery. If you don't have either of these your appointment will be canceled.     TIME OFF WORK  FMLA or Disability forms can be faxed directly to: [phone] or you may drop them off at [address] [place] [phone]. Our office charges a $35.00 fee per form. Forms will be completed within 10 business days of drop off and payment received. For the status of your forms you may contact our disability office directly [phone].    MEDICATION INSTRUCTIONS **Please read section completely**    The following medications should be stopped a minimum of 10 days prior to surgery:  All over the counter, Supplements & Herbal medications    Anorectics: Phentermine (Adipex-P, Lomaira and Suprenza), Phentermine-topiramate (Qsymia), Bupropion-naltrexone (Contrave)    Opiod Partial Agonists/Opioid Antagonists: Buprenorphine (Subocone, Belbuca, Butrans, Probuphine Implant, Sublocade), Naltrexone (ReVia, Vivitrol), Naloxone    Amphetamines: Dextroamphetamine/Amphetamine (Adderall, Mydayis), Methylphenidate Hydrochloride (Concerta, Metadate, Methylin, Ritalin)    The following medications should be stopped 5 days prior to surgery:  Blood Thinners: Any Aspirin, Aspirin products, anti-inflammatories such  as ibuprofen and any blood thinners such as Coumadin and Plavix. Please consult your prescribing physician if you are on life saving blood thinners, in regards to when to stop medications prior to surgery.     The following medications should be stopped a minimum of 3 days prior to surgery:  PDE-5 inhibitors: Sildenafil (Viagra), Tadalafil (Cialis), Vardenafil (Levitra), Avanafil (Stendra)    MAO Inhibitors: Rasagiline (Azilect), Selegiline (Eldepryl, Emsam, Selapar), Isocarboxazid (Marplan), Phenelzine (Nardil)

## 2023-01-17 NOTE — PROGRESS NOTES
Patient wants this nurse to clarify with MD if we could change preparation for Tylenol to oral. Tried to reach MD, left a voicemail and a call back number.

## 2023-01-17 NOTE — LETTER
October 27, 2022    Patient Name: Gabriella Mckee  Surgeon Name: Jaden Angeles M.D.  Sur[facility] [facility]  Surgery Date: 1/17/2023    The time of your surgery is not final and may change up to and until the day of your surgery. You will be contacted 24-48 hours prior to your surgery date with your check-in and surgery time.    If you will not be at one of the below numbers please call the surgery scheduler at [phone]  Preferred Phone: [phone]    BEFORE YOUR SURGERY   Pre Registration and/or Lab Work must be done within and no earlier than 28 days prior to your surgery date. Please call [facility] at [phone] for an appointment as soon as possible.    DAY OF YOUR SURGERY  Nothing to eat or drink after midnight     Refrain from smoking any substance after midnight prior to surgery. Smoking may interfere with the anesthetic and frequently produces nausea during the recovery period.    Continue taking all lifesaving medications. Including the morning of your surgery with small sip of water.    Please do NOT take on the day of surgery:  Diuretics: examples- furosemide (Lasix), spironolactone, hydrochlorothiazide  ACE-inhibitors: examples- lisinopril, ramipril, enalapril  “ARBs”: examples- losartan, Olmesartan, valsartan    Please arrive at the hospital/surgery center at the check-in time provided.     An adult will need to bring you and take you home after your surgery.     AFTER YOUR SURGERY  Post op Appointment:   Date: 02/01/2023   Time: 10:30AM   With: NISHI CAMPA   Location: [address] [place] [address]    - Therapy- Your first appointment should be 5-7  day(s) after your surgery. For your convenience we have 4 Physical Therapy locations: [facility], [place], and [facility]. Call our office ASAP to schedule an appointment at [phone] or take the enclosed Therapy Prescription to a  facility of your choice.  - No dental work for 3-6 months after your surgery.  - Post Surgery - You will need someone to drive you home  - Post Surgery - You will need someone to stay with you for 24 hours  - You must have someone provide transportation post surgery and someone to monitor you for at least 24 hours post-surgery. If you don't have either of these your appointment will be canceled.     TIME OFF WORK  FMLA or Disability forms can be faxed directly to: [phone] or you may drop them off at [address] [place] [address]. Our office charges a $35.00 fee per form. Forms will be completed within 10 business days of drop off and payment received. For the status of your forms you may contact our disability office directly [phone].    MEDICATION INSTRUCTIONS **Please read section completely**    The following medications should be stopped a minimum of 10 days prior to surgery:  All over the counter, Supplements & Herbal medications    Anorectics: Phentermine (Adipex-P, Lomaira and Suprenza), Phentermine-topiramate (Qsymia), Bupropion-naltrexone (Contrave)    Opiod Partial Agonists/Opioid Antagonists: Buprenorphine (Subocone, Belbuca, Butrans, Probuphine Implant, Sublocade), Naltrexone (ReVia, Vivitrol), Naloxone    Amphetamines: Dextroamphetamine/Amphetamine (Adderall, Mydayis), Methylphenidate Hydrochloride (Concerta, Metadate, Methylin, Ritalin)    The following medications should be stopped 5 days prior to surgery:  Blood Thinners: Any Aspirin, Aspirin products, anti-inflammatories such as ibuprofen and any blood thinners such as Coumadin and Plavix. Please consult your prescribing physician if you are on life saving blood thinners, in regards to when to stop medications prior to surgery.     The following medications should be stopped a minimum of 3 days prior to surgery:  PDE-5 inhibitors: Sildenafil (Viagra), Tadalafil (Cialis), Vardenafil (Levitra), Avanafil (Stendra)    MAO  Inhibitors: Rasagiline (Azilect), Selegiline (Eldepryl, Emsam, Selapar), Isocarboxazid (Marplan), Phenelzine (Nardil)

## 2023-01-17 NOTE — ANESTHESIA PREPROCEDURE EVALUATION
Case: [id number] Date/Time: 01/17/23 0945    Procedure: ARTHROPLASTY, KNEE, TOTAL, RIGHT (Right)    Diagnosis: Primary osteoarthritis of right knee [M17.11]    Pre-op diagnosis: Primary osteoarthritis of right knee [M17.11]    Location:  OR 03 / SURGERY [facility]    Surgeons: Jaden Angeles M.D.          Relevant Problems      (positive) Kidney stone       Physical Exam    Airway   Mallampati: II  TM distance: >3 FB  Neck ROM: full       Cardiovascular - normal exam  Rhythm: regular  Rate: normal  (-) murmur     Dental - normal exam           Pulmonary - normal exam  Breath sounds clear to auscultation     Abdominal    Neurological - normal exam                 Anesthesia Plan    ASA 2       Plan - general and peripheral nerve block     Peripheral nerve block will be post-op pain control  Airway plan will be LMA          Induction: intravenous    Postoperative Plan: Postoperative administration of opioids is intended.    Pertinent diagnostic labs and testing reviewed    Informed Consent:    Anesthetic plan and risks discussed with patient.    Use of blood products discussed with: patient whom consented to blood products.

## 2023-01-17 NOTE — OP REPORT
Preop Diagnosis: Advanced right knee arthritis  Postop Diagnosis:  Same  Procedure: Right total knee arthroplasty  Surgeon: Dr. Jaden Angeles MD  Assistant: Gabino Nunes PA-C  Anesthesia: Dr. Chavez. General + Adductor canal block  Estimated Blood Loss: 50cc  Drains: None  Complications: None    Implants: [employer] CR Cementless, size 4 femur, size 4 tibia, with a 10 mm CS insert and 35 oval patella.    Indications: Gabriella Mckee is a 77 y.o. female who has had severe progressive knee pain that failed conservative management.  There were severe degenerative changes on radiographs.  We discussed the options and she was ultimately indicated for knee replacement.  We discussed the risk of bleeding, transfusion, pain, neurovascular injury, stiffness, fracture, infection, wound complication, loosening of the parts over time, blood clots, and medical complication.  No guarantees were made and she elected to proceed.    Pertinent Findings and Releases: There were severe degenerative changes.  At the end of the case, the knee easily came to full extension and flexed up to calf/thigh impingement with the arthrotomy closed.  The patella tracked centrally.    Informed consent and site marking were confirmed in preop.  An adductor canal block had been performed by the anesthesiologist, and then she was brought back to the OR where anesthesia was performed. Supine positioning was perfmored with all bony prominences well padded with a padded tourniquet on the thigh.  The extremity was prepped and draped in the usual sterile fashion. I performed a pre-procedure timeout to confirm we had the correct patient, side, site, procedure, and presence of necessary personal and equipment.  I confirmed that Ancef and tranexamic acid were given.  The tourniquet was then inflated.    A midline incision was made medial to the tibial tubercle centered over patella taken down to the deep capsule of the knee. A medial parapatellar  arthrotomy was performed.  The fat pad was released. The patella was subluxated and the knee was flexed. The remnants of the anterior horn of the medial and lateral meniscus were removed as well as the ACL from the intercondylar notch. All distal protruding osteophytes were removed. I then drilled and accessed the intramedullary canal of the femur, lavaged it and placed the intramedullary cutting guide. The distal femur was cut in 6 degrees of valgus. I then sized the femur and based rotation off of bony landmarks.  All distal femoral cuts were made.  The tibia was then subluxated forward and cut perpendicular to its long axis.  A spacer block was placed in the knee extension to confirm I had resected enough bone and then sequential releases were performed until there was a rectangular gap.  Collaterals were intact and overall limb alignment was checked and appropriate.  The knee was then tensed at 90 degrees and the meniscal remnants and posterior osteophytes were removed.  The posterior capsule was injected with a local anesthetic cocktail.  The tibia was then subluxed forward, sized, and punched in the appropriate amount of external rotation and mechanical alignment was verified using a drop otilio. Trials were placed, the knee was reduced with a trial insert, it was taken through a range of motion, and found to be stable in the varus/valgus plane 0-30 degrees of flexion and the AP plane at 90 degrees of flexion. Attention was then turned to the patella. A symmetric resection was performed to recreate native patella height and appropriately sized. All extraneous osteophyte and synovium were removed.  With a trial in place, the patella tracked centrally using the no thumbs technique. All trial components were removed. The real components were impacted with a great press-fit.  The tourniquet was let down and hemostasis ensured. The real insert was placed. Stability and patellar tracking were excellent. The knee was  then copiously irrigated. One gram of Vancomycin was left in the wound.  The arthrotomy was closed with number 2 running barbed suture, and the wound was closed in layers. An occlusive silver dressing was applied and the patient was turned over to anesthesia in stable condition without any apparent intraoperative complications.    Plan:  WBAT, PT/OT evaluation, Aspirin 81mg BID for 4 weeks for DVT prophylaxis, Leave dressing in place until followup.

## 2023-01-17 NOTE — ANESTHESIA TIME REPORT
Anesthesia Start and Stop Event Times     Date Time Event    1/17/2023 0940 Ready for Procedure     0949 Anesthesia Start     1103 Anesthesia Stop        Responsible Staff  01/17/23    Name Role Begin End    Tao Chavez D.O. Anesth 0949 1103        Overtime Reason:  no overtime (within assigned shift)    Comments:

## 2023-01-18 VITALS
OXYGEN SATURATION: 92 % | WEIGHT: 178.68 LBS | HEART RATE: 69 BPM | TEMPERATURE: 99.1 F | HEIGHT: 69 IN | SYSTOLIC BLOOD PRESSURE: 137 MMHG | RESPIRATION RATE: 18 BRPM | BODY MASS INDEX: 26.47 KG/M2 | DIASTOLIC BLOOD PRESSURE: 67 MMHG

## 2023-01-18 LAB
GLUCOSE BLD STRIP.AUTO-MCNC: 129 MG/DL (ref 65–99)
GLUCOSE BLD STRIP.AUTO-MCNC: 131 MG/DL (ref 65–99)

## 2023-01-18 PROCEDURE — 700111 HCHG RX REV CODE 636 W/ 250 OVERRIDE (IP): Performed by: ORTHOPAEDIC SURGERY

## 2023-01-18 PROCEDURE — 97162 PT EVAL MOD COMPLEX 30 MIN: CPT

## 2023-01-18 PROCEDURE — 99024 POSTOP FOLLOW-UP VISIT: CPT | Performed by: ORTHOPAEDIC SURGERY

## 2023-01-18 PROCEDURE — 700102 HCHG RX REV CODE 250 W/ 637 OVERRIDE(OP): Performed by: ORTHOPAEDIC SURGERY

## 2023-01-18 PROCEDURE — 97165 OT EVAL LOW COMPLEX 30 MIN: CPT

## 2023-01-18 PROCEDURE — A9270 NON-COVERED ITEM OR SERVICE: HCPCS | Performed by: ORTHOPAEDIC SURGERY

## 2023-01-18 PROCEDURE — 96376 TX/PRO/DX INJ SAME DRUG ADON: CPT | Mod: XU

## 2023-01-18 PROCEDURE — 82962 GLUCOSE BLOOD TEST: CPT | Mod: 91

## 2023-01-18 PROCEDURE — G0378 HOSPITAL OBSERVATION PER HR: HCPCS

## 2023-01-18 PROCEDURE — 97535 SELF CARE MNGMENT TRAINING: CPT

## 2023-01-18 RX ADMIN — FLUOXETINE 40 MG: 20 CAPSULE ORAL at 05:02

## 2023-01-18 RX ADMIN — DOCUSATE SODIUM 100 MG: 100 CAPSULE, LIQUID FILLED ORAL at 05:02

## 2023-01-18 RX ADMIN — ASPIRIN 81 MG: 81 TABLET, COATED ORAL at 05:02

## 2023-01-18 RX ADMIN — ACETAMINOPHEN 1000 MG: 500 TABLET ORAL at 05:02

## 2023-01-18 RX ADMIN — MUPIROCIN 1 APPLICATION: 20 OINTMENT TOPICAL at 04:58

## 2023-01-18 RX ADMIN — METFORMIN HYDROCHLORIDE 500 MG: 500 TABLET ORAL at 07:39

## 2023-01-18 RX ADMIN — ACETAMINOPHEN 1000 MG: 500 TABLET ORAL at 11:34

## 2023-01-18 RX ADMIN — KETOROLAC TROMETHAMINE 15 MG: 30 INJECTION, SOLUTION INTRAMUSCULAR; INTRAVENOUS at 04:59

## 2023-01-18 RX ADMIN — KETOROLAC TROMETHAMINE 15 MG: 30 INJECTION, SOLUTION INTRAMUSCULAR; INTRAVENOUS at 11:33

## 2023-01-18 ASSESSMENT — COGNITIVE AND FUNCTIONAL STATUS - GENERAL
TOILETING: A LITTLE
PERSONAL GROOMING: A LITTLE
DAILY ACTIVITIY SCORE: 20
MOBILITY SCORE: 24
HELP NEEDED FOR BATHING: A LITTLE
SUGGESTED CMS G CODE MODIFIER MOBILITY: CH
DRESSING REGULAR LOWER BODY CLOTHING: A LITTLE
SUGGESTED CMS G CODE MODIFIER DAILY ACTIVITY: CJ

## 2023-01-18 ASSESSMENT — GAIT ASSESSMENTS
DISTANCE (FEET): 20
ASSISTIVE DEVICE: FRONT WHEEL WALKER
GAIT LEVEL OF ASSIST: SUPERVISED
DISTANCE (FEET): 150
DEVIATION: STEP TO

## 2023-01-18 ASSESSMENT — ACTIVITIES OF DAILY LIVING (ADL): TOILETING: INDEPENDENT

## 2023-01-18 ASSESSMENT — PAIN DESCRIPTION - PAIN TYPE
TYPE: SURGICAL PAIN

## 2023-01-18 NOTE — PROGRESS NOTES
Received report from night RN. Patient alert and oriented. Denies any complains at this time. Dressing to right knee clean. Dry and intact. Ice pack to right knee. Discussed plan of care and understood. Assessment per [facility]. Falls precaution in place. Call light placed within reach.

## 2023-01-18 NOTE — THERAPY
Physical Therapy   Initial Evaluation     Patient Name: Gabreilla Mckee  Age:  77 y.o., Sex:  female  Medical Record #: [id number]  Today's Date: 1/18/2023     Precautions  Precautions: (P) Weight Bearing As Tolerated Right Lower Extremity    Assessment  Patient is 77 y.o. female with a diagnosis of R TKR PT lives at home alone and is active.Pt is safe with bed mob,transfers ,ambulation and stairs.She understands HEP and needs a FWW for home     Plan  DC Equipment Recommendations: (P) Front-Wheel Walker  Discharge Recommendations: (P) Recommend outpatient physical therapy services to address higher level deficits     Objective       01/18/23 1000   Charge Group   PT Evaluation PT Evaluation Mod   Total Time Spent   PT Total Time Yes   PT Evaluation Time Spent (Mins) 45   Initial Contact Note    Initial Contact Note Order Received and Verified, Physical Therapy Evaluation in Progress with Full Report to Follow.   Precautions   Precautions Weight Bearing As Tolerated Right Lower Extremity   Prior Living Situation   Prior Services None   Housing / Facility 1 Story House   Steps Into Home 1   Steps In Home 0   Equipment Owned None   Lives with - Patient's Self Care Capacity Alone and Able to Care For Self   Prior Level of Functional Mobility   Bed Mobility Independent   Transfer Status Independent   Ambulation Independent   Distance Ambulation (Feet)   (community amb)   Assistive Devices Used None   Stairs Independent   History of Falls   History of Falls No   Cognition    Cognition / Consciousness WDL   Level of Consciousness Alert   Passive ROM Lower Body   Passive ROM Lower Body X   Rt Knee Flexion Degrees 95   Rt Knee Extension Degrees 0   Active ROM Lower Body    Active ROM Lower Body  X   Strength Lower Body   Lower Body Strength  X   Sensation Lower Body   Lower Extremity Sensation   WDL   Coordination Lower Body    Coordination Lower Body  WDL   Balance Assessment   Sitting Balance (Static) Good   Sitting  Balance (Dynamic) Good   Standing Balance (Static) Good   Standing Balance (Dynamic) Good   Weight Shift Sitting Good   Weight Shift Standing Good   Bed Mobility    Supine to Sit Modified Independent   Sit to Supine Modified Independent   Scooting Modified Independent   Gait Analysis   Gait Level Of Assist Supervised   Assistive Device Front Wheel Walker   Distance (Feet) 150   # of Times Distance was Traveled 1   Deviation Step To   # of Stairs Climbed 1   Level of Assist with Stairs Standby Assist   Weight Bearing Status wbat R   Functional Mobility   Sit to Stand Supervised   Bed, Chair, Wheelchair Transfer Supervised   Transfer Method Stand Step   How much difficulty does the patient currently have...   Turning over in bed (including adjusting bedclothes, sheets and blankets)? 4   Sitting down on and standing up from a chair with arms (e.g., wheelchair, bedside commode, etc.) 4   Moving from lying on back to sitting on the side of the bed? 4   How much help from another person does the patient currently need...   Moving to and from a bed to a chair (including a wheelchair)? 4   Need to walk in a hospital room? 4   Climbing 3-5 steps with a railing? 4   6 clicks Mobility Score 24   Activity Tolerance   Sitting Edge of Bed 10   Standing 10   Patient / Family Goals    Patient / Family Goal #1 Home   Anticipated Discharge Equipment and Recommendations   DC Equipment Recommendations Front-Wheel Walker   Discharge Recommendations Recommend outpatient physical therapy services to address higher level deficits   Interdisciplinary Plan of Care Collaboration   IDT Collaboration with  Nursing   Session Information   Date / Session Number  1/18   Priority 0

## 2023-01-18 NOTE — DISCHARGE PLANNING
Received Choice form at 1012  Agency/Facility Name: [facility]  Referral sent per Choice form @ 1022

## 2023-01-18 NOTE — CARE PLAN
The patient is Stable - Low risk of patient condition declining or worsening    Shift Goals  Clinical Goals: void, walk, wean to room air  Patient Goals: sleep at least 8 hours    Progress made toward(s) clinical / shift goals:  Pt voided with adequate output and ambulated up to the bathroom during this shift. Pt tolerated her pain well during this shift, scheduled Tylenol and Toradol given. Pt was seen sleeping in between rounds. Hourly rounding in progress.    Patient is not progressing towards the following goals: N/A

## 2023-01-18 NOTE — DISCHARGE SUMMARY
Patient was admitted for a total knee arthroplasty.  There were no complications during the surgery. Did well post-operatively.               Active Hospital Problems    Diagnosis     Total knee replacement status, right [Z96.651]     Osteoarthritis, knee [M17.9]      Added automatically from request for surgery [id number]         Uneventful hospital course.     Medication List        CONTINUE taking these medications        Instructions   * acetaminophen 500 MG Tabs  Commonly known as: TYLENOL   Take 500-1,000 mg by mouth every 6 hours as needed.  Dose: 500-1,000 mg     * acetaminophen 500 MG Tabs  Commonly known as: TYLENOL   Take 2 Tablets by mouth 3 times a day as needed for Mild Pain or Moderate Pain for up to 30 days.  Dose: 1,000 mg     Alcohol Swabs   Doctor's comments: Per formulary preference. ICD-10 code: E11.65 Uncontrolled type 2 Diabetes Mellitus  Wipe site with prep pad prior to injection.     ALPRAZolam 0.5 MG Tabs  Commonly known as: XANAX   Take 0.5 mg by mouth 1 time a day as needed for Anxiety.  Dose: 0.5 mg     aspirin 81 MG Chew chewable tablet  Commonly known as: ASA   Chew 1 Tablet 2 times a day for 28 days.  Dose: 81 mg     * Blood Glucose Test Strips   Doctor's comments: Or per formulary preference. ICD-10 code: E11.65 Uncontrolled type 2 Diabetes Mellitus  Use one One Touch Verio strip or insurance preference to test blood sugar twice daily.     * Blood Glucose Meter Kit   Doctor's comments: Or per formulary preference. ICD-10 code: E11.65 Uncontrolled type 2 Diabetes Mellitus  Test blood sugar as recommended by provider. One Touch Verio or insurance preference blood glucose monitoring kit.     fluoxetine 40 MG capsule  Commonly known as: PROZAC   Take 40 mg by mouth every day.  Dose: 40 mg     Lancets   Doctor's comments: Or per formulary preference. ICD-10 code: E11.65 Uncontrolled type 2 Diabetes Mellitus  Use one One Touch Verio lancet or insurance preference to test blood sugar twice  daily.     meloxicam 7.5 MG Tabs  Commonly known as: MOBIC   Take 1 Tablet by mouth every day for 60 days.  Dose: 7.5 mg     metFORMIN 500 MG Tabs  Commonly known as: GLUCOPHAGE   Take 500 mg by mouth every day.  Dose: 500 mg     mirtazapine 15 MG Tabs  Commonly known as: Remeron   Take 15 mg by mouth every evening.  Dose: 15 mg     mupirocin 2 % Oint  Commonly known as: BACTROBAN   Apply 1 Application topically 2 times a day for 5 days.  Dose: 1 Application     oxyCODONE immediate-release 5 MG Tabs  Commonly known as: ROXICODONE   Take 1-2 Tablets by mouth every four hours as needed for Severe Pain for up to 7 days.  Dose: 5-10 mg     PRECISION XTRA TEST STRIPS strip  Generic drug: glucose blood   1 Strip by Extracorporeal route.  Dose: 1 Strip     rosuvastatin 10 MG Tabs  Commonly known as: CRESTOR   Take 1 Tablet by mouth every evening.  Dose: 10 mg     traMADol 50 MG Tabs  Commonly known as: Ultram   Take 1-2 Tablets by mouth every 6 hours as needed for Moderate Pain for up to 7 days.  Dose:  mg           * This list has 4 medication(s) that are the same as other medications prescribed for you. Read the directions carefully, and ask your doctor or other care provider to review them with you.

## 2023-01-18 NOTE — PROGRESS NOTES
"Subjective:    No acute events.  Pain reasonably controlled.  Mobilizing well.    Objective:  /64   Pulse 70   Temp 36.9 °C (98.4 °F) (Temporal)   Resp 18   Ht 1.753 m (5' 9\")   Wt 81.1 kg (178 lb 10.9 oz)   SpO2 93%                 Intake/Output Summary (Last 24 hours) at 1/18/2023 0710  Last data filed at 1/18/2023 0600  Gross per 24 hour   Intake 2070 ml   Output 1350 ml   Net 720 ml       Comfortable, no distress  Neurologically and vascularly intact with palpable pedal pulses bilaterally.  Dressing C/D/I      Assessment:  Doing well s/p total knee arthroplasty.    Plan:    Diet as tolerated  WBAT  PT/OT  Reviewed knee ROM including terminal flexion and extension exercises  DVT ppx - ASA BID + Sequential Compression Devices  Plan to discharge home  Follow-up in approximately 2 weeks post-op.  [phone]    "

## 2023-01-18 NOTE — THERAPY
"Occupational Therapy   Initial Evaluation     Patient Name: Gabriella Mckee  Age:  77 y.o., Sex:  female  Medical Record #: [id number]  Today's Date: 1/18/2023     Precautions  Precautions: Weight Bearing As Tolerated Right Lower Extremity    Assessment  Patient is 77 y.o. female admit for R TKA.  [place] so [place] time needed to assess her needs and provide education and ADL and IADL strategies.  She was in more pain with OT than she was during PT so her balance was mildly impaired but still adequate with FWW.  She was able to tolerate dressing and toileting, but would benefit from a reacher for home.  She will be taking home single patient use Raised toilet seat that she has been using while here, and FWW has been issued already.  Pt appears she will manage on her own at home as long as she keeps good control of her pain.  No further OT needs in this setting at this time.     Treatment completed this session after initial evaluation completed:  Educated pt on role of OT and Total Knee Replacement Protocol.  Reviewed application of precautions and use of Adaptive Equipment for dressing, bathing, toileting, grooming, (as needed) kitchen activities and general functional mobility in the home.  Reviewed the use of reacher,  shower chair and raised toilet seat to assist with ADL's.  Reviewed stall shower transfers, but encouraged pt to sponge bathe until able to tolerate standing well in shower since she does not have a HH shower head. Provided pt with suggestions on where to obtain needed items. Educated on mgmt of aquacel and ace wrap post op bandage with dressing and showering.  Pt verbalized understanding of all education provided.      Plan    DC Equipment Recommendations: Front-Wheel Walker (reacher)  Discharge Recommendations: Anticipate that the patient will have no further occupational therapy needs after discharge from the hospital     Subjective    \"Wow, I can't believe how much more " "this hurts now.\"     Objective       01/18/23 1211   Prior Living Situation   Prior Services None   Housing / Facility 1 Story House   Steps Into Home 1   Steps In Home 0   Bathroom Set up Walk In Shower;Built-In Shower Chair;Grab Bars   Equipment Owned Tub / Shower Seat;Grab Bar(s) In Tub / Shower  (Pt required Raised toilet seat for safety with transfers while in the hospital- it will be sent home with pt as it is a single use item)   Lives with - Patient's Self Care Capacity Alone and Able to Care For Self   [place] to neighbors or community resources   Prior Level of ADL Function   Self Feeding Independent   Grooming / Hygiene Independent   Bathing Independent   Dressing Independent   Toileting Independent   Prior Level of IADL Function   Medication Management Independent   Laundry Independent   Kitchen Mobility Independent   Finances Independent   Home Management Independent   Shopping Independent   Prior Level Of Mobility Independent Without Device in Community   Driving / Transportation Driving Independent   Occupation (Pre-Hospital Vocational) Retired Due To Age   Leisure Interests Unable To Determine At This Time   Precautions   Precautions Weight Bearing As Tolerated Right Lower Extremity   Vitals   O2 Delivery Device None - Room Air   Pain 0 - 10 Group   Location Knee   Location Orientation Right   Description Aching   Therapist Pain Assessment 8;Prior to Activity;During Activity;6;Post Activity;Nurse Notified  (Rn administered meds at start of therapy)   Cognition    Cognition / Consciousness WDL   Level of Consciousness Alert   Comments shaky and distracted by pain initially, better able to focus at end of session   Active ROM Upper Body   Active ROM Upper Body  WDL   Dominant Hand Right   Strength Upper Body   Upper Body Strength  WDL   Balance Assessment   Sitting Balance (Static) Good   Sitting Balance (Dynamic) Good   Standing Balance (Static) " Fair +   Standing Balance (Dynamic) Fair   Weight Shift Sitting Good   Weight Shift Standing Fair   Comments with FWW, balance decreased when pain was worse   Bed Mobility    Supine to Sit Modified Independent   Sit to Supine Modified Independent   Scooting Modified Independent   ADL Assessment   Grooming Supervision;Seated   Upper Body Dressing Modified Independent   Lower Body Dressing Minimal Assist  (shoes and socks, able to don pants and undies with SBA)   Toileting Standby Assist  (needed RTS, ordered one for use here and then to take home)   How much help from another person does the patient currently need...   Putting on and taking off regular lower body clothing? 3   Bathing (including washing, rinsing, and drying)? 3   Toileting, which includes using a toilet, bedpan, or urinal? 3   Putting on and taking off regular upper body clothing? 4   Taking care of personal grooming such as brushing teeth? 3   Eating meals? 4   6 Clicks Daily Activity Score 20   Functional Mobility   Sit to Stand Supervised   Bed, Chair, Wheelchair Transfer Supervised   Toilet Transfers Supervised  (with RTS)   Transfer Method Stand Step   Mobility SBA with FWW to BR and back to EOB   Distance (Feet) 20   # of Times Distance was Traveled 2   Edema / Skin Assessment   Comments aquacel intact   Activity Tolerance   Sitting Edge of Bed 15 min, 5 min   Standing 5 min, 2 min x2   Patient / Family Goals   Patient / Family Goal #1 home   Education Group   Education Provided Role of Occupational Therapist;Activities of Daily Living   Role of Occupational Therapist Patient Response Patient;Acceptance;Explanation;Verbal Demonstration   ADL Patient Response Patient;Acceptance;Explanation;Demonstration;Verbal Demonstration   Additional Comments Extensive education see notes section

## 2023-01-18 NOTE — CARE PLAN
The patient is Stable - Low risk of patient condition declining or worsening    Shift Goals  Clinical Goals: Ambulate 50ft during this shift; Void; Pain control 3/10 or lower  Patient Goals: Rest; Comfort    Progress made toward(s) clinical / shift goals:  Able to ambulate more than 50 ft during this shift; Able to void. Pain controlled 3/10 or lower.    Patient is not progressing towards the following goals:

## 2023-01-18 NOTE — DISCHARGE PLANNING
Case Management Discharge Planning    Admission Date: 1/17/2023  GMLOS:    ALOS: 0    6-Clicks ADL Score: 23  6-Clicks Mobility Score: 18      Anticipated Discharge Dispo:      DME Needed: Yes    DME Ordered: Yes    Action(s) Taken: Updated Provider/Nurse on Discharge Plan    1012: Choice for [facility] faxed to [facility].     Escalations Completed: None    Medically Clear: No    Next Steps: Medical clearance    Barriers to Discharge: Medical clearance

## 2023-01-18 NOTE — PROGRESS NOTES
Received report from day shift nurse. Assumed pt care at 1915. Pt is A&Ox4, resting comfortably in bed. Pt on 1 lpm via nasal cannula No signs of SOB/respiratory distress. Labs noted, VSS. Pt c/o no pain at this moment. Fall precautions in place. Post op dressing dry and intact Bed at lowest position. Call light and personal belongings within reach. Continue to monitor

## 2023-01-18 NOTE — PROGRESS NOTES
4 Eyes Skin Assessment Completed by Gabriel RN and CLEO Eaton.    Head WDL  Ears WDL  Nose WDL  Mouth WDL  Neck WDL  Breast/Chest WDL  Shoulder Blades WDL  Spine WDL  (R) Arm/Elbow/Hand WDL  (L) Arm/Elbow/Hand WDL  Abdomen WDL  Groin WDL  Scrotum/Coccyx/Buttocks WDL  (R) Leg Incision  (L) Leg WDL  (R) Heel/Foot/Toe WDL  (L) Heel/Foot/Toe WDL          Devices In Places Blood Pressure Cuff, Pulse Ox, and SCD's      Interventions In Place Pillows    Possible Skin Injury No    Pictures Uploaded Into Epic N/A  Wound Consult Placed N/A  RN Wound Prevention Protocol Ordered No

## 2023-03-09 PROBLEM — M79.672 LEFT FOOT PAIN: Status: ACTIVE | Noted: 2023-03-09

## 2023-05-11 ENCOUNTER — HOSPITAL ENCOUNTER (OUTPATIENT)
Facility: MEDICAL CENTER | Age: 78
End: 2023-05-11
Attending: NURSE PRACTITIONER
Payer: MEDICARE

## 2023-05-11 ENCOUNTER — OFFICE VISIT (OUTPATIENT)
Dept: MEDICAL GROUP | Facility: PHYSICIAN GROUP | Age: 78
End: 2023-05-11
Payer: MEDICARE

## 2023-05-11 VITALS
HEART RATE: 91 BPM | WEIGHT: 171.4 LBS | DIASTOLIC BLOOD PRESSURE: 78 MMHG | OXYGEN SATURATION: 94 % | RESPIRATION RATE: 14 BRPM | HEIGHT: 69 IN | BODY MASS INDEX: 25.39 KG/M2 | SYSTOLIC BLOOD PRESSURE: 136 MMHG | TEMPERATURE: 98.8 F

## 2023-05-11 DIAGNOSIS — M79.672 LEFT FOOT PAIN: ICD-10-CM

## 2023-05-11 DIAGNOSIS — E11.65 UNCONTROLLED TYPE 2 DIABETES MELLITUS WITH HYPERGLYCEMIA (HCC): ICD-10-CM

## 2023-05-11 DIAGNOSIS — G47.00 INSOMNIA, UNSPECIFIED TYPE: ICD-10-CM

## 2023-05-11 DIAGNOSIS — M85.89 OSTEOPENIA OF MULTIPLE SITES: ICD-10-CM

## 2023-05-11 DIAGNOSIS — N39.3 STRESS INCONTINENCE: ICD-10-CM

## 2023-05-11 DIAGNOSIS — F33.1 MODERATE EPISODE OF RECURRENT MAJOR DEPRESSIVE DISORDER (HCC): ICD-10-CM

## 2023-05-11 DIAGNOSIS — Z96.651 TOTAL KNEE REPLACEMENT STATUS, RIGHT: ICD-10-CM

## 2023-05-11 DIAGNOSIS — E78.00 ELEVATED LDL CHOLESTEROL LEVEL: ICD-10-CM

## 2023-05-11 DIAGNOSIS — M54.32 SCIATICA OF LEFT SIDE: ICD-10-CM

## 2023-05-11 DIAGNOSIS — R25.1 TREMOR: ICD-10-CM

## 2023-05-11 DIAGNOSIS — R03.0 ELEVATED BLOOD PRESSURE READING: ICD-10-CM

## 2023-05-11 DIAGNOSIS — M25.552 PAIN OF BOTH HIP JOINTS: ICD-10-CM

## 2023-05-11 DIAGNOSIS — M25.551 PAIN OF BOTH HIP JOINTS: ICD-10-CM

## 2023-05-11 DIAGNOSIS — F41.0 PANIC ATTACKS: ICD-10-CM

## 2023-05-11 DIAGNOSIS — Z87.891 FORMER SMOKER: ICD-10-CM

## 2023-05-11 DIAGNOSIS — M48.061 SPINAL STENOSIS OF LUMBAR REGION, UNSPECIFIED WHETHER NEUROGENIC CLAUDICATION PRESENT: ICD-10-CM

## 2023-05-11 PROCEDURE — 3075F SYST BP GE 130 - 139MM HG: CPT | Performed by: NURSE PRACTITIONER

## 2023-05-11 PROCEDURE — 1125F AMNT PAIN NOTED PAIN PRSNT: CPT | Performed by: NURSE PRACTITIONER

## 2023-05-11 PROCEDURE — 3078F DIAST BP <80 MM HG: CPT | Performed by: NURSE PRACTITIONER

## 2023-05-11 PROCEDURE — 82043 UR ALBUMIN QUANTITATIVE: CPT

## 2023-05-11 PROCEDURE — 99214 OFFICE O/P EST MOD 30 MIN: CPT | Performed by: NURSE PRACTITIONER

## 2023-05-11 PROCEDURE — 82570 ASSAY OF URINE CREATININE: CPT

## 2023-05-11 RX ORDER — AMOXICILLIN 500 MG/1
CAPSULE ORAL
COMMUNITY
Start: 2023-04-28

## 2023-05-11 ASSESSMENT — ENCOUNTER SYMPTOMS
FEVER: 0
DEPRESSION: 0
INSOMNIA: 1
NERVOUS/ANXIOUS: 1
SHORTNESS OF BREATH: 0
CHILLS: 0
BACK PAIN: 1

## 2023-05-11 ASSESSMENT — FIBROSIS 4 INDEX: FIB4 SCORE: 1.62

## 2023-05-11 NOTE — PROGRESS NOTES
Chief Complaint   Patient presents with    [facility]      Subjective:     Gabriella Mckee is a 77 y.o. female presenting for      Problem   Tremor    Patient does note that she has a tremor to her left arm which is stable and has not bothersome at this time       Left Foot Pain    cuases change in walking   Left gret toe  Walking on side of foot due to pain   Following with someone at  [facility]     Total Knee Replacement Status, Right    Patient notes that she had a total knee replacement completed with Dr. Stanley from a renal orthopedic in January 2023.  This seems to be improving and no complications        Elevated Blood Pressure Reading    bp in office  136/78       Uncontrolled Type 2 Diabetes Mellitus With Hyperglycemia (Hcc)    Currently controlled with 1000 mg metformin daily, lifestyle, diet and exercise.   Retinal exam completed: Follows up with ophthalmologist tomorrow  LDL: Lipid profile completed 11/2021   ACEi/ARB?  Not currently on any  Statin?  Refused  Urine Albumin/creatinine: Completed today  Tobacco use: None  BP control: Controlled 136/78  Concomitant HTN?  None  A1C 6.9% January 2023  A1C due: July  Last monofilament exam-completed today  Patient tolerates medications well with no significant or bothersome side effects.   Denies polyuria, polydipsia, polyphagia.         Sciatica of Left Side    Patient notes numbness and sciatica after long days of yard work.  She had previously seen a specialist, spinal surgeon for injections but did not that they improved        Former Smoker    quit smoking at age 30 yo  Was smoking 1 ppd for 13 years         Spinal Stenosis of Lumbar Region    Tylenol as needed         Elevated Ldl Cholesterol Level    Patient was previously started on Crestor however she states after 1 dose she had diarrhea incontinence overnight.  She states that she has no plan to get back on a statin at this time.  She does state that she plans to work on her diet and  exercise.  Patient does need new labs        Pain of Both Hip Joints    Patient states that her pain is chronic in nature and tends to be on both hips.  She believes this is mostly related to old age versus anything else.        Moderate Episode of Recurrent Major Depressive Disorder (Hcc)    Takes prozac 40 mg daily   Follow up with psych twice a year       Stress Incontinence    History of for the last year  Notes it has gotten worse  Wears a pad  Does not do kegel exercises or medications   Is able to tolerate at this time         Osteopenia of Multiple Sites    2017 osteopenia   2019 showed worsening osteoperni   Is physically active with walking, works on yard outside daily   Takes OTC vitamin d and calcium       Panic Attacks    See psychiatry - Debra Snowden in [facility]  Driving a trigger for panic attacks sometimes  Takes 0.5mg alprazolam as needed and prozac 40 mg daily   Follows up with Psych twice yearly   Has been on since 2016         Insomnia    Patient currently takes 7.5 mg of mirtazapine.  She notes that controlled symptoms with this.              Review of Systems   Constitutional:  Negative for chills and fever.   Respiratory:  Negative for shortness of breath.    Cardiovascular:  Negative for chest pain.   Musculoskeletal:  Positive for back pain.   Psychiatric/Behavioral:  Negative for depression. The patient is nervous/anxious and has insomnia.           Current Outpatient Medications:     amoxicillin (AMOXIL) 500 MG Cap, TAKE FOUR CAPSULES BY MOUTH ONE HOUR BEFORE APPOINTMENT, Disp: , Rfl:     metFORMIN (GLUCOPHAGE) 500 MG Tab, Take 2 Tablets by mouth every day., Disp: 180 Tablet, Rfl: 0    acetaminophen (TYLENOL) 500 MG Tab, Take 500-1,000 mg by mouth every 6 hours as needed., Disp: , Rfl:     Blood Glucose Test Strips, Use one One Touch Verio strip or insurance preference to test blood sugar twice daily., Disp: 100 Strip, Rfl: 0    Lancets, Use one One Touch Verio lancet or insurance  preference to test blood sugar twice daily., Disp: 100 Each, Rfl: 0    Blood Glucose Meter Kit, Test blood sugar as recommended by provider. One Touch Verio or insurance preference blood glucose monitoring kit., Disp: 1 Kit, Rfl: 0    Alcohol Swabs, Wipe site with prep pad prior to injection., Disp: 100 Each, Rfl: 3    fluoxetine (PROZAC) 40 MG capsule, Take 40 mg by mouth every day., Disp: , Rfl:     alprazolam (XANAX) 0.5 MG Tab, Take 0.5 mg by mouth 1 time a day as needed for Anxiety., Disp: , Rfl:     mirtazapine (REMERON) 15 MG Tab, Take 15 mg by mouth every evening., Disp: , Rfl:     Past Medical History:   Diagnosis Date    Anxiety     Arthritis     knees hands spine    Dental disorder 2023    temporary upper teeth    Depression     w/ anxiety    Diabetes (HCC)     oral medication    Gynecological disorder     ovarian cyst    High cholesterol 2023    not taking medication at this time    Pain     knees/ back with activity    Panic attack     Renal disorder 2021    hx kidney stone    Stroke (HCC)        Past Surgical History:   Procedure Laterality Date    PB TOTAL KNEE ARTHROPLASTY Right 2023    Procedure: ARTHROPLASTY, KNEE, TOTAL, RIGHT;  Surgeon: Jaden Angeles M.D.;  Location: SURGERY [facility];  Service: Orthopedics    COLONOSCOPY  2022    COLONOSCOPY      abdominal fissure removed    BLOCK EPIDURAL STEROID INJECTION      GANGLION EXCISION      R wrist     TUBAL COAGULATION LAPAROSCOPIC BILATERAL         Social History     Tobacco Use    Smoking status: Former     Packs/day: 1.00     Years: 10.00     Pack years: 10.00     Types: Cigarettes     Quit date: 1976     Years since quittin.8    Smokeless tobacco: Never   Vaping Use    Vaping Use: Never used   Substance Use Topics    Alcohol use: Yes     Comment: 1 glass every week or less    Drug use: No       Family History   Problem Relation Age of Onset    Alcohol/Drug Mother        Patient has no known  "allergies.    Allergies, past medical history, past surgical history, family history, social history reviewed and updated    Objective:     Vitals: /78 (BP Location: Left arm, Patient Position: Sitting, BP Cuff Size: Adult)   Pulse 91   Temp 37.1 °C (98.8 °F) (Temporal)   Resp 14   Ht 1.753 m (5' 9\")   Wt 77.7 kg (171 lb 6.4 oz)   SpO2 94%   BMI 25.31 kg/m²     Physical Exam  Constitutional:       Appearance: Normal appearance. She is normal weight.   HENT:      Head: Normocephalic and atraumatic.      Nose: Nose normal.      Mouth/Throat:      Mouth: Mucous membranes are moist.      Pharynx: Oropharynx is clear.   Eyes:      Extraocular Movements: Extraocular movements intact.      Conjunctiva/sclera: Conjunctivae normal.      Pupils: Pupils are equal, round, and reactive to light.   Cardiovascular:      Rate and Rhythm: Normal rate and regular rhythm.      Pulses: Normal pulses.      Heart sounds: Normal heart sounds.   Pulmonary:      Effort: Pulmonary effort is normal.      Breath sounds: Normal breath sounds.   Musculoskeletal:         General: Normal range of motion.   Skin:     General: Skin is warm and dry.      Capillary Refill: Capillary refill takes less than 2 seconds.   Neurological:      General: No focal deficit present.      Mental Status: She is alert and oriented to person, place, and time.   Psychiatric:         Mood and Affect: Mood normal.         Behavior: Behavior normal.         Thought Content: Thought content normal.         Judgment: Judgment normal.         Assessment/Plan:     1. Uncontrolled type 2 diabetes mellitus with hyperglycemia (HCC)  Chronic and stable condition  - Microalbumin Creat Ratio Urine (Clinic Collect); Future  - Diabetic Monofilament LE Exam  - Lipid Profile; Future  - metFORMIN (GLUCOPHAGE) 500 MG Tab; Take 2 Tablets by mouth every day.  Dispense: 180 Tablet; Refill: 0    2. Panic attacks  Chronic and stable condition  Continue to follow psychiatry    3. " Insomnia, unspecified type  Chronic and stable condition  Continue with mirtazapine    4. Moderate episode of recurrent major depressive disorder (HCC)  Chronic and stable condition  Continue present    5. Stress incontinence  Chronic and stable condition  We will continue to monitor    6. Osteopenia of multiple sites  Chronic and stable condition  Continue with O2 supplements    7. Pain of both hip joints  Chronic and stable condition    8. Former smoker  Chronic stable condition  Continue to cessation of smoking    9. Spinal stenosis of lumbar region, unspecified whether neurogenic claudication present  Chronic and stable condition    10. Elevated LDL cholesterol level  Chronic stable condition  New labs ordered.  Discussed with patient that if these labs do come back with worsening lipid panel we will possibly get her started here with follow-up with cardiology or potentially do a cardiac calcium score as she is not interested in statins    11. Sciatica of left side  Chronic stable condition    12. Elevated blood pressure reading  Chronic and stable condition    13. Total knee replacement status, right  Chronic stable condition    14. Left foot pain  Chronic stable condition  Continue with surgery for bunion    15. Tremor  Chronic stable condition       Discussed with patient possible alternative diagnoses, patient is to take all medications as prescribed.     If symptoms persist FU w/PCP, if symptoms worsen go to emergency room.     If experiencing any side effects from prescribed medications reports to the office immediately or go to emergency room.    Reviewed indication, dosage, usage and potential adverse effects of prescribed medications.     Reviewed risks and benefits of treatment plan. Patient verbalizes understanding of all instruction and verbally agrees to plan.    Discussed plan with the patient, and she agrees to the above.      I personally reviewed prior external notes and test results pertinent  to today's visit.        Return for Pending labs.      Please note that this dictation was created using voice recognition software. I have made every reasonable attempt to correct obvious errors, but I expect that there may be errors of grammar and possibly content that I did not discover before finalizing the note.

## 2023-05-12 DIAGNOSIS — E11.65 UNCONTROLLED TYPE 2 DIABETES MELLITUS WITH HYPERGLYCEMIA (HCC): ICD-10-CM

## 2023-05-13 LAB
CREAT UR-MCNC: 128.69 MG/DL
MICROALBUMIN UR-MCNC: <1.2 MG/DL
MICROALBUMIN/CREAT UR: NORMAL MG/G (ref 0–30)

## 2023-05-16 ENCOUNTER — HOSPITAL ENCOUNTER (OUTPATIENT)
Dept: LAB | Facility: MEDICAL CENTER | Age: 78
End: 2023-05-16
Attending: NURSE PRACTITIONER
Payer: MEDICARE

## 2023-05-16 DIAGNOSIS — E11.65 UNCONTROLLED TYPE 2 DIABETES MELLITUS WITH HYPERGLYCEMIA (HCC): ICD-10-CM

## 2023-05-16 LAB
CHOLEST SERPL-MCNC: 191 MG/DL (ref 100–199)
FASTING STATUS PATIENT QL REPORTED: NORMAL
HDLC SERPL-MCNC: 48 MG/DL
LDLC SERPL CALC-MCNC: 108 MG/DL
TRIGL SERPL-MCNC: 173 MG/DL (ref 0–149)

## 2023-05-16 PROCEDURE — 80061 LIPID PANEL: CPT

## 2023-05-16 PROCEDURE — 36415 COLL VENOUS BLD VENIPUNCTURE: CPT

## 2023-07-18 ENCOUNTER — OFFICE VISIT (OUTPATIENT)
Dept: MEDICAL GROUP | Facility: PHYSICIAN GROUP | Age: 78
End: 2023-07-18
Payer: MEDICARE

## 2023-07-18 VITALS
OXYGEN SATURATION: 95 % | HEIGHT: 69 IN | BODY MASS INDEX: 25.09 KG/M2 | HEART RATE: 85 BPM | SYSTOLIC BLOOD PRESSURE: 138 MMHG | RESPIRATION RATE: 14 BRPM | DIASTOLIC BLOOD PRESSURE: 72 MMHG | TEMPERATURE: 98 F | WEIGHT: 169.4 LBS

## 2023-07-18 DIAGNOSIS — E11.65 UNCONTROLLED TYPE 2 DIABETES MELLITUS WITH HYPERGLYCEMIA (HCC): ICD-10-CM

## 2023-07-18 DIAGNOSIS — H91.91 HEARING LOSS OF RIGHT EAR, UNSPECIFIED HEARING LOSS TYPE: ICD-10-CM

## 2023-07-18 PROBLEM — H91.90 HEARING LOSS: Status: ACTIVE | Noted: 2023-07-18

## 2023-07-18 LAB
HBA1C MFR BLD: 6.3 % (ref ?–5.8)
POCT INT CON NEG: NEGATIVE
POCT INT CON POS: POSITIVE

## 2023-07-18 PROCEDURE — 3078F DIAST BP <80 MM HG: CPT | Performed by: NURSE PRACTITIONER

## 2023-07-18 PROCEDURE — 3075F SYST BP GE 130 - 139MM HG: CPT | Performed by: NURSE PRACTITIONER

## 2023-07-18 PROCEDURE — 99214 OFFICE O/P EST MOD 30 MIN: CPT | Performed by: NURSE PRACTITIONER

## 2023-07-18 PROCEDURE — 83036 HEMOGLOBIN GLYCOSYLATED A1C: CPT | Performed by: NURSE PRACTITIONER

## 2023-07-18 ASSESSMENT — PATIENT HEALTH QUESTIONNAIRE - PHQ9
3. TROUBLE FALLING OR STAYING ASLEEP OR SLEEPING TOO MUCH: NOT AT ALL
4. FEELING TIRED OR HAVING LITTLE ENERGY: NOT AT ALL
SUM OF ALL RESPONSES TO PHQ9 QUESTIONS 1 AND 2: 0
8. MOVING OR SPEAKING SO SLOWLY THAT OTHER PEOPLE COULD HAVE NOTICED. OR THE OPPOSITE, BEING SO FIGETY OR RESTLESS THAT YOU HAVE BEEN MOVING AROUND A LOT MORE THAN USUAL: NOT AT ALL
1. LITTLE INTEREST OR PLEASURE IN DOING THINGS: NOT AT ALL
9. THOUGHTS THAT YOU WOULD BE BETTER OFF DEAD, OR OF HURTING YOURSELF: NOT AT ALL
SUM OF ALL RESPONSES TO PHQ QUESTIONS 1-9: 0
2. FEELING DOWN, DEPRESSED, IRRITABLE, OR HOPELESS: NOT AT ALL
7. TROUBLE CONCENTRATING ON THINGS, SUCH AS READING THE NEWSPAPER OR WATCHING TELEVISION: NOT AT ALL
5. POOR APPETITE OR OVEREATING: NOT AT ALL
6. FEELING BAD ABOUT YOURSELF - OR THAT YOU ARE A FAILURE OR HAVE LET YOURSELF OR YOUR FAMILY DOWN: NOT AL ALL

## 2023-07-18 ASSESSMENT — ENCOUNTER SYMPTOMS
FEVER: 0
CHILLS: 0
SHORTNESS OF BREATH: 0

## 2023-07-18 ASSESSMENT — FIBROSIS 4 INDEX: FIB4 SCORE: 1.62

## 2023-07-19 NOTE — PROGRESS NOTES
"CC:  Chief Complaint   Patient presents with    Ear Fullness     Buzzing noise     Referral Needed     ENT       HISTORY OF PRESENT ILLNESS: Patient is a 77 y.o. female established patient presenting     Problem   Hearing Loss    Onset 1 month ago  Woke up with hearing loss to right ear as the day progressed it seemed to improve then again 2 days ago noted similar event and notes its taken a bit longer to get better  History of tinnitus in past but not with hearing loss  Has not tried anything for it   Noticed a slight decrease in hearing over the last few years as well   Denies pain, chills, fevers       Uncontrolled Type 2 Diabetes Mellitus With Hyperglycemia (Hcc)    Continues to take metformin 1000mg daily  HA1c from 6.9 down to 6.3    5/11/2023  Currently controlled with 1000 mg metformin daily, lifestyle, diet and exercise.   Retinal exam completed: Follows up with ophthalmologist tomorrow  LDL: Lipid profile completed 11/2021   ACEi/ARB?  Not currently on any  Statin?  Refused  Urine Albumin/creatinine: Completed today  Tobacco use: None  BP control: Controlled 136/78  Concomitant HTN?  None  A1C 6.9% January 2023  A1C due: July  Last monofilament exam-completed today  Patient tolerates medications well with no significant or bothersome side effects.   Denies polyuria, polydipsia, polyphagia.               Review of Systems   Constitutional:  Negative for chills and fever.   HENT:  Positive for hearing loss and tinnitus. Negative for ear discharge and ear pain.    Respiratory:  Negative for shortness of breath.    Cardiovascular:  Negative for chest pain.             - NOTE: All other systems reviewed and are negative, except as in HPI.      Exam:    /72 (BP Location: Left arm, Patient Position: Sitting, BP Cuff Size: Adult)   Pulse 85   Temp 36.7 °C (98 °F) (Temporal)   Resp 14   Ht 1.753 m (5' 9\")   Wt 76.8 kg (169 lb 6.4 oz)   SpO2 95%  Body mass index is 25.02 kg/m².    Physical " Exam  Constitutional:       Appearance: Normal appearance. She is normal weight.   HENT:      Head: Normocephalic and atraumatic.      Right Ear: Tympanic membrane, ear canal and external ear normal.      Left Ear: Tympanic membrane, ear canal and external ear normal.   Pulmonary:      Effort: Pulmonary effort is normal.   Neurological:      Mental Status: She is alert and oriented to person, place, and time.   Psychiatric:         Behavior: Behavior normal.           Assessment/Plan:    1. Uncontrolled type 2 diabetes mellitus with hyperglycemia (HCC)  Chronic and stable condition   - POCT A1C 6.3    2. Hearing loss of right ear, unspecified hearing loss type  Undiagnosed new condition with uncertain prognosis  - Referral to Audiology       Discussed with patient possible alternative diagnoses, patient is to take all medications as prescribed.     If symptoms persist FU w/PCP, if symptoms worsen go to emergency room.     If experiencing any side effects from prescribed medications reports to the office immediately or go to emergency room.    Reviewed indication, dosage, usage and potential adverse effects of prescribed medications.     Reviewed risks and benefits of treatment plan. Patient verbalizes understanding of all instruction and verbally agrees to plan.      Return for AWV 8/11/2023.      Please note that this dictation was created using voice recognition software. I have made every reasonable attempt to correct obvious errors, but I expect that there are errors of grammar and possibly content that I did not discover before finalizing the note.

## 2023-07-26 DIAGNOSIS — E11.65 UNCONTROLLED TYPE 2 DIABETES MELLITUS WITH HYPERGLYCEMIA (HCC): ICD-10-CM

## 2023-10-26 DIAGNOSIS — E11.65 UNCONTROLLED TYPE 2 DIABETES MELLITUS WITH HYPERGLYCEMIA (HCC): ICD-10-CM

## 2023-12-17 ENCOUNTER — APPOINTMENT (OUTPATIENT)
Dept: URGENT CARE | Facility: PHYSICIAN GROUP | Age: 78
End: 2023-12-17
Payer: MEDICARE

## 2023-12-17 ENCOUNTER — OFFICE VISIT (OUTPATIENT)
Dept: URGENT CARE | Facility: PHYSICIAN GROUP | Age: 78
End: 2023-12-17
Payer: MEDICARE

## 2023-12-17 VITALS
HEIGHT: 69 IN | OXYGEN SATURATION: 98 % | TEMPERATURE: 97.4 F | DIASTOLIC BLOOD PRESSURE: 68 MMHG | WEIGHT: 179 LBS | SYSTOLIC BLOOD PRESSURE: 126 MMHG | BODY MASS INDEX: 26.51 KG/M2 | HEART RATE: 83 BPM | RESPIRATION RATE: 18 BRPM

## 2023-12-17 DIAGNOSIS — Z87.442 PERSONAL HISTORY OF KIDNEY STONES: ICD-10-CM

## 2023-12-17 DIAGNOSIS — S76.011A STRAIN OF RIGHT HIP, INITIAL ENCOUNTER: ICD-10-CM

## 2023-12-17 LAB
APPEARANCE UR: CLEAR
BILIRUB UR STRIP-MCNC: NORMAL MG/DL
COLOR UR AUTO: YELLOW
GLUCOSE UR STRIP.AUTO-MCNC: NORMAL MG/DL
KETONES UR STRIP.AUTO-MCNC: NORMAL MG/DL
LEUKOCYTE ESTERASE UR QL STRIP.AUTO: NORMAL
NITRITE UR QL STRIP.AUTO: NORMAL
PH UR STRIP.AUTO: 5.5 [PH] (ref 5–8)
PROT UR QL STRIP: NORMAL MG/DL
RBC UR QL AUTO: NORMAL
SP GR UR STRIP.AUTO: 1.01
UROBILINOGEN UR STRIP-MCNC: 0.2 MG/DL

## 2023-12-17 PROCEDURE — 3074F SYST BP LT 130 MM HG: CPT | Performed by: FAMILY MEDICINE

## 2023-12-17 PROCEDURE — 81002 URINALYSIS NONAUTO W/O SCOPE: CPT | Performed by: FAMILY MEDICINE

## 2023-12-17 PROCEDURE — 99213 OFFICE O/P EST LOW 20 MIN: CPT | Performed by: FAMILY MEDICINE

## 2023-12-17 PROCEDURE — 3078F DIAST BP <80 MM HG: CPT | Performed by: FAMILY MEDICINE

## 2023-12-17 ASSESSMENT — FIBROSIS 4 INDEX: FIB4 SCORE: 1.64

## 2023-12-17 NOTE — PROGRESS NOTES
Chief Complaint:    Chief Complaint   Patient presents with    Hip Pain    Back Pain     Low back pain, hx kidney pain. X 1 wk         History of Present Illness:    Pain in right buttock/lateral right hip area - just woke up with it 1 week ago. Prior day she was raking leaves. No injury or trauma. No radiating pain down legs. No loss of bowel/bladder control. No saddle anesthesia. Tylenol helped a little. Advil did not help. She has had kidney stone in the past.      Past Medical History:    Past Medical History:   Diagnosis Date    Anxiety     Arthritis     knees hands spine    Dental disorder 2023    temporary upper teeth    Depression     w/ anxiety    Diabetes (HCC)     oral medication    Gynecological disorder     ovarian cyst    High cholesterol 2023    not taking medication at this time    Pain     knees/ back with activity    Panic attack     Renal disorder 2021    hx kidney stone    Stroke ([facility])      Past Surgical History:    Past Surgical History:   Procedure Laterality Date    PB TOTAL KNEE ARTHROPLASTY Right 2023    Procedure: ARTHROPLASTY, KNEE, TOTAL, RIGHT;  Surgeon: Jaden Angeles M.D.;  Location: SURGERY [facility];  Service: Orthopedics    COLONOSCOPY  2022    COLONOSCOPY      abdominal fissure removed    BLOCK EPIDURAL STEROID INJECTION      GANGLION EXCISION      R wrist     TUBAL COAGULATION LAPAROSCOPIC BILATERAL       Social History:    Social History     Socioeconomic History    Marital status:      Spouse name: Not on file    Number of children: Not on file    Years of education: Not on file    Highest education level: Associate degree: occupational, technical, or vocational program   Occupational History    Not on file   Tobacco Use    Smoking status: Former     Current packs/day: 0.00     Average packs/day: 1 pack/day for 10.0 years (10.0 ttl pk-yrs)     Types: Cigarettes     Start date: 1966     Quit date: 1976     Years since quittin.4     Smokeless tobacco: Never   Vaping Use    Vaping Use: Never used   Substance and Sexual Activity    Alcohol use: Yes     Comment: 1 glass every week or less    Drug use: No    Sexual activity: Never   Other Topics Concern    Not on file   Social History Narrative    Not on file     Social Determinants of Health     Financial Resource Strain: Low Risk  (1/17/2023)    Overall Financial Resource Strain (CARDIA)     Difficulty of Paying Living Expenses: Not hard at all   Food Insecurity: No Food Insecurity (1/17/2023)    Hunger Vital Sign     Worried About Running Out of Food in the Last Year: Never true     Ran Out of Food in the Last Year: Never true   Transportation Needs: No Transportation Needs (1/17/2023)    PRAPARE - Transportation     Lack of Transportation (Medical): No     Lack of Transportation (Non-Medical): No   Physical Activity: Unknown (1/17/2023)    Exercise Vital Sign     Days of Exercise per Week: Not on file     Minutes of Exercise per Session: 20 min   Stress: Stress Concern Present (1/17/2023)    [employer] - Occupational Stress Questionnaire     Feeling of Stress : To some extent   Social Connections: Unknown (1/17/2023)    Social Connection and Isolation Panel [NHANES]     Frequency of Communication with Friends and Family: Once a week     Frequency of Social Gatherings with Friends and Family: Patient refused     Attends Latter-day Services: Never     Active Member of Clubs or Organizations: No     Attends Club or Organization Meetings: Never     Marital Status:    Intimate Partner Violence: Not on file   Housing Stability: Low Risk  (1/17/2023)    Housing Stability Vital Sign     Unable to Pay for Housing in the Last Year: No     Number of Places Lived in the Last Year: 1     Unstable Housing in the Last Year: No     Family History:    Family History   Problem Relation Age of Onset    Alcohol/Drug Mother      Medications:    Current Outpatient Medications  "on File Prior to Visit   Medication Sig Dispense Refill    metFORMIN (GLUCOPHAGE) 500 MG Tab Take 2 tablets by mouth once daily 180 Tablet 0    amoxicillin (AMOXIL) 500 MG Cap TAKE FOUR CAPSULES BY MOUTH ONE HOUR BEFORE APPOINTMENT      acetaminophen (TYLENOL) 500 MG Tab Take 500-1,000 mg by mouth every 6 hours as needed.      fluoxetine (PROZAC) 40 MG capsule Take 40 mg by mouth every day.      alprazolam (XANAX) 0.5 MG Tab Take 0.5 mg by mouth 1 time a day as needed for Anxiety.      mirtazapine (REMERON) 15 MG Tab Take 15 mg by mouth every evening.       No current facility-administered medications on file prior to visit.     Allergies:    No Known Allergies      Vitals:    Vitals:    23 1118   BP: 126/68   Pulse: 83   Resp: 18   Temp: 36.3 °C (97.4 °F)   TempSrc: Temporal   SpO2: 98%   Weight: 81.2 kg (179 lb)   Height: 1.753 m (5' 9\")       Physical Exam:    Constitutional: Vital signs reviewed. Appears well-developed and well-nourished. No acute distress.   Eyes: Sclera white, conjunctivae clear.  ENT: External ears normal. Hearing normal.  Pulmonary/Chest: Respirations non-labored.  Musculoskeletal: Increased tenderness in right buttock/right lateral hip region on side to side lateral bending at waist. Otherwise has good lumbar range of motion. Normal gait. No tenderness to palpation. No muscular atrophy or weakness.  Neurological: Alert and oriented to person, place, and time. Muscle tone normal. Coordination normal. Light touch and sensation normal.   Skin: No rashes or lesions. Warm, dry, normal turgor.  Psychiatric: Normal mood and affect. Behavior is normal. Judgment and thought content normal.       Diagnostics:    Urine dipstick is normal.      Assessment / Plan & Medical Decision Makin. Strain of right hip, initial encounter  - diclofenac sodium (VOLTAREN) 1 % Gel; APPLY 2-4 GRAMS TO PAINFUL RIGHT HIP/RIGHT BUTTOCK AREA EVERY 6 HOURS IF NEEDED TO HELP INFLAMMATION.  Dispense: 100 g; " Refill: 1    2. Personal history of kidney stones  - POCT Urinalysis       Discussed with her DDX, management options, and risks, benefits, and alternatives to treatment plan agreed upon.    Pain in right buttock/lateral right hip area - just woke up with it 1 week ago. Prior day she was raking leaves. No injury or trauma. No radiating pain down legs. No loss of bowel/bladder control. No saddle anesthesia. Tylenol helped a little. Advil did not help. She has had kidney stone in the past.    Increased tenderness in right buttock/right lateral hip region on side to side lateral bending at waist. Otherwise has good lumbar range of motion.     Urine dipstick is normal.    Likely MSK inflammation as cause of symptoms.     Rec'd relative rest.    Agreeable to medication prescribed for anti-inflammatory effect.    May take over-the-counter Acetaminophen (Tylenol) as needed for pain.     Discussed expected course of duration, time for improvement, and to seek follow-up in Emergency Room, urgent care, or with PCP if getting worse at any time or not improving within expected time frame.

## 2023-12-19 ENCOUNTER — OFFICE VISIT (OUTPATIENT)
Dept: MEDICAL GROUP | Facility: PHYSICIAN GROUP | Age: 78
End: 2023-12-19
Payer: MEDICARE

## 2023-12-19 VITALS
OXYGEN SATURATION: 95 % | DIASTOLIC BLOOD PRESSURE: 80 MMHG | HEART RATE: 81 BPM | WEIGHT: 176.4 LBS | TEMPERATURE: 97.7 F | SYSTOLIC BLOOD PRESSURE: 134 MMHG | HEIGHT: 69 IN | BODY MASS INDEX: 26.13 KG/M2 | RESPIRATION RATE: 18 BRPM

## 2023-12-19 DIAGNOSIS — M25.551 HIP PAIN, ACUTE, RIGHT: ICD-10-CM

## 2023-12-19 DIAGNOSIS — E11.65 UNCONTROLLED TYPE 2 DIABETES MELLITUS WITH HYPERGLYCEMIA (HCC): ICD-10-CM

## 2023-12-19 DIAGNOSIS — R03.0 ELEVATED BLOOD PRESSURE READING: ICD-10-CM

## 2023-12-19 DIAGNOSIS — E78.00 ELEVATED LDL CHOLESTEROL LEVEL: ICD-10-CM

## 2023-12-19 PROCEDURE — 99214 OFFICE O/P EST MOD 30 MIN: CPT | Performed by: NURSE PRACTITIONER

## 2023-12-19 PROCEDURE — 3075F SYST BP GE 130 - 139MM HG: CPT | Performed by: NURSE PRACTITIONER

## 2023-12-19 PROCEDURE — 3079F DIAST BP 80-89 MM HG: CPT | Performed by: NURSE PRACTITIONER

## 2023-12-19 RX ORDER — GABAPENTIN 100 MG/1
100 CAPSULE ORAL 3 TIMES DAILY
Qty: 90 CAPSULE | Refills: 0 | Status: SHIPPED | OUTPATIENT
Start: 2023-12-19 | End: 2024-01-12

## 2023-12-19 ASSESSMENT — ENCOUNTER SYMPTOMS
WEAKNESS: 0
DIZZINESS: 0
WEIGHT LOSS: 0
CHILLS: 0
HEADACHES: 0
BACK PAIN: 1
FEVER: 0
SHORTNESS OF BREATH: 0
PALPITATIONS: 0
TINGLING: 0

## 2023-12-19 ASSESSMENT — FIBROSIS 4 INDEX: FIB4 SCORE: 1.64

## 2023-12-19 NOTE — PROGRESS NOTES
"CC:  Chief Complaint   Patient presents with    Hip Pain     R hip pain x last Sunday, burning pain, has tried heat/ice, tylenol       HISTORY OF PRESENT ILLNESS: Patient is a 78 y.o. female established patient presenting     Problem   Hip Pain, Acute, Right    Notes she was doing yard work [place] 12/10/2023 with leaves and moving rocks. Woke up Monday and noted pain to her right hip and down her groin.   Tried advil, ice tylenol for one day each with no improvement.   States she was seen in the  12/17/2023, did not complete any imaging, they did a urine test to verify there was not a UTI.     Notes when walking she gets a achy burning sensation from her back to hip groin  Decreased ROM due to pain in hip  Denies weakness, worsening popping or clicking of joint           Review of Systems   Constitutional:  Negative for chills, fever, malaise/fatigue and weight loss.   Respiratory:  Negative for shortness of breath.    Cardiovascular:  Negative for chest pain and palpitations.   Musculoskeletal:  Positive for back pain and joint pain.   Neurological:  Negative for dizziness, tingling, weakness and headaches.             - NOTE: All other systems reviewed and are negative, except as in HPI.      Exam:    /80 (BP Location: Left arm, Patient Position: Sitting, BP Cuff Size: Adult)   Pulse 81   Temp 36.5 °C (97.7 °F) (Temporal)   Resp 18   Ht 1.753 m (5' 9\")   Wt 80 kg (176 lb 6.4 oz)   SpO2 95%  Body mass index is 26.05 kg/m².    Physical Exam  Constitutional:       General: She is not in acute distress.     Appearance: Normal appearance. She is normal weight. She is not ill-appearing.   HENT:      Head: Normocephalic and atraumatic.   Pulmonary:      Effort: Pulmonary effort is normal.   Musculoskeletal:         General: Tenderness and signs of injury present.      Thoracic back: Normal.      Lumbar back: Spasms and tenderness present. No swelling, edema, deformity, signs of trauma or bony tenderness. " Decreased range of motion. Negative right straight leg raise test and negative left straight leg raise test. No scoliosis.        Back:       Right hip: Tenderness present. No deformity, lacerations, bony tenderness or crepitus. Decreased range of motion. Decreased strength.      Left hip: Normal.        Legs:    Neurological:      Mental Status: She is alert.           Assessment/Plan:    1. Hip pain, acute, right  Undiagnosed new condition uncertain prognosis  - DX-HIP-UNILATERAL-WITH PELVIS-1 VIEW RIGHT; Future  - gabapentin (NEURONTIN) 100 MG Cap; Take 1 Capsule by mouth 3 times a day.  Dispense: 90 Capsule; Refill: 0  - Referral to Physical Therapy    2. Uncontrolled type 2 diabetes mellitus with hyperglycemia (HCC)  Chronic and stable condition  - Comp Metabolic Panel; Future  - HEMOGLOBIN A1C; Future  - Lipid Profile; Future    3. Elevated LDL cholesterol level  Chronic and stable condition  - Comp Metabolic Panel; Future  - HEMOGLOBIN A1C; Future  - Lipid Profile; Future    4. Elevated blood pressure reading  Chronic and stable condition  - Comp Metabolic Panel; Future  - HEMOGLOBIN A1C; Future  - Lipid Profile; Future       Discussed with patient possible alternative diagnoses, patient is to take all medications as prescribed.     If symptoms persist FU w/PCP, if symptoms worsen go to emergency room.     If experiencing any side effects from prescribed medications reports to the office immediately or go to emergency room.    Reviewed indication, dosage, usage and potential adverse effects of prescribed medications.     Reviewed risks and benefits of treatment plan. Patient verbalizes understanding of all instruction and verbally agrees to plan.      Return in about 5 weeks (around 1/23/2024) for Follow-up physical therapy, medication, back pain.      Please note that this dictation was created using voice recognition software. I have made every reasonable attempt to correct obvious errors, but I expect that  there are errors of grammar and possibly content that I did not discover before finalizing the note.

## 2023-12-19 NOTE — PATIENT INSTRUCTIONS
Tylenol 850mg take every 8 hours- helps with pain     Advil/motrin/ibuprofen take 1 capsule every 4-6 hours- DO NOT EXCEED 6 capsules in 24 hours- helps with pain and inflammation

## 2023-12-21 ENCOUNTER — NON-PROVIDER VISIT (OUTPATIENT)
Dept: URGENT CARE | Facility: PHYSICIAN GROUP | Age: 78
End: 2023-12-21
Payer: MEDICARE

## 2023-12-21 ENCOUNTER — APPOINTMENT (OUTPATIENT)
Dept: RADIOLOGY | Facility: IMAGING CENTER | Age: 78
End: 2023-12-21
Attending: NURSE PRACTITIONER
Payer: MEDICARE

## 2023-12-21 ENCOUNTER — HOSPITAL ENCOUNTER (OUTPATIENT)
Dept: LAB | Facility: MEDICAL CENTER | Age: 78
End: 2023-12-21
Attending: NURSE PRACTITIONER
Payer: MEDICARE

## 2023-12-21 DIAGNOSIS — M25.551 HIP PAIN, ACUTE, RIGHT: ICD-10-CM

## 2023-12-21 DIAGNOSIS — E11.65 UNCONTROLLED TYPE 2 DIABETES MELLITUS WITH HYPERGLYCEMIA (HCC): ICD-10-CM

## 2023-12-21 DIAGNOSIS — R03.0 ELEVATED BLOOD PRESSURE READING: ICD-10-CM

## 2023-12-21 DIAGNOSIS — E78.00 ELEVATED LDL CHOLESTEROL LEVEL: ICD-10-CM

## 2023-12-21 LAB
ALBUMIN SERPL BCP-MCNC: 4.3 G/DL (ref 3.2–4.9)
ALBUMIN/GLOB SERPL: 1.4 G/DL
ALP SERPL-CCNC: 80 U/L (ref 30–99)
ALT SERPL-CCNC: 28 U/L (ref 2–50)
ANION GAP SERPL CALC-SCNC: 11 MMOL/L (ref 7–16)
AST SERPL-CCNC: 27 U/L (ref 12–45)
BILIRUB SERPL-MCNC: 0.3 MG/DL (ref 0.1–1.5)
BUN SERPL-MCNC: 20 MG/DL (ref 8–22)
CALCIUM ALBUM COR SERPL-MCNC: 9.2 MG/DL (ref 8.5–10.5)
CALCIUM SERPL-MCNC: 9.4 MG/DL (ref 8.5–10.5)
CHLORIDE SERPL-SCNC: 105 MMOL/L (ref 96–112)
CHOLEST SERPL-MCNC: 189 MG/DL (ref 100–199)
CO2 SERPL-SCNC: 25 MMOL/L (ref 20–33)
CREAT SERPL-MCNC: 0.88 MG/DL (ref 0.5–1.4)
EST. AVERAGE GLUCOSE BLD GHB EST-MCNC: 140 MG/DL
FASTING STATUS PATIENT QL REPORTED: NORMAL
GFR SERPLBLD CREATININE-BSD FMLA CKD-EPI: 68 ML/MIN/1.73 M 2
GLOBULIN SER CALC-MCNC: 3 G/DL (ref 1.9–3.5)
GLUCOSE SERPL-MCNC: 130 MG/DL (ref 65–99)
HBA1C MFR BLD: 6.5 % (ref 4–5.6)
HDLC SERPL-MCNC: 51 MG/DL
LDLC SERPL CALC-MCNC: 101 MG/DL
POTASSIUM SERPL-SCNC: 4.7 MMOL/L (ref 3.6–5.5)
PROT SERPL-MCNC: 7.3 G/DL (ref 6–8.2)
SODIUM SERPL-SCNC: 141 MMOL/L (ref 135–145)
TRIGL SERPL-MCNC: 187 MG/DL (ref 0–149)

## 2023-12-21 PROCEDURE — 36415 COLL VENOUS BLD VENIPUNCTURE: CPT

## 2023-12-21 PROCEDURE — 83036 HEMOGLOBIN GLYCOSYLATED A1C: CPT | Mod: GA

## 2023-12-21 PROCEDURE — 80061 LIPID PANEL: CPT

## 2023-12-21 PROCEDURE — 80053 COMPREHEN METABOLIC PANEL: CPT

## 2023-12-21 PROCEDURE — 73501 X-RAY EXAM HIP UNI 1 VIEW: CPT | Mod: TC,FY,RT | Performed by: NURSE PRACTITIONER

## 2023-12-29 ENCOUNTER — OFFICE VISIT (OUTPATIENT)
Dept: MEDICAL GROUP | Facility: PHYSICIAN GROUP | Age: 78
End: 2023-12-29
Payer: MEDICARE

## 2023-12-29 VITALS
SYSTOLIC BLOOD PRESSURE: 132 MMHG | BODY MASS INDEX: 26.54 KG/M2 | OXYGEN SATURATION: 96 % | HEIGHT: 69 IN | WEIGHT: 179.2 LBS | DIASTOLIC BLOOD PRESSURE: 66 MMHG | TEMPERATURE: 97.2 F | HEART RATE: 89 BPM | RESPIRATION RATE: 16 BRPM

## 2023-12-29 DIAGNOSIS — M48.061 SPINAL STENOSIS OF LUMBAR REGION, UNSPECIFIED WHETHER NEUROGENIC CLAUDICATION PRESENT: ICD-10-CM

## 2023-12-29 DIAGNOSIS — E78.00 ELEVATED LDL CHOLESTEROL LEVEL: ICD-10-CM

## 2023-12-29 DIAGNOSIS — M25.552 PAIN OF BOTH HIP JOINTS: ICD-10-CM

## 2023-12-29 DIAGNOSIS — Z00.00 MEDICARE ANNUAL WELLNESS VISIT, SUBSEQUENT: Primary | ICD-10-CM

## 2023-12-29 DIAGNOSIS — Z23 NEED FOR VACCINATION: ICD-10-CM

## 2023-12-29 DIAGNOSIS — M25.551 PAIN OF BOTH HIP JOINTS: ICD-10-CM

## 2023-12-29 DIAGNOSIS — E11.65 UNCONTROLLED TYPE 2 DIABETES MELLITUS WITH HYPERGLYCEMIA (HCC): ICD-10-CM

## 2023-12-29 DIAGNOSIS — F33.1 MODERATE EPISODE OF RECURRENT MAJOR DEPRESSIVE DISORDER (HCC): ICD-10-CM

## 2023-12-29 DIAGNOSIS — N39.3 STRESS INCONTINENCE: ICD-10-CM

## 2023-12-29 DIAGNOSIS — M54.32 SCIATICA OF LEFT SIDE: ICD-10-CM

## 2023-12-29 DIAGNOSIS — G47.00 INSOMNIA, UNSPECIFIED TYPE: ICD-10-CM

## 2023-12-29 DIAGNOSIS — Z87.891 FORMER SMOKER: ICD-10-CM

## 2023-12-29 DIAGNOSIS — Z96.651 TOTAL KNEE REPLACEMENT STATUS, RIGHT: ICD-10-CM

## 2023-12-29 DIAGNOSIS — M17.11 OSTEOARTHRITIS OF RIGHT KNEE, UNSPECIFIED OSTEOARTHRITIS TYPE: ICD-10-CM

## 2023-12-29 DIAGNOSIS — M25.551 HIP PAIN, ACUTE, RIGHT: ICD-10-CM

## 2023-12-29 DIAGNOSIS — R25.1 TREMOR: ICD-10-CM

## 2023-12-29 DIAGNOSIS — M79.672 LEFT FOOT PAIN: ICD-10-CM

## 2023-12-29 DIAGNOSIS — R03.0 ELEVATED BLOOD PRESSURE READING: ICD-10-CM

## 2023-12-29 DIAGNOSIS — F41.0 PANIC ATTACKS: ICD-10-CM

## 2023-12-29 DIAGNOSIS — H91.90 HEARING LOSS, UNSPECIFIED HEARING LOSS TYPE, UNSPECIFIED LATERALITY: ICD-10-CM

## 2023-12-29 DIAGNOSIS — M85.89 OSTEOPENIA OF MULTIPLE SITES: ICD-10-CM

## 2023-12-29 PROCEDURE — 90662 IIV NO PRSV INCREASED AG IM: CPT | Performed by: NURSE PRACTITIONER

## 2023-12-29 PROCEDURE — 3078F DIAST BP <80 MM HG: CPT | Performed by: NURSE PRACTITIONER

## 2023-12-29 PROCEDURE — 3075F SYST BP GE 130 - 139MM HG: CPT | Performed by: NURSE PRACTITIONER

## 2023-12-29 PROCEDURE — G0008 ADMIN INFLUENZA VIRUS VAC: HCPCS | Performed by: NURSE PRACTITIONER

## 2023-12-29 PROCEDURE — G0439 PPPS, SUBSEQ VISIT: HCPCS | Mod: 25 | Performed by: NURSE PRACTITIONER

## 2023-12-29 ASSESSMENT — PATIENT HEALTH QUESTIONNAIRE - PHQ9: CLINICAL INTERPRETATION OF PHQ2 SCORE: 0

## 2023-12-29 ASSESSMENT — ENCOUNTER SYMPTOMS: GENERAL WELL-BEING: GOOD

## 2023-12-29 ASSESSMENT — FIBROSIS 4 INDEX: FIB4 SCORE: 2.11

## 2023-12-29 ASSESSMENT — ACTIVITIES OF DAILY LIVING (ADL): BATHING_REQUIRES_ASSISTANCE: 0

## 2023-12-29 NOTE — PROGRESS NOTES
No chief complaint on file.      HPI:  Gabriella Mckee is a 78 y.o. here for Medicare Annual Wellness Visit     Patient Active Problem List    Diagnosis Date Noted    Hip pain, acute, right 12/19/2023    Hearing loss 07/18/2023    Tremor 05/11/2023    Left foot pain 03/09/2023    Total knee replacement status, right 01/17/2023    Osteoarthritis, knee 10/19/2022    Kidney stone 05/12/2022    Elevated blood pressure reading 05/12/2022    Raynaud's phenomenon without gangrene 02/10/2022    Uncontrolled type 2 diabetes mellitus with hyperglycemia (HCC) 11/16/2021    Sciatica of left side 11/16/2021    Former smoker 11/01/2021    Spinal stenosis of lumbar region 11/01/2021    Elevated LDL cholesterol level 11/01/2021    Pain of both hip joints 05/13/2019    Moderate episode of recurrent major depressive disorder (HCC) 02/04/2019    Stress incontinence 02/04/2019    Osteopenia of multiple sites 02/04/2019    Panic attacks 07/06/2016    Insomnia 07/06/2016       Current Outpatient Medications   Medication Sig Dispense Refill    gabapentin (NEURONTIN) 100 MG Cap Take 1 Capsule by mouth 3 times a day. 90 Capsule 0    diclofenac sodium (VOLTAREN) 1 % Gel APPLY 2-4 GRAMS TO PAINFUL RIGHT HIP/RIGHT BUTTOCK AREA EVERY 6 HOURS IF NEEDED TO HELP INFLAMMATION. 100 g 1    metFORMIN (GLUCOPHAGE) 500 MG Tab Take 2 tablets by mouth once daily 180 Tablet 0    amoxicillin (AMOXIL) 500 MG Cap TAKE FOUR CAPSULES BY MOUTH ONE HOUR BEFORE APPOINTMENT      acetaminophen (TYLENOL) 500 MG Tab Take 500-1,000 mg by mouth every 6 hours as needed.      fluoxetine (PROZAC) 40 MG capsule Take 40 mg by mouth every day.      alprazolam (XANAX) 0.5 MG Tab Take 0.5 mg by mouth 1 time a day as needed for Anxiety.      mirtazapine (REMERON) 15 MG Tab Take 15 mg by mouth every evening.       No current facility-administered medications for this visit.          Current supplements as per medication list.     Allergies: Patient has no known  allergies.    Current social contact/activities: ***     She  reports that she quit smoking about 47 years ago. Her smoking use included cigarettes. She started smoking about 57 years ago. She has a 10.0 pack-year smoking history. She has never used smokeless tobacco. She reports current alcohol use. She reports that she does not use drugs.  Counseling given: Not Answered      ROS:    Gait: Uses {DEVICE:23315}  Ostomy: {AWV YES / NO:21192}  Other tubes: {AWV YES / NO:21192}  Amputations: {AWV YES / NO:21192}  Chronic oxygen use: {AWV YES / NO:21192}  Last eye exam: ***  Wears hearing aids: {AWV YES / NO:21192}   : {Urinary leakage?:20840}    Screening:  ***  Depression Screening  Little interest or pleasure in doing things?  0 - not at all  Feeling down, depressed , or hopeless? 0 - not at all  Patient Health Questionnaire Score: 0     If depressive symptoms identified deferred to follow up visit unless specifically addressed in assessment and plan.    Interpretation of PHQ-9 Total Score   Score Severity   1-4 No Depression   5-9 Mild Depression   10-14 Moderate Depression   15-19 Moderately Severe Depression   20-27 Severe Depression    Screening for Cognitive Impairment  Do you or any of your friends or family members have any concern about your memory? No  Three Minute Recall (Banana, Sunrise, Chair) 1/3    Regan clock face with all 12 numbers and set the hands to show 20 past 8.  Yes    Cognitive concerns identified deferred for follow up unless specifically addressed in assessment and plan.    Fall Risk Assessment  Has the patient had two or more falls in the last year or any fall with injury in the last year?  No    Safety Assessment  Do you always wear your seatbelt?  Yes  Any changes to home needed to function safely? No  Difficulty hearing.  Yes  Patient counseled about all safety risks that were identified.    Functional Assessment ADLs  Are there any barriers preventing you from cooking for yourself or  meeting nutritional needs?  No.    Are there any barriers preventing you from driving safely or obtaining transportation?  No.    Are there any barriers preventing you from using a telephone or calling for help?  No    Are there any barriers preventing you from shopping?  No.    Are there any barriers preventing you from taking care of your own finances?  No    Are there any barriers preventing you from managing your medications?  No    Are there any barriers preventing you from showering, bathing or dressing yourself? No    Are there any barriers preventing you from doing housework or laundry? No  Are there any barriers preventing you from using the toilet?No  Are you currently engaging in any exercise or physical activity?  Yes. Little walking    Self-Assessment of Health  What is your perception of your health? Good  Do you sleep more than six hours a night? Yes  In the past 7 days, how much did pain keep you from doing your normal work? None  Do you spend quality time with family or friends (virtually or in person)? Yes  Do you usually eat a heart healthy diet that constists of a variety of fruits, vegetables, whole grains and fiber? No  Do you eat foods high in fat and/or Fast Food more than three times per week? No    Advance Care Planning  Do you have an Advance Directive, Living Will, Durable Power of , or POLST? Yes  Advance Directive Living Will Durable Power of  POLST is not on file - instructed patient to bring in a copy to scan into their chart      Health Maintenance Summary            Overdue - Annual Wellness Visit (Every 366 Days) Overdue since 11/2/2022 11/01/2021  Level of Service: [facility] ANNUAL WELLNESS VISIT-INCLUDES PPPS SUBSEQUE*    11/01/2021  Visit Dx: Medicare annual wellness visit, subsequent    02/04/2019  Subsequent Annual Wellness Visit - Includes PPPS ()    02/04/2019  Visit Dx: Medicare annual wellness visit, subsequent    08/08/2017  Prob Dx: Medicare annual  wellness visit, subsequent    Only the first 5 history entries have been loaded, but more history exists.              Postponed - Hepatitis B Vaccine (Hep B) (1 of 3 - Risk 3-dose series) Postponed until 12/19/2024      No completion history exists for this topic.              Postponed - Influenza Vaccine (1) Postponed until 12/19/2024 11/01/2021  Imm Admin: Influenza Vaccine Adult HD    10/22/2013  Imm Admin: Influenza Vaccine Adult HD              Postponed - COVID-19 Vaccine (4 - 2023-24 season) Postponed until 12/19/2024 11/19/2021  Imm Admin: MODERNA SARS-COV-2 VACCINE (12+)    03/19/2021  Imm Admin: MODERNA SARS-COV-2 VACCINE (12+)    02/19/2021  Imm Admin: MODERNA SARS-COV-2 VACCINE (12+)              Diabetes: Urine Protein Screening (Yearly) Next due on 5/11/2024 05/11/2023  Microalbumin Creat Ratio Urine (Clinic Collect)              Diabetes: Monofilament / LE Exam (Yearly) Tentatively due on 5/11/2024 05/11/2023  Diabetic Monofilament LE Exam              Diabetes: Retinopathy Screening (Yearly) Next due on 5/16/2024 05/16/2023  AMB EXTERNAL RETINAL SCREENING RESULTS    12/22/2021  REFERRAL FOR RETINAL SCREENING EXAM              A1c Screening (Every 6 Months) Tentatively due on 6/21/2024 12/21/2023  HEMOGLOBIN A1C    07/18/2023  POCT A1C    01/12/2023  POCT A1C    01/06/2023  Hemoglobin A1c    05/10/2022  HEMOGLOBIN A1C    Only the first 5 history entries have been loaded, but more history exists.              Fasting Lipid Profile (Yearly) Tentatively due on 12/21/2024 12/21/2023  Lipid Profile    05/16/2023  Lipid Profile    11/01/2021  Lipid Profile    02/12/2019  Lipid Profile    08/11/2017  LIPID PROFILE    Only the first 5 history entries have been loaded, but more history exists.              SERUM CREATININE (Yearly) Next due on 12/21/2024 12/21/2023  Comp Metabolic Panel    01/06/2023  Basic Metabolic Panel    05/12/2022  COMP METABOLIC PANEL     05/10/2022  COMPREHENSIVE METABOLIC PANEL    05/05/2022  Basic Metabolic Panel    Only the first 5 history entries have been loaded, but more history exists.              Bone Density Scan (Every 5 Years) Tentatively due on 2/7/2027 02/07/2022  DS-BONE DENSITY STUDY (DEXA)    02/19/2019  DS-BONE DENSITY STUDY (DEXA)    04/04/2013  DS-BONE DENSITY STUDY (DEXA)              IMM DTaP/Tdap/Td Vaccine (2 - Td or Tdap) Next due on 8/8/2027 08/08/2017  Imm Admin: Tdap Vaccine              Pneumococcal Vaccine: 65+ Years (Series Information) Completed      08/08/2017  Imm Admin: Pneumococcal polysaccharide vaccine (PPSV-23)    10/22/2013  Imm Admin: Pneumococcal Conjugate Vaccine (Prevnar/PCV-13)              Hepatitis C Screening  Tentatively Complete      11/01/2021  Hepatitis C Antibody component of HEP C VIRUS ANTIBODY              Zoster (Shingles) Vaccines (Series Information) Completed      01/12/2022  Imm Admin: Zoster Vaccine Recombinant (RZV) (SHINGRIX)    11/03/2021  Imm Admin: Zoster Vaccine Recombinant (RZV) (SHINGRIX)    06/09/2016  Imm Admin: Zoster Vaccine Live (ZVL) (Zostavax) - HISTORICAL DATA              Hepatitis A Vaccine (Hep A) (Series Information) Aged Out      No completion history exists for this topic.              HPV Vaccines (Series Information) Aged Out      No completion history exists for this topic.              Polio Vaccine (Inactivated Polio) (Series Information) Aged Out      No completion history exists for this topic.              Meningococcal Immunization (Series Information) Aged Out      No completion history exists for this topic.              Discontinued - Mammogram  Discontinued        Frequency changed to Never automatically (Topic No Longer Applies)    12/03/2021  [id number]    02/19/2019  MA-SCREENING MAMMO BILAT W/TOMOSYNTHESIS W/CAD    09/13/2016  [id number]              Discontinued - Colorectal Cancer Screening  Discontinued         "Frequency changed to Never automatically (Topic No Longer Applies)    2022  REFERRAL TO GI FOR COLONOSCOPY    2022  Colonoscopy (Done)    2016  REFERRAL TO GI FOR COLONOSCOPY                    Patient Care Team:  JOHNATHON Moeller as PCP - General (Nurse Practitioner Primary Care)        Social History     Tobacco Use    Smoking status: Former     Current packs/day: 0.00     Average packs/day: 1 pack/day for 10.0 years (10.0 ttl pk-yrs)     Types: Cigarettes     Start date: 1966     Quit date: 1976     Years since quittin.5    Smokeless tobacco: Never   Vaping Use    Vaping Use: Never used   Substance Use Topics    Alcohol use: Yes     Comment: 1 glass every week or less    Drug use: No     Family History   Problem Relation Age of Onset    Alcohol/Drug Mother      She  has a past medical history of Anxiety, Arthritis, Dental disorder (2023), Depression, Diabetes (HCC) (), Gynecological disorder (), High cholesterol (2023), Pain, Panic attack, Renal disorder (2021), and Stroke ().   Past Surgical History:   Procedure Laterality Date    PB TOTAL KNEE ARTHROPLASTY Right 2023    Procedure: ARTHROPLASTY, KNEE, TOTAL, RIGHT;  Surgeon: Jaden Angeles M.D.;  Location: SURGERY [facility];  Service: Orthopedics    COLONOSCOPY  2022    COLONOSCOPY      abdominal fissure removed    BLOCK EPIDURAL STEROID INJECTION      GANGLION EXCISION      R wrist     TUBAL COAGULATION LAPAROSCOPIC BILATERAL         Exam:   Pulse 89   Temp 36.2 °C (97.2 °F) (Temporal)   Resp 16   Ht 1.753 m (5' 9\")   Wt 81.3 kg (179 lb 3.2 oz)   SpO2 96%  Body mass index is 26.46 kg/m².    Hearing {GOOD/FAIR/POOR/EXCELLENT:29426}.    Dentition {DENTITION:96692}  Alert, oriented in no acute distress.  Eye contact is good, speech goal directed, affect calm    Assessment and Plan. The following treatment and monitoring plan is recommended:  ***  There are no diagnoses linked to this " encounter.    Services suggested: { AWV COORDINATION OF SERVICES:20405}  Health Care Screening: Age-appropriate preventive services recommended by USPTF and ACIP covered by Medicare were discussed today. Services ordered if indicated and agreed upon by the patient.  Referrals offered: Community-based lifestyle interventions to reduce health risks and promote self-management and wellness, fall prevention, nutrition, physical activity, tobacco-use cessation, weight loss, and mental health services as per orders if indicated.    Discussion today about general wellness and lifestyle habits:    Prevent falls and reduce trip hazards; Cautioned about securing or removing rugs.  Have a working fire alarm and carbon monoxide detector;   Engage in regular physical activity and social activities     Follow-up: No follow-ups on file.

## 2023-12-29 NOTE — PATIENT INSTRUCTIONS
I recommend the following for your bone density which is osteopenia:  - 1200 mg of calcium daily through diet and/or supplementation (calcium citrate)  - 2000 to 4000 international units of vitamin D daily  - doing exercises that build muscle tone  - Eat at least 2 servings of calcium rich food a day such as yogurt or cottage cheese is recommended.        [facility]  [id number]  [facility] [address]  [phone]

## 2023-12-29 NOTE — PROGRESS NOTES
Chief Complaint   Patient presents with    Annual Wellness Visit       HPI:  Gabriella Mckee is a 78 y.o. here for Medicare Annual Wellness Visit     Patient Active Problem List    Diagnosis Date Noted    Hip pain, acute, right 12/19/2023    Hearing loss 07/18/2023    Tremor 05/11/2023    Left foot pain 03/09/2023    Total knee replacement status, right 01/17/2023    Osteoarthritis, knee 10/19/2022    Kidney stone 05/12/2022    Elevated blood pressure reading 05/12/2022    Raynaud's phenomenon without gangrene 02/10/2022    Uncontrolled type 2 diabetes mellitus with hyperglycemia (HCC) 11/16/2021    Sciatica of left side 11/16/2021    Former smoker 11/01/2021    Spinal stenosis of lumbar region 11/01/2021    Elevated LDL cholesterol level 11/01/2021    Pain of both hip joints 05/13/2019    Moderate episode of recurrent major depressive disorder (HCC) 02/04/2019    Stress incontinence 02/04/2019    Osteopenia of multiple sites 02/04/2019    Panic attacks 07/06/2016    Insomnia 07/06/2016       Current Outpatient Medications   Medication Sig Dispense Refill    gabapentin (NEURONTIN) 100 MG Cap Take 1 Capsule by mouth 3 times a day. 90 Capsule 0    diclofenac sodium (VOLTAREN) 1 % Gel APPLY 2-4 GRAMS TO PAINFUL RIGHT HIP/RIGHT BUTTOCK AREA EVERY 6 HOURS IF NEEDED TO HELP INFLAMMATION. 100 g 1    metFORMIN (GLUCOPHAGE) 500 MG Tab Take 2 tablets by mouth once daily 180 Tablet 0    amoxicillin (AMOXIL) 500 MG Cap TAKE FOUR CAPSULES BY MOUTH ONE HOUR BEFORE APPOINTMENT      acetaminophen (TYLENOL) 500 MG Tab Take 500-1,000 mg by mouth every 6 hours as needed.      fluoxetine (PROZAC) 40 MG capsule Take 40 mg by mouth every day.      alprazolam (XANAX) 0.5 MG Tab Take 0.5 mg by mouth 1 time a day as needed for Anxiety.      mirtazapine (REMERON) 15 MG Tab Take 15 mg by mouth every evening.       No current facility-administered medications for this visit.          Current supplements as per medication list.      Allergies: Patient has no known allergies.    Current social contact/activities:   Working in yard   Walks dog around her 5 acre lot    She  reports that she quit smoking about 47 years ago. Her smoking use included cigarettes. She started smoking about 57 years ago. She has a 10.0 pack-year smoking history. She has never used smokeless tobacco. She reports that she does not currently use alcohol. She reports that she does not use drugs.  Counseling given: Not Answered      ROS:    Gait: Uses no assistive device  Ostomy: No  Other tubes: No  Amputations: No  Chronic oxygen use: No  Last eye exam: was seen this year- follows for retinal exams  Wears hearing aids: No   : Reports urinary leakage during the last 6 months that has not interfered at all with their daily activities or sleep.    Screening:    Depression Screening  Little interest or pleasure in doing things?  0 - not at all  Feeling down, depressed , or hopeless? 0 - not at all  Patient Health Questionnaire Score: 0     If depressive symptoms identified deferred to follow up visit unless specifically addressed in assessment and plan.    Interpretation of PHQ-9 Total Score   Score Severity   1-4 No Depression   5-9 Mild Depression   10-14 Moderate Depression   15-19 Moderately Severe Depression   20-27 Severe Depression    Screening for Cognitive Impairment  Do you or any of your friends or family members have any concern about your memory? No  Three Minute Recall (Banana, Sunrise, Chair) 1/3    Regan clock face with all 12 numbers and set the hands to show 20 past 8.  Yes    Cognitive concerns identified deferred for follow up unless specifically addressed in assessment and plan.    Fall Risk Assessment  Has the patient had two or more falls in the last year or any fall with injury in the last year?  No    Safety Assessment  Do you always wear your seatbelt?  Yes  Any changes to home needed to function safely? No  Difficulty hearing.  Yes  Patient  counseled about all safety risks that were identified.    Functional Assessment ADLs  Are there any barriers preventing you from cooking for yourself or meeting nutritional needs?  No.    Are there any barriers preventing you from driving safely or obtaining transportation?  No.    Are there any barriers preventing you from using a telephone or calling for help?  No    Are there any barriers preventing you from shopping?  No.    Are there any barriers preventing you from taking care of your own finances?  No    Are there any barriers preventing you from managing your medications?  No    Are there any barriers preventing you from showering, bathing or dressing yourself? No    Are there any barriers preventing you from doing housework or laundry? No  Are there any barriers preventing you from using the toilet?No  Are you currently engaging in any exercise or physical activity?  Yes. Little walking    Self-Assessment of Health  What is your perception of your health? Good  Do you sleep more than six hours a night? Yes  In the past 7 days, how much did pain keep you from doing your normal work? None  Do you spend quality time with family or friends (virtually or in person)? Yes  Do you usually eat a heart healthy diet that constists of a variety of fruits, vegetables, whole grains and fiber? No  Do you eat foods high in fat and/or Fast Food more than three times per week? No    Advance Care Planning  Do you have an Advance Directive, Living Will, Durable Power of , or POLST? Yes  Advance Directive Living Will Durable Power of  POLST is not on file - instructed patient to bring in a copy to scan into their chart      Health Maintenance Summary            Postponed - Hepatitis B Vaccine (Hep B) (1 of 3 - Risk 3-dose series) Postponed until 12/19/2024      No completion history exists for this topic.              Postponed - COVID-19 Vaccine (4 - 2023-24 season) Postponed until 12/19/2024 11/19/2021   Imm Admin: MODERNA SARS-COV-2 VACCINE (12+)    03/19/2021  Imm Admin: MODERNA SARS-COV-2 VACCINE (12+)    02/19/2021  Imm Admin: MODERNA SARS-COV-2 VACCINE (12+)              Diabetes: Urine Protein Screening (Yearly) Next due on 5/11/2024 05/11/2023  Microalbumin Creat Ratio Urine (Clinic Collect)              Diabetes: Monofilament / LE Exam (Yearly) Tentatively due on 5/11/2024 05/11/2023  Diabetic Monofilament LE Exam              Diabetes: Retinopathy Screening (Yearly) Next due on 5/16/2024 05/16/2023  AMB EXTERNAL RETINAL SCREENING RESULTS    12/22/2021  REFERRAL FOR RETINAL SCREENING EXAM              A1c Screening (Every 6 Months) Tentatively due on 6/21/2024 12/21/2023  HEMOGLOBIN A1C    07/18/2023  POCT A1C    01/12/2023  POCT A1C    01/06/2023  Hemoglobin A1c    05/10/2022  HEMOGLOBIN A1C    Only the first 5 history entries have been loaded, but more history exists.              Fasting Lipid Profile (Yearly) Tentatively due on 12/21/2024 12/21/2023  Lipid Profile    05/16/2023  Lipid Profile    11/01/2021  Lipid Profile    02/12/2019  Lipid Profile    08/11/2017  LIPID PROFILE    Only the first 5 history entries have been loaded, but more history exists.              SERUM CREATININE (Yearly) Next due on 12/21/2024 12/21/2023  Comp Metabolic Panel    01/06/2023  Basic Metabolic Panel    05/12/2022  COMP METABOLIC PANEL    05/10/2022  COMPREHENSIVE METABOLIC PANEL    05/05/2022  Basic Metabolic Panel    Only the first 5 history entries have been loaded, but more history exists.              Annual Wellness Visit (Every 366 Days) Next due on 12/29/2024 12/29/2023  Level of Service: MS ANNUAL WELLNESS VISIT-INCLUDES PPPS SUBSEQUE*    12/29/2023  Visit Dx: Medicare annual wellness visit, subsequent    11/01/2021  Level of Service: MS ANNUAL WELLNESS VISIT-INCLUDES PPPS SUBSEQUE*    11/01/2021  Visit Dx: Medicare annual wellness visit, subsequent    02/04/2019  Subsequent  Annual Wellness Visit - Includes PPPS ()    Only the first 5 history entries have been loaded, but more history exists.              Bone Density Scan (Every 5 Years) Tentatively due on 2/7/2027 02/07/2022  DS-BONE DENSITY STUDY (DEXA)    02/19/2019  DS-BONE DENSITY STUDY (DEXA)    04/04/2013  DS-BONE DENSITY STUDY (DEXA)              IMM DTaP/Tdap/Td Vaccine (2 - Td or Tdap) Next due on 8/8/2027 08/08/2017  Imm Admin: Tdap Vaccine              Pneumococcal Vaccine: 65+ Years (Series Information) Completed      08/08/2017  Imm Admin: Pneumococcal polysaccharide vaccine (PPSV-23)    10/22/2013  Imm Admin: Pneumococcal Conjugate Vaccine (Prevnar/PCV-13)              Hepatitis C Screening  Tentatively Complete      11/01/2021  Hepatitis C Antibody component of HEP C VIRUS ANTIBODY              Zoster (Shingles) Vaccines (Series Information) Completed      01/12/2022  Imm Admin: Zoster Vaccine Recombinant (RZV) (SHINGRIX)    11/03/2021  Imm Admin: Zoster Vaccine Recombinant (RZV) (SHINGRIX)    06/09/2016  Imm Admin: Zoster Vaccine Live (ZVL) (Zostavax) - HISTORICAL DATA              Influenza Vaccine (Series Information) Completed      12/29/2023  Imm Admin: Influenza Vaccine Adult HD    11/01/2021  Imm Admin: Influenza Vaccine Adult HD    10/22/2013  Imm Admin: Influenza Vaccine Adult HD              Hepatitis A Vaccine (Hep A) (Series Information) Aged Out      No completion history exists for this topic.              HPV Vaccines (Series Information) Aged Out      No completion history exists for this topic.              Polio Vaccine (Inactivated Polio) (Series Information) Aged Out      No completion history exists for this topic.              Meningococcal Immunization (Series Information) Aged Out      No completion history exists for this topic.              Discontinued - Mammogram  Discontinued        Frequency changed to Never automatically (Topic No Longer Applies)    12/03/2021   [id number]    2019  MA-SCREENING MAMMO BILAT W/TOMOSYNTHESIS W/CAD    2016  [id number]              Discontinued - Colorectal Cancer Screening  Discontinued        Frequency changed to Never automatically (Topic No Longer Applies)    2022  REFERRAL TO GI FOR COLONOSCOPY    2022  Colonoscopy (Done)    2016  REFERRAL TO GI FOR COLONOSCOPY                    Patient Care Team:  JOHNATHON Moeller as PCP - General (Nurse Practitioner Primary Care)  Debra Snowden M.D. (Psychiatry & Neurology Psychiatry)        Social History     Tobacco Use    Smoking status: Former     Current packs/day: 0.00     Average packs/day: 1 pack/day for 10.0 years (10.0 ttl pk-yrs)     Types: Cigarettes     Start date: 1966     Quit date: 1976     Years since quittin.5    Smokeless tobacco: Never   Vaping Use    Vaping Use: Never used   Substance Use Topics    Alcohol use: Not Currently     Comment: 1 glass every week or less    Drug use: No     Family History   Problem Relation Age of Onset    Alcohol/Drug Mother     Diabetes Mother      She  has a past medical history of Anxiety, Arthritis, Dental disorder (2023), Depression, Diabetes (HCC) (), Gynecological disorder (), High cholesterol (2023), Pain, Panic attack, Renal disorder (2021), and Stroke ().   Past Surgical History:   Procedure Laterality Date    PB TOTAL KNEE ARTHROPLASTY Right 2023    Procedure: ARTHROPLASTY, KNEE, TOTAL, RIGHT;  Surgeon: Jaden Angeles M.D.;  Location: SURGERY [facility];  Service: Orthopedics    COLONOSCOPY  2022    COLONOSCOPY      abdominal fissure removed    BLOCK EPIDURAL STEROID INJECTION      GANGLION EXCISION      R wrist     TUBAL COAGULATION LAPAROSCOPIC BILATERAL         Exam:   /66 (BP Location: Left arm, Patient Position: Sitting, BP Cuff Size: Adult)   Pulse 89   Temp 36.2 °C (97.2 °F) (Temporal)   Resp 16   Ht 1.753 m (5'  "9\")   Wt 81.3 kg (179 lb 3.2 oz)   SpO2 96%  Body mass index is 26.46 kg/m².    Hearing fair.    Dentition fair  Alert, oriented in no acute distress.  Eye contact is good, speech goal directed, affect calm    Assessment and Plan. The following treatment and monitoring plan is recommended:    Problem   Hip Pain, Acute, Right    Acute and uncomplicated condition   Has been using advil, tylenol and gabapentin and notes improvement   Has been back out in the yard racking   In past has had injections and interested to following up again on these  Denies weakness, worsening popping or clicking of joint     Hearing Loss    Chronic and stable condition   Was seen by audiology- offered hearing aids but too expensive  History of tinnitus in past but not with hearing loss  Denies pain, chills, fevers       Tremor    Chronic and stable condition   Patient does note that she has a tremor to her left arm which is stable and has not bothersome at this time     Left Foot Pain    Chronic and stable condition   causes change in walking   Left gret toe  Walking on side of foot due to pain   Following with at  [facility]     Total Knee Replacement Status, Right    Chronic and stable condition   Patient notes that she had a total knee replacement completed with Dr. Angeles from a renal orthopedic in January 2023.    This seems to be improving and no complications      Osteoarthritis, Knee    Chronic and stable condition   Had surgery completed at [facility]  Right TKA       Elevated Blood Pressure Reading    BP in office 132/66  Does take BP at home - notes lower at home in the 110's-120's     Uncontrolled Type 2 Diabetes Mellitus With Hyperglycemia (Hcc)    Chronic and stable condition     HA1c from 6.9 down to 6.3    Currently controlled with 1000 mg metformin daily, lifestyle, diet and exercise.   Retinal exam completed: Follows up with ophthalmologist tomorrow  LDL: Lipid profile completed 12/2023   ACEi/ARB?  Not " currently on any  Statin?  Refused  Urine Albumin/creatinine: Completed   Tobacco use: None  BP control: Controlled 132/66  Concomitant HTN?  None  A1C 6.5% 12/2023  A1C due: End of June 2024  Last monofilament exam-completed   Patient tolerates medications well with no significant or bothersome side effects.   Denies polyuria, polydipsia, polyphagia.         Sciatica of Left Side    Chronic and stable condition   Patient notes numbness and sciatica after long days of yard work.  She had previously seen a specialist, spinal surgeon for injections but did not that they improved      Former Smoker    Chronic and stable condition   quit smoking at age 30 yo  Was smoking 1 ppd for 13 years       Spinal Stenosis of Lumbar Region    Chronic and stable condition   Tylenol as needed       Elevated Ldl Cholesterol Level    Chronic and stable condition   Patient was previously started on Crestor   She states that she has no plan to get back on a statin at this time.    She does state that she plans to work on her diet and exercise.  Cholesterol 189, tri 187, HDL 51      Pain of Both Hip Joints    Chronic and stable condition   Patient states that her pain is chronic in nature and tends to be on both hips.    She believes this is mostly related to old age versus anything else.      Moderate Episode of Recurrent Major Depressive Disorder (Hcc)    Chronic and stable condition   Takes prozac 40 mg daily   Follow up with psych twice a year     Stress Incontinence    Chronic and stable condition   History of for the last year  Notes it has gotten worse  Wears a pad  Does not do kegel exercises or medications   Is able to tolerate at this time       Osteopenia of Multiple Sites    Chronic and stable condition   2017 osteopenia   2019 showed worsening osteopenia  2022 lumbar spine measures  T-score of -2.1, osteopenia.    left hip measures  T-score of -1.6, compatible with osteopenia.  Is physically active with walking, works  on yard outside daily   Takes OTC vitamin d and calcium     Panic Attacks    Chronic and stable condition   Notes she has not had a full blown panic attack for a long time   Sees psychiatry -  [facility]  Driving a trigger for panic attacks sometimes  Psych manages her xanax, prozac and remeron   Takes 0.5mg alprazolam as needed and prozac 40 mg daily   Follows up with Psych twice yearly   Has been on since 2016       Insomnia    Chronic and stable condition  Patient currently takes 7.5 mg of mirtazapine.    She notes that controlled symptoms with this.            Services suggested: No services needed at this time  Health Care Screening: Age-appropriate preventive services recommended by USPTF and ACIP covered by Medicare were discussed today. Services ordered if indicated and agreed upon by the patient.  Referrals offered: Community-based lifestyle interventions to reduce health risks and promote self-management and wellness, fall prevention, nutrition, physical activity, tobacco-use cessation, weight loss, and mental health services as per orders if indicated.    Discussion today about general wellness and lifestyle habits:    Prevent falls and reduce trip hazards; Cautioned about securing or removing rugs.  Have a working fire alarm and carbon monoxide detector;   Engage in regular physical activity and social activities     Follow-up: Return in about 6 months (around 6/29/2024) for follow up 6 months DM.

## 2024-01-12 DIAGNOSIS — M25.551 HIP PAIN, ACUTE, RIGHT: ICD-10-CM

## 2024-01-12 RX ORDER — GABAPENTIN 100 MG/1
100 CAPSULE ORAL 3 TIMES DAILY
Qty: 90 CAPSULE | Refills: 0 | Status: SHIPPED | OUTPATIENT
Start: 2024-01-12

## 2024-01-28 DIAGNOSIS — E11.65 UNCONTROLLED TYPE 2 DIABETES MELLITUS WITH HYPERGLYCEMIA (HCC): ICD-10-CM

## 2024-01-30 NOTE — TELEPHONE ENCOUNTER
Received request via: Patient    Was the patient seen in the last year in this department? Yes    Does the patient have an active prescription (recently filled or refills available) for medication(s) requested? No    Pharmacy Name: [facility]    Does the patient have intermediate Plus and need 100 day supply (blood pressure, diabetes and cholesterol meds only)? Patient does not have SCP

## 2024-04-18 DIAGNOSIS — E11.65 UNCONTROLLED TYPE 2 DIABETES MELLITUS WITH HYPERGLYCEMIA (HCC): ICD-10-CM

## 2024-04-18 NOTE — TELEPHONE ENCOUNTER
Received request via: Patient    Was the patient seen in the last year in this department? Yes    Does the patient have an active prescription (recently filled or refills available) for medication(s) requested? No    Pharmacy Name: [facility]    Does the patient have custodial Plus and need 100 day supply (blood pressure, diabetes and cholesterol meds only)? Patient does not have SCP

## 2024-06-07 ENCOUNTER — HOSPITAL ENCOUNTER (OUTPATIENT)
Facility: MEDICAL CENTER | Age: 79
End: 2024-06-07
Attending: NURSE PRACTITIONER
Payer: MEDICARE

## 2024-06-07 ENCOUNTER — OFFICE VISIT (OUTPATIENT)
Dept: MEDICAL GROUP | Facility: PHYSICIAN GROUP | Age: 79
End: 2024-06-07
Payer: MEDICARE

## 2024-06-07 VITALS
SYSTOLIC BLOOD PRESSURE: 128 MMHG | DIASTOLIC BLOOD PRESSURE: 68 MMHG | OXYGEN SATURATION: 99 % | RESPIRATION RATE: 18 BRPM | HEIGHT: 69 IN | WEIGHT: 173.8 LBS | BODY MASS INDEX: 25.74 KG/M2 | TEMPERATURE: 95.9 F | HEART RATE: 86 BPM

## 2024-06-07 DIAGNOSIS — E11.65 UNCONTROLLED TYPE 2 DIABETES MELLITUS WITH HYPERGLYCEMIA (HCC): ICD-10-CM

## 2024-06-07 DIAGNOSIS — R42 DIZZINESS ON STANDING: ICD-10-CM

## 2024-06-07 LAB
CREAT UR-MCNC: 124.15 MG/DL
MICROALBUMIN UR-MCNC: <1.2 MG/DL
MICROALBUMIN/CREAT UR: NORMAL MG/G (ref 0–30)

## 2024-06-07 PROCEDURE — 82043 UR ALBUMIN QUANTITATIVE: CPT

## 2024-06-07 PROCEDURE — 3074F SYST BP LT 130 MM HG: CPT | Performed by: NURSE PRACTITIONER

## 2024-06-07 PROCEDURE — 82570 ASSAY OF URINE CREATININE: CPT

## 2024-06-07 PROCEDURE — 99214 OFFICE O/P EST MOD 30 MIN: CPT | Performed by: NURSE PRACTITIONER

## 2024-06-07 PROCEDURE — 3078F DIAST BP <80 MM HG: CPT | Performed by: NURSE PRACTITIONER

## 2024-06-07 ASSESSMENT — ENCOUNTER SYMPTOMS
BLURRED VISION: 0
ABDOMINAL PAIN: 0
NERVOUS/ANXIOUS: 1
VOMITING: 0
DIARRHEA: 0
NAUSEA: 0
CONSTIPATION: 0
WEIGHT LOSS: 0
DEPRESSION: 0
CARDIOVASCULAR NEGATIVE: 1
PALPITATIONS: 0
HEADACHES: 0
SHORTNESS OF BREATH: 0
FEVER: 0
CHILLS: 0
DIZZINESS: 1

## 2024-06-07 ASSESSMENT — PATIENT HEALTH QUESTIONNAIRE - PHQ9: CLINICAL INTERPRETATION OF PHQ2 SCORE: 0

## 2024-06-07 ASSESSMENT — FIBROSIS 4 INDEX: FIB4 SCORE: 2.11

## 2024-06-07 NOTE — PROGRESS NOTES
Verbal consent was acquired by the patient to use AirPair ambient listening note generation during this visit     CC:  Chief Complaint   Patient presents with    Follow-Up     Vertigo X 4 days, nausea, H/A, pt states it felt like a panic attack        Gabriella Mckee 78 y.o. female  patient presenting for     History of Present Illness  The patient is a 78-year-old female presenting today for a follow-up consultation concerning symptoms of vertigo, headaches, nausea, and a sensation akin to having a panic attack.    Dizziness- orthostatic  The patient's symptoms initiated last Tuesday when she awoke with a sensation of the room spinning. She attempted to rise slowly, turn off the heat, turn on the light, and feed her dog, but experienced severe dizziness upon rising from bed. The dizziness was intermittent, but subsequently induced diarrhea and panic attacks, leading her to question the possibility of having panic attacks. She is currently on medication for these symptoms. . Despite avoiding head elevation and downward movements, her dizziness seemed to improve over the past 2 to 3 days. Currently, she is not dizzy, but experiences a mild headache immediately after these episodes. Her fluid intake consists of 2 bottles of water and 2 cups of coffee in the morning. These episodes typically last between 10 to 20 minutes, and she finds relief by sitting down and lying on her side. She monitors her blood pressure at home, with recent readings ranging from 110's.  Xanax provides some relief for her anxiety and dizziness but not for her headaches.      Review of Systems   Constitutional:  Negative for chills, fever, malaise/fatigue and weight loss.   Eyes:  Negative for blurred vision.   Respiratory:  Negative for shortness of breath.    Cardiovascular: Negative.  Negative for chest pain, palpitations and leg swelling.   Gastrointestinal:  Negative for abdominal pain, constipation, diarrhea, nausea and vomiting.  "  Neurological:  Positive for dizziness. Negative for headaches.   Psychiatric/Behavioral:  Negative for depression. The patient is nervous/anxious.        /68   Pulse 86   Temp (!) 35.5 °C (95.9 °F) (Temporal)   Resp 18   Ht 1.753 m (5' 9\")   Wt 78.8 kg (173 lb 12.8 oz)   SpO2 99% , Body mass index is 25.67 kg/m².    Physical Exam  Constitutional:       Appearance: Normal appearance.   HENT:      Head: Normocephalic and atraumatic.   Cardiovascular:      Rate and Rhythm: Normal rate and regular rhythm.      Pulses: Normal pulses.      Heart sounds: Normal heart sounds.   Pulmonary:      Effort: Pulmonary effort is normal.      Breath sounds: Normal breath sounds.   Musculoskeletal:         General: Normal range of motion.   Skin:     General: Skin is warm and dry.   Neurological:      Mental Status: She is alert and oriented to person, place, and time.   Psychiatric:         Mood and Affect: Mood normal.         Behavior: Behavior normal.         Thought Content: Thought content normal.         Judgment: Judgment normal.           Results  .    Assessment and Plan    Assessment & Plan        1. Uncontrolled type 2 diabetes mellitus with hyperglycemia (HCC)  Chronic and stable condition   - Microalbumin Creat Ratio Urine (Clinic Collect); Future  - Diabetic Monofilament LE Exam  Monofilament testing with a 10 gram force: sensation intact: intact bilaterally  Visual Inspection: Feet without maceration, ulcers, fissures.  Pedal pulses: intact bilaterally    2. Dizziness on standing  Acute and uncomplicated condition   The patient's blood pressure was slightly elevated while in a supine position, but showed improvement upon sitting and standing up but never became hypotensive pr dizzy during testing. The symptoms suggest a combination of anxiety and dehydration. The patient is advised to increase her fluid intake to consume less than 80 ounces of water daily and more when exposed to heat.     Discussed with " patient possible alternative diagnoses, patient is to take all medications as prescribed.     If symptoms persist FU w/PCP, if symptoms worsen go to emergency room.     If experiencing any side effects from prescribed medications reports to the office immediately or go to emergency room.    Reviewed indication, dosage, usage and potential adverse effects of prescribed medications.     Reviewed risks and benefits of treatment plan. Patient verbalizes understanding of all instruction and verbally agrees to plan.    Return in about 1 week (around 6/14/2024) for follow up dizziness.    This note was created using voice recognition software (Face to Face Live). The accuracy of the dictation is limited by the abilities of the software. I have reviewed the note prior to signing, however some errors in grammar and context are still possible. If you have any questions related to this note please do not hesitate to contact our office.

## 2024-06-21 ENCOUNTER — OFFICE VISIT (OUTPATIENT)
Dept: MEDICAL GROUP | Facility: PHYSICIAN GROUP | Age: 79
End: 2024-06-21
Payer: MEDICARE

## 2024-06-21 VITALS
SYSTOLIC BLOOD PRESSURE: 118 MMHG | DIASTOLIC BLOOD PRESSURE: 78 MMHG | TEMPERATURE: 98.8 F | HEART RATE: 70 BPM | RESPIRATION RATE: 16 BRPM | WEIGHT: 173 LBS | HEIGHT: 69 IN | BODY MASS INDEX: 25.62 KG/M2 | OXYGEN SATURATION: 97 %

## 2024-06-21 DIAGNOSIS — J01.10 ACUTE NON-RECURRENT FRONTAL SINUSITIS: ICD-10-CM

## 2024-06-21 DIAGNOSIS — R42 VERTIGO: ICD-10-CM

## 2024-06-21 DIAGNOSIS — E11.65 UNCONTROLLED TYPE 2 DIABETES MELLITUS WITH HYPERGLYCEMIA (HCC): ICD-10-CM

## 2024-06-21 LAB
HBA1C MFR BLD: 6.7 % (ref ?–5.8)
POCT INT CON NEG: NEGATIVE
POCT INT CON POS: POSITIVE

## 2024-06-21 PROCEDURE — 3074F SYST BP LT 130 MM HG: CPT | Performed by: NURSE PRACTITIONER

## 2024-06-21 PROCEDURE — 99214 OFFICE O/P EST MOD 30 MIN: CPT | Performed by: NURSE PRACTITIONER

## 2024-06-21 PROCEDURE — 3078F DIAST BP <80 MM HG: CPT | Performed by: NURSE PRACTITIONER

## 2024-06-21 PROCEDURE — 83036 HEMOGLOBIN GLYCOSYLATED A1C: CPT | Performed by: NURSE PRACTITIONER

## 2024-06-21 RX ORDER — FEXOFENADINE HCL 60 MG/1
60 TABLET, FILM COATED ORAL DAILY
Qty: 30 TABLET | Refills: 0 | Status: SHIPPED | OUTPATIENT
Start: 2024-06-21

## 2024-06-21 RX ORDER — AMOXICILLIN AND CLAVULANATE POTASSIUM 875; 125 MG/1; MG/1
1 TABLET, FILM COATED ORAL 2 TIMES DAILY
Qty: 14 TABLET | Refills: 0 | Status: SHIPPED | OUTPATIENT
Start: 2024-06-21

## 2024-06-21 ASSESSMENT — ENCOUNTER SYMPTOMS
EYE DISCHARGE: 1
CHILLS: 0
COUGH: 1
WEIGHT LOSS: 0
DIZZINESS: 1
SHORTNESS OF BREATH: 0
PALPITATIONS: 0
FEVER: 0
SINUS PAIN: 1
SORE THROAT: 0
SPUTUM PRODUCTION: 1

## 2024-06-21 ASSESSMENT — FIBROSIS 4 INDEX: FIB4 SCORE: 2.11

## 2024-06-21 NOTE — PROGRESS NOTES
"Verbal consent was acquired by the patient to use [employer] ambient listening note generation during this visit     CC:  Chief Complaint   Patient presents with    Follow-Up     Pt coming for vertigo been getting better, possible sinus infection, coughing yellow-greenish flam, headaches, x 2 weeks         Gabriella Mckee 78 y.o. female  patient presenting for     History of Present Illness  The patient is a 78-year-old female presenting for a follow-up consultation regarding her cough, rhinorrhea, and ocular discharge.    URI  The patient began experiencing a cough last Monday, accompanied by rhinorrhea and ocular discharge. She denies experiencing chills, fevers, chest pain, or dyspnea. Her cough is productive, yielding yellow-green sputum, and she reports increased nasal discharge of white sputum when blowing her nose. She queries whether her symptoms are indicative of allergies.    vertigo  The patient also reports an exacerbation of her vertigo symptoms, which have been intermittent for the past 5 years. This condition necessitated a brief period of bed rest for 2 days. She expresses discomfort when elevating her head, which she believes contributes to her panic attacks. She expresses interest in consulting with an Ear, Nose, and Throat (ENT) specialist.          Review of Systems   Constitutional:  Negative for chills, fever, malaise/fatigue and weight loss.   HENT:  Positive for congestion and sinus pain. Negative for sore throat.    Eyes:  Positive for discharge.   Respiratory:  Positive for cough and sputum production. Negative for shortness of breath.    Cardiovascular:  Negative for chest pain and palpitations.   Neurological:  Positive for dizziness.       /78 (BP Location: Right arm, Patient Position: Sitting, BP Cuff Size: Adult)   Pulse 70   Temp 37.1 °C (98.8 °F) (Temporal)   Resp 16   Ht 1.753 m (5' 9\")   Wt 78.5 kg (173 lb)   SpO2 97% , Body mass index is 25.55 kg/m².    Physical " Exam  Constitutional:       Appearance: Normal appearance.   HENT:      Head: Normocephalic and atraumatic.      Right Ear: Tympanic membrane, ear canal and external ear normal.      Left Ear: Tympanic membrane, ear canal and external ear normal.      Nose: Congestion present.      Mouth/Throat:      Mouth: Mucous membranes are moist.      Pharynx: Oropharynx is clear. No oropharyngeal exudate or posterior oropharyngeal erythema.   Eyes:      Extraocular Movements: Extraocular movements intact.      Conjunctiva/sclera: Conjunctivae normal.      Pupils: Pupils are equal, round, and reactive to light.   Cardiovascular:      Rate and Rhythm: Normal rate and regular rhythm.      Pulses: Normal pulses.      Heart sounds: Normal heart sounds.   Pulmonary:      Effort: Pulmonary effort is normal. No respiratory distress.      Breath sounds: Normal breath sounds. No stridor. No wheezing, rhonchi or rales.   Skin:     General: Skin is warm and dry.   Neurological:      Mental Status: She is alert and oriented to person, place, and time.   Psychiatric:         Mood and Affect: Mood normal.         Behavior: Behavior normal.         Thought Content: Thought content normal.         Judgment: Judgment normal.           Results  Laboratory Studies 6/21/2024  Hemoglobin A1c is 6.7.    Assessment and Plan    Assessment & Plan         1. Uncontrolled type 2 diabetes mellitus with hyperglycemia (HCC)  Chronic and stable condition  - POCT A1C    2. Acute non-recurrent frontal sinusitis  Acute uncomplicated condition  - amoxicillin-clavulanate (AUGMENTIN) 875-125 MG Tab; Take 1 Tablet by mouth 2 times a day.  Dispense: 14 Tablet; Refill: 0  - fexofenadine (ALLEGRA) 60 MG Tab; Take 1 Tablet by mouth every day.  Dispense: 30 Tablet; Refill: 0    3. Vertigo  Chronic and stable condition  - fexofenadine (ALLEGRA) 60 MG Tab; Take 1 Tablet by mouth every day.  Dispense: 30 Tablet; Refill: 0  - Referral to ENT           Discussed with patient  possible alternative diagnoses, patient is to take all medications as prescribed.     If symptoms persist FU w/PCP, if symptoms worsen go to emergency room.     If experiencing any side effects from prescribed medications reports to the office immediately or go to emergency room.    Reviewed indication, dosage, usage and potential adverse effects of prescribed medications.     Reviewed risks and benefits of treatment plan. Patient verbalizes understanding of all instruction and verbally agrees to plan.    Return for as needed.    This note was created using voice recognition software ([employer]). The accuracy of the dictation is limited by the abilities of the software. I have reviewed the note prior to signing, however some errors in grammar and context are still possible. If you have any questions related to this note please do not hesitate to contact our office.

## 2024-07-18 DIAGNOSIS — E11.65 UNCONTROLLED TYPE 2 DIABETES MELLITUS WITH HYPERGLYCEMIA (HCC): ICD-10-CM

## 2024-10-13 DIAGNOSIS — E11.65 UNCONTROLLED TYPE 2 DIABETES MELLITUS WITH HYPERGLYCEMIA (HCC): ICD-10-CM

## 2025-01-08 DIAGNOSIS — E11.65 UNCONTROLLED TYPE 2 DIABETES MELLITUS WITH HYPERGLYCEMIA (HCC): ICD-10-CM

## 2025-02-10 SDOH — ECONOMIC STABILITY: FOOD INSECURITY: WITHIN THE PAST 12 MONTHS, THE FOOD YOU BOUGHT JUST DIDN'T LAST AND YOU DIDN'T HAVE MONEY TO GET MORE.: NEVER TRUE

## 2025-02-10 SDOH — HEALTH STABILITY: MENTAL HEALTH
STRESS IS WHEN SOMEONE FEELS TENSE, NERVOUS, ANXIOUS, OR CAN'T SLEEP AT NIGHT BECAUSE THEIR MIND IS TROUBLED. HOW STRESSED ARE YOU?: RATHER MUCH

## 2025-02-10 SDOH — ECONOMIC STABILITY: FOOD INSECURITY: WITHIN THE PAST 12 MONTHS, YOU WORRIED THAT YOUR FOOD WOULD RUN OUT BEFORE YOU GOT MONEY TO BUY MORE.: NEVER TRUE

## 2025-02-10 SDOH — HEALTH STABILITY: PHYSICAL HEALTH: ON AVERAGE, HOW MANY DAYS PER WEEK DO YOU ENGAGE IN MODERATE TO STRENUOUS EXERCISE (LIKE A BRISK WALK)?: 4 DAYS

## 2025-02-10 SDOH — ECONOMIC STABILITY: INCOME INSECURITY: HOW HARD IS IT FOR YOU TO PAY FOR THE VERY BASICS LIKE FOOD, HOUSING, MEDICAL CARE, AND HEATING?: NOT HARD AT ALL

## 2025-02-10 SDOH — ECONOMIC STABILITY: INCOME INSECURITY: IN THE LAST 12 MONTHS, WAS THERE A TIME WHEN YOU WERE NOT ABLE TO PAY THE MORTGAGE OR RENT ON TIME?: NO

## 2025-02-10 SDOH — ECONOMIC STABILITY: HOUSING INSECURITY
IN THE LAST 12 MONTHS, WAS THERE A TIME WHEN YOU DID NOT HAVE A STEADY PLACE TO SLEEP OR SLEPT IN A SHELTER (INCLUDING NOW)?: NO

## 2025-02-10 SDOH — HEALTH STABILITY: PHYSICAL HEALTH: ON AVERAGE, HOW MANY MINUTES DO YOU ENGAGE IN EXERCISE AT THIS LEVEL?: 50 MIN

## 2025-02-10 SDOH — ECONOMIC STABILITY: TRANSPORTATION INSECURITY
IN THE PAST 12 MONTHS, HAS THE LACK OF TRANSPORTATION KEPT YOU FROM MEDICAL APPOINTMENTS OR FROM GETTING MEDICATIONS?: NO

## 2025-02-10 SDOH — ECONOMIC STABILITY: TRANSPORTATION INSECURITY
IN THE PAST 12 MONTHS, HAS LACK OF RELIABLE TRANSPORTATION KEPT YOU FROM MEDICAL APPOINTMENTS, MEETINGS, WORK OR FROM GETTING THINGS NEEDED FOR DAILY LIVING?: NO

## 2025-02-10 SDOH — ECONOMIC STABILITY: TRANSPORTATION INSECURITY
IN THE PAST 12 MONTHS, HAS LACK OF TRANSPORTATION KEPT YOU FROM MEETINGS, WORK, OR FROM GETTING THINGS NEEDED FOR DAILY LIVING?: NO

## 2025-02-10 ASSESSMENT — SOCIAL DETERMINANTS OF HEALTH (SDOH)
HOW OFTEN DO YOU ATTENT MEETINGS OF THE CLUB OR ORGANIZATION YOU BELONG TO?: NEVER
HOW OFTEN DO YOU HAVE SIX OR MORE DRINKS ON ONE OCCASION: NEVER
HOW OFTEN DO YOU HAVE A DRINK CONTAINING ALCOHOL: 2-4 TIMES A MONTH
HOW OFTEN DO YOU ATTEND CHURCH OR RELIGIOUS SERVICES?: NEVER
HOW OFTEN DO YOU GET TOGETHER WITH FRIENDS OR RELATIVES?: PATIENT DECLINED
IN A TYPICAL WEEK, HOW MANY TIMES DO YOU TALK ON THE PHONE WITH FAMILY, FRIENDS, OR NEIGHBORS?: NEVER
HOW OFTEN DO YOU ATTENT MEETINGS OF THE CLUB OR ORGANIZATION YOU BELONG TO?: NEVER
HOW OFTEN DO YOU GET TOGETHER WITH FRIENDS OR RELATIVES?: PATIENT DECLINED
WITHIN THE PAST 12 MONTHS, YOU WORRIED THAT YOUR FOOD WOULD RUN OUT BEFORE YOU GOT THE MONEY TO BUY MORE: NEVER TRUE
IN A TYPICAL WEEK, HOW MANY TIMES DO YOU TALK ON THE PHONE WITH FAMILY, FRIENDS, OR NEIGHBORS?: NEVER
HOW OFTEN DO YOU ATTEND CHURCH OR RELIGIOUS SERVICES?: NEVER
DO YOU BELONG TO ANY CLUBS OR ORGANIZATIONS SUCH AS CHURCH GROUPS UNIONS, FRATERNAL OR ATHLETIC GROUPS, OR SCHOOL GROUPS?: NO
HOW HARD IS IT FOR YOU TO PAY FOR THE VERY BASICS LIKE FOOD, HOUSING, MEDICAL CARE, AND HEATING?: NOT HARD AT ALL
HOW MANY DRINKS CONTAINING ALCOHOL DO YOU HAVE ON A TYPICAL DAY WHEN YOU ARE DRINKING: 1 OR 2
IN THE PAST 12 MONTHS, HAS THE ELECTRIC, GAS, OIL, OR WATER COMPANY THREATENED TO SHUT OFF SERVICE IN YOUR HOME?: NO
DO YOU BELONG TO ANY CLUBS OR ORGANIZATIONS SUCH AS CHURCH GROUPS UNIONS, FRATERNAL OR ATHLETIC GROUPS, OR SCHOOL GROUPS?: NO

## 2025-02-10 ASSESSMENT — LIFESTYLE VARIABLES
HOW OFTEN DO YOU HAVE A DRINK CONTAINING ALCOHOL: 2-4 TIMES A MONTH
HOW MANY STANDARD DRINKS CONTAINING ALCOHOL DO YOU HAVE ON A TYPICAL DAY: 1 OR 2
SKIP TO QUESTIONS 9-10: 1
AUDIT-C TOTAL SCORE: 2
HOW OFTEN DO YOU HAVE SIX OR MORE DRINKS ON ONE OCCASION: NEVER

## 2025-02-13 ENCOUNTER — APPOINTMENT (OUTPATIENT)
Dept: MEDICAL GROUP | Facility: PHYSICIAN GROUP | Age: 80
End: 2025-02-13
Payer: MEDICARE

## 2025-02-13 VITALS
WEIGHT: 174.8 LBS | HEIGHT: 69 IN | SYSTOLIC BLOOD PRESSURE: 110 MMHG | RESPIRATION RATE: 16 BRPM | OXYGEN SATURATION: 95 % | DIASTOLIC BLOOD PRESSURE: 70 MMHG | BODY MASS INDEX: 25.89 KG/M2 | TEMPERATURE: 96.7 F | HEART RATE: 83 BPM

## 2025-02-13 DIAGNOSIS — N20.0 KIDNEY STONE: ICD-10-CM

## 2025-02-13 DIAGNOSIS — E78.00 ELEVATED LDL CHOLESTEROL LEVEL: ICD-10-CM

## 2025-02-13 DIAGNOSIS — M25.551 HIP PAIN, ACUTE, RIGHT: ICD-10-CM

## 2025-02-13 DIAGNOSIS — E11.65 UNCONTROLLED TYPE 2 DIABETES MELLITUS WITH HYPERGLYCEMIA (HCC): ICD-10-CM

## 2025-02-13 DIAGNOSIS — M79.672 LEFT FOOT PAIN: ICD-10-CM

## 2025-02-13 DIAGNOSIS — Z96.651 STATUS POST TOTAL RIGHT KNEE REPLACEMENT: ICD-10-CM

## 2025-02-13 DIAGNOSIS — E11.9 TYPE 2 DIABETES MELLITUS WITHOUT COMPLICATION, WITHOUT LONG-TERM CURRENT USE OF INSULIN (HCC): ICD-10-CM

## 2025-02-13 DIAGNOSIS — I73.00 RAYNAUD'S PHENOMENON WITHOUT GANGRENE: ICD-10-CM

## 2025-02-13 DIAGNOSIS — Z87.891 FORMER SMOKER: ICD-10-CM

## 2025-02-13 DIAGNOSIS — M25.552 PAIN OF BOTH HIP JOINTS: ICD-10-CM

## 2025-02-13 DIAGNOSIS — M85.89 OSTEOPENIA OF MULTIPLE SITES: ICD-10-CM

## 2025-02-13 DIAGNOSIS — M25.551 PAIN OF BOTH HIP JOINTS: ICD-10-CM

## 2025-02-13 DIAGNOSIS — F33.1 MODERATE EPISODE OF RECURRENT MAJOR DEPRESSIVE DISORDER (HCC): ICD-10-CM

## 2025-02-13 DIAGNOSIS — H91.90 HEARING LOSS, UNSPECIFIED HEARING LOSS TYPE, UNSPECIFIED LATERALITY: ICD-10-CM

## 2025-02-13 DIAGNOSIS — M47.816 LUMBAR SPONDYLOSIS: ICD-10-CM

## 2025-02-13 DIAGNOSIS — F41.0 PANIC ATTACKS: ICD-10-CM

## 2025-02-13 DIAGNOSIS — G47.00 INSOMNIA, UNSPECIFIED TYPE: ICD-10-CM

## 2025-02-13 DIAGNOSIS — M54.32 SCIATICA OF LEFT SIDE: ICD-10-CM

## 2025-02-13 DIAGNOSIS — N39.3 STRESS INCONTINENCE: ICD-10-CM

## 2025-02-13 PROBLEM — G89.29 CHRONIC PAIN OF RIGHT HIP: Status: ACTIVE | Noted: 2023-12-19

## 2025-02-13 PROBLEM — R03.0 ELEVATED BLOOD PRESSURE READING: Status: RESOLVED | Noted: 2022-05-12 | Resolved: 2025-02-13

## 2025-02-13 LAB
HBA1C MFR BLD: 7 % (ref ?–5.8)
POCT INT CON NEG: NEGATIVE
POCT INT CON POS: POSITIVE

## 2025-02-13 PROCEDURE — 3078F DIAST BP <80 MM HG: CPT | Performed by: NURSE PRACTITIONER

## 2025-02-13 PROCEDURE — 83036 HEMOGLOBIN GLYCOSYLATED A1C: CPT | Performed by: NURSE PRACTITIONER

## 2025-02-13 PROCEDURE — G0439 PPPS, SUBSEQ VISIT: HCPCS | Performed by: NURSE PRACTITIONER

## 2025-02-13 PROCEDURE — 3074F SYST BP LT 130 MM HG: CPT | Performed by: NURSE PRACTITIONER

## 2025-02-13 RX ORDER — AMOXICILLIN 500 MG/1
CAPSULE ORAL
COMMUNITY
Start: 2025-02-01 | End: 2025-02-13

## 2025-02-13 ASSESSMENT — PATIENT HEALTH QUESTIONNAIRE - PHQ9
SUM OF ALL RESPONSES TO PHQ QUESTIONS 1-9: 0
9. THOUGHTS THAT YOU WOULD BE BETTER OFF DEAD, OR OF HURTING YOURSELF: NOT AT ALL
6. FEELING BAD ABOUT YOURSELF - OR THAT YOU ARE A FAILURE OR HAVE LET YOURSELF OR YOUR FAMILY DOWN: NOT AL ALL
7. TROUBLE CONCENTRATING ON THINGS, SUCH AS READING THE NEWSPAPER OR WATCHING TELEVISION: NOT AT ALL
SUM OF ALL RESPONSES TO PHQ9 QUESTIONS 1 AND 2: 0
5. POOR APPETITE OR OVEREATING: NOT AT ALL
2. FEELING DOWN, DEPRESSED, IRRITABLE, OR HOPELESS: NOT AT ALL
8. MOVING OR SPEAKING SO SLOWLY THAT OTHER PEOPLE COULD HAVE NOTICED. OR THE OPPOSITE, BEING SO FIGETY OR RESTLESS THAT YOU HAVE BEEN MOVING AROUND A LOT MORE THAN USUAL: NOT AT ALL
1. LITTLE INTEREST OR PLEASURE IN DOING THINGS: NOT AT ALL
3. TROUBLE FALLING OR STAYING ASLEEP OR SLEEPING TOO MUCH: NOT AT ALL
4. FEELING TIRED OR HAVING LITTLE ENERGY: NOT AT ALL
CLINICAL INTERPRETATION OF PHQ2 SCORE: 0

## 2025-02-13 ASSESSMENT — ACTIVITIES OF DAILY LIVING (ADL): BATHING_REQUIRES_ASSISTANCE: 0

## 2025-02-13 ASSESSMENT — ENCOUNTER SYMPTOMS: GENERAL WELL-BEING: GOOD

## 2025-02-13 NOTE — PROGRESS NOTES
Chief Complaint   Patient presents with    Annual Wellness Visit       HPI:  Gabriella Mckee is a 79 y.o. here for Medicare Annual Wellness Visit     Patient Active Problem List    Diagnosis Date Noted    Lumbar spondylosis 01/21/2024    Chronic pain of right hip 12/19/2023    Hearing loss 07/18/2023    Tremor 05/11/2023    Left foot pain 03/09/2023    Status post total right knee replacement 10/19/2022    Type 2 diabetes mellitus without complication, without long-term current use of insulin (HCC) 11/16/2021    Sciatica of left side 11/16/2021    Former smoker 11/01/2021    Spinal stenosis of lumbar region 11/01/2021    Elevated LDL cholesterol level 11/01/2021    Pain of both hip joints 05/13/2019    Moderate episode of recurrent major depressive disorder (HCC) 02/04/2019    Stress incontinence 02/04/2019    Osteopenia of multiple sites 02/04/2019    Panic attacks 07/06/2016    Insomnia 07/06/2016       Current Outpatient Medications   Medication Sig Dispense Refill    metFORMIN (GLUCOPHAGE) 500 MG Tab Take 2 tablets by mouth once daily 180 Tablet 0    fexofenadine (ALLEGRA) 60 MG Tab Take 1 Tablet by mouth every day. 30 Tablet 0    gabapentin (NEURONTIN) 100 MG Cap TAKE 1 CAPSULE BY MOUTH THREE TIMES DAILY 90 Capsule 0    acetaminophen (TYLENOL) 500 MG Tab Take 500-1,000 mg by mouth every 6 hours as needed.      fluoxetine (PROZAC) 40 MG capsule Take 40 mg by mouth every day.      alprazolam (XANAX) 0.5 MG Tab Take 0.5 mg by mouth 1 time a day as needed for Anxiety.      mirtazapine (REMERON) 15 MG Tab Take 15 mg by mouth every evening.      amoxicillin-clavulanate (AUGMENTIN) 875-125 MG Tab Take 1 Tablet by mouth 2 times a day. 14 Tablet 0     No current facility-administered medications for this visit.          Current supplements as per medication list.     Allergies: Patient has no known allergies.    Current social contact/activities:   Works in her yard and walks dog a lot  Works on her 5 acre lot      She  reports that she quit smoking about 48 years ago. Her smoking use included cigarettes. She started smoking about 58 years ago. She has a 10 pack-year smoking history. She has never used smokeless tobacco. She reports that she does not currently use alcohol. She reports that she does not use drugs.  Counseling given: Not Answered      ROS:    Gait: Uses no assistive device  Ostomy: No  Other tubes: No  Amputations: No  Chronic oxygen use: No  Last eye exam: 1 year ago due in 5/2025   Wears hearing aids: No - hard of hearing  : Reports urinary leakage during the last 6 months that has somewhat interfered with their daily activities or sleep.    Screening:    Depression Screening  Little interest or pleasure in doing things?  0 - not at all  Feeling down, depressed , or hopeless? 0 - not at all  Patient Health Questionnaire Score: 0     If depressive symptoms identified deferred to follow up visit unless specifically addressed in assessment and plan.    Interpretation of PHQ-9 Total Score   Score Severity   1-4 No Depression   5-9 Mild Depression   10-14 Moderate Depression   15-19 Moderately Severe Depression   20-27 Severe Depression    Screening for Cognitive Impairment  Do you or any of your friends or family members have any concern about your memory? No  Three Minute Recall (Leader, Season, Table) 3/3    Regan clock face with all 12 numbers and set the hands to show 10 minutes after 11.  Yes    Cognitive concerns identified deferred for follow up unless specifically addressed in assessment and plan.    Fall Risk Assessment  Has the patient had two or more falls in the last year or any fall with injury in the last year?  No    Safety Assessment  Do you always wear your seatbelt?  Yes  Any changes to home needed to function safely? No  Difficulty hearing.  Yes  Patient counseled about all safety risks that were identified.    Functional Assessment ADLs  Are there any barriers preventing you from cooking  for yourself or meeting nutritional needs?  No.    Are there any barriers preventing you from driving safely or obtaining transportation?  No.    Are there any barriers preventing you from using a telephone or calling for help?  No    Are there any barriers preventing you from shopping?  No.    Are there any barriers preventing you from taking care of your own finances?  No    Are there any barriers preventing you from managing your medications?  No    Are there any barriers preventing you from showering, bathing or dressing yourself? No    Are there any barriers preventing you from doing housework or laundry? No  Are there any barriers preventing you from using the toilet?No  Are you currently engaging in any exercise or physical activity?  Yes. walk    Self-Assessment of Health  What is your perception of your health? Good    Do you sleep more than six hours a night? No    In the past 7 days, how much did pain keep you from doing your normal work? None    Do you spend quality time with family or friends (virtually or in person)? Yes    Do you usually eat a heart healthy diet that constists of a variety of fruits, vegetables, whole grains and fiber? Yes    Do you eat foods high in fat and/or Fast Food more than three times per week? No    How concerned are you that your medical conditions are not being well managed? Not at all    Are you worried that in the next 2 months, you may not have stable housing that you own, rent, or stay in as part of a household? No      Advance Care Planning  Do you have an Advance Directive, Living Will, Durable Power of , or POLST? Yes    Living Will Durable Power of    is not on file - instructed patient to bring in a copy to scan into their chart      Health Maintenance Summary            Current Care Gaps       Annual Wellness Visit (Yearly) Overdue since 12/29/2024 12/29/2023  Visit Dx: Medicare annual wellness visit, subsequent    12/29/2023  Level of  Service: ND ANNUAL WELLNESS VISIT-INCLUDES PPPS SUBSEQUE*    11/01/2021  Visit Dx: Medicare annual wellness visit, subsequent    11/01/2021  Level of Service: ND ANNUAL WELLNESS VISIT-INCLUDES PPPS SUBSEQUE*    02/04/2019  Subsequent Annual Wellness Visit - Includes PPPS ()     Only the first 5 history entries have been loaded, but more history exists.                    Needs Review       Hepatitis C Screening  Tentatively Complete      11/01/2021  Hepatitis C Antibody component of HEP C VIRUS ANTIBODY              A1c Screening (Every 6 Months) Tentatively due on 8/13/2025 02/13/2025  POCT Hemoglobin A1C    06/21/2024  POCT A1C    12/21/2023  HEMOGLOBIN A1C    07/18/2023  POCT A1C    01/12/2023  POCT A1C      Only the first 5 history entries have been loaded, but more history exists.              Bone Density Scan (Every 5 Years) Tentatively due on 2/7/2027 02/07/2022  DS-BONE DENSITY STUDY (DEXA)    02/19/2019  DS-BONE DENSITY STUDY (DEXA)    04/04/2013  DS-BONE DENSITY STUDY (DEXA)                      Awaiting Completion       Fasting Lipid Profile (Yearly) Order placed this encounter      02/13/2025  Order placed for Lipid Profile by RACHEL MoellerP.R.N.    12/21/2023  Lipid Profile    05/16/2023  Lipid Profile    11/01/2021  Lipid Profile    02/12/2019  Lipid Profile      Only the first 5 history entries have been loaded, but more history exists.              SERUM CREATININE (Yearly) Order placed this encounter      02/13/2025  Order placed for Comp Metabolic Panel by AVRIL Moeller.P.R.N.    12/21/2023  Comp Metabolic Panel    01/06/2023  Basic Metabolic Panel    05/12/2022  COMP METABOLIC PANEL    05/10/2022  COMP METABOLIC PANEL      Only the first 5 history entries have been loaded, but more history exists.                      Upcoming       COVID-19 Vaccine (4 - 2024-25 season) Postponed until 2/13/2026 11/19/2021  Imm Admin: MODERNA SARS-COV-2 VACCINE (12+)    03/19/2021   Imm Admin: MODERNA SARS-COV-2 VACCINE (12+)    02/19/2021  Imm Admin: MODERNA SARS-COV-2 VACCINE (12+)              Diabetes: Urine Protein Screening (Yearly) Next due on 6/7/2025 06/07/2024  Microalbumin Creat Ratio Urine (Clinic Collect)    05/11/2023  Microalbumin Creat Ratio Urine (Clinic Collect)              Diabetes: Monofilament / LE Exam (Yearly) Next due on 6/7/2025 06/07/2024  Smart[employer]: WORKFLOW - DIABETES - DIABETIC FOOT EXAM PERFORMED    06/07/2024  Diabetic Monofilament LE Exam    05/11/2023  Diabetic Monofilament LE Exam              Diabetes: Retinopathy Screening (Yearly) Next due on 7/11/2025 07/11/2024  RETINAL SCREENING RESULTS    05/16/2023  AMB EXTERNAL RETINAL SCREENING RESULTS    12/22/2021  REFERRAL FOR RETINAL SCREENING EXAM              IMM DTaP/Tdap/Td Vaccine (2 - Td or Tdap) Next due on 8/8/2027 08/08/2017  Imm Admin: Tdap Vaccine                      Completed or No Longer Recommended       Influenza Vaccine (Series Information) Completed      12/11/2024  Imm Admin: Influenza high-dose trivalent (PF)    12/29/2023  Imm Admin: Influenza Vaccine Adult HD    11/01/2021  Imm Admin: Influenza Vaccine Adult HD    10/22/2013  Imm Admin: Influenza Vaccine Adult HD              Zoster (Shingles) Vaccines (Series Information) Completed      01/12/2022  Imm Admin: Zoster Vaccine Recombinant (RZV) (SHINGRIX)    11/03/2021  Imm Admin: Zoster Vaccine Recombinant (RZV) (SHINGRIX)    06/09/2016  Imm Admin: Zoster Vaccine Live (ZVL) (Zostavax) - HISTORICAL DATA              Pneumococcal Vaccine: 50+ Years (Series Information) Completed      08/08/2017  Imm Admin: Pneumococcal polysaccharide vaccine (PPSV-23)    10/22/2013  Imm Admin: Pneumococcal Conjugate Vaccine (Prevnar/PCV-13)              Hepatitis A Vaccine (Hep A) (Series Information) Aged Out      No completion history exists for this topic.              Hepatitis B Vaccine (Hep B) (Series Information) Aged Out     No  completion history exists for this topic.              HPV Vaccines (Series Information) Aged Out     No completion history exists for this topic.              Polio Vaccine (Inactivated Polio) (Series Information) Aged Out     No completion history exists for this topic.              Meningococcal Immunization (Series Information) Aged Out     No completion history exists for this topic.              Mammogram  Discontinued        Frequency changed to Never automatically (Topic No Longer Applies)    2021  [id number]    2019  MA-SCREENING MAMMO BILAT W/TOMOSYNTHESIS W/CAD    2016  [id number]              Colorectal Cancer Screening  Discontinued        Frequency changed to Never automatically (Topic No Longer Applies)    2022  Colonoscopy (Done)    2022  REFERRAL TO GI FOR COLONOSCOPY    2016  REFERRAL TO GI FOR COLONOSCOPY                            Patient Care Team:  JOHNATHON Moeller as PCP - General (Nurse Practitioner Primary Care)  Debra Snowden M.D. (Psychiatry & Neurology Psychiatry)        Social History     Tobacco Use    Smoking status: Former     Current packs/day: 0.00     Average packs/day: 1 pack/day for 10.0 years (10.0 ttl pk-yrs)     Types: Cigarettes     Start date: 1966     Quit date: 1976     Years since quittin.6    Smokeless tobacco: Never   Vaping Use    Vaping status: Never Used   Substance Use Topics    Alcohol use: Not Currently     Comment: 1 glass every week or less    Drug use: No     Family History   Problem Relation Age of Onset    Alcohol/Drug Mother     Diabetes Mother     Diabetes Maternal Grandmother      She  has a past medical history of Anxiety, Arthritis, Dental disorder (2023), Depression, Diabetes (HCC) (), Gynecological disorder (), High cholesterol (2023), Kidney stone (2022), Pain, Panic attack, Renal disorder (2021), Spinal disorder, and Stroke ().   Past  "Surgical History:   Procedure Laterality Date    PB TOTAL KNEE ARTHROPLASTY Right 01/17/2023    Procedure: ARTHROPLASTY, KNEE, TOTAL, RIGHT;  Surgeon: Jaden Angeles M.D.;  Location: SURGERY [facility];  Service: Orthopedics    COLONOSCOPY  04/2022    COLONOSCOPY  1980    abdominal fissure removed    ARTHROPLASTY      BLOCK EPIDURAL STEROID INJECTION      COLON RESECTION      EYE SURGERY      FOOT SURGERY  1975    GANGLION EXCISION      R wrist     HAND SURGERY  1972    SHOULDER SURGERY  2005    TUBAL COAGULATION LAPAROSCOPIC BILATERAL         Exam:   /70   Pulse 83   Temp 35.9 °C (96.7 °F) (Temporal)   Resp 16   Ht 1.753 m (5' 9\")   Wt 79.3 kg (174 lb 12.8 oz)   SpO2 95%  Body mass index is 25.81 kg/m².    Hearing poor.    Dentition fair  Alert, oriented in no acute distress.  Eye contact is good, speech goal directed, affect calm    Assessment and Plan. The following treatment and monitoring plan is recommended:    Problem   Lumbar Spondylosis    Chronic and stable condition     Chronic Pain of Right Hip    Chronic and stable condition  Uses advil, tylenol and gabapentin and notes improvement   Has been back out in the yard racking   In past has had injections and interested to following up again on these  Denies weakness, worsening popping or clicking of joint     Hearing Loss    Chronic and stable condition   Was seen by audiology- offered hearing aids but too expensive  History of tinnitus in past but not with hearing loss  Denies any worsening issues       Left Foot Pain    Chronic and stable condition   Causes changes in relation ambulates  Pain to left gret toe  Walking on side of foot due to pain   Following with at  [facility]     Status Post Total Right Knee Replacement    Chronic and stable condition   Had surgery completed at [facility]  Right TKA 1/2023     Type 2 Diabetes Mellitus Without Complication, Without Long-Term Current Use of Insulin ([facility])    Chronic and stable condition "   Currently controlled with 1000 mg metformin daily, lifestyle, diet and exercise.   Retinal exam completed: Follows up with ophthalmologist tomorrow  LDL: New labs ordered  ACEi/ARB?  Not currently on any  Statin?  Refused  Urine Albumin/creatinine: Completed   Tobacco use: None  BP control: Controlled  110/70  Concomitant HTN?  None  A1C 7.0 2/13/2025  A1C due: 6 months-August 2025  Last monofilament exam-completed   Patient tolerates medications well with no significant or bothersome side effects.   Denies polyuria, polydipsia, polyphagia.         Sciatica of Left Side    Chronic and stable condition   History of lumbar spondylosis  Patient notes numbness and sciatica after long days of yard work.    She had previously seen a specialist, spinal surgeon for injections but did not that they improved      Former Smoker    Chronic and stable condition   quit smoking at age 32 yo  Was smoking 1 ppd for 13 years  Continues to no longer smoke       Elevated Ldl Cholesterol Level    Chronic and stable condition   Patient was previously started on Crestor but stopped due to SE taking this medication, she has no plan to get back on a statin at this time.    She does state that she plans to work on her diet and exercise.  New labs ordered     Pain of Both Hip Joints    Chronic and stable condition   Patient states that her pain is chronic in nature and tends to be on both hips.    She believes this is mostly related to old age versus anything else.   Continues with calcium and vitamin D supplementation as well as exercising     Moderate Episode of Recurrent Major Depressive Disorder (Hcc)    Chronic and stable condition   Takes prozac 40 mg daily   Follow up with psych twice a year   denies SI HI       Stress Incontinence    Chronic and stable condition   Wears a pad  Does not do kegel exercises or medications   Is able to tolerate at this time       Osteopenia of Multiple Sites    Chronic and stable condition   2017  osteopenia   2019 showed worsening osteopenia  2022 lumbar spine measures  T-score of -2.1, osteopenia.    left hip measures  T-score of -1.6, compatible with osteopenia.  Is physically active with walking, works on yard outside daily   Takes OTC vitamin d and calcium  Due for bone density scan 2027     Panic Attacks    Chronic and stable condition   Notes she has not had a full blown panic attack for a long time   Sees psychiatry -  [facility]  Driving a trigger for panic attacks sometimes  Psych manages her xanax, prozac and remeron   Takes 0.5mg alprazolam as needed and prozac 40 mg daily   Follows up with Psych twice yearly          Insomnia    Chronic and stable condition  Patient currently takes half 15 mg tablet for dose of 7.5 mg of mirtazapine.    She notes that controlled symptoms with this.            Services suggested: No services needed at this time  Health Care Screening: Age-appropriate preventive services recommended by USPTF and ACIP covered by Medicare were discussed today. Services ordered if indicated and agreed upon by the patient.  Referrals offered: Community-based lifestyle interventions to reduce health risks and promote self-management and wellness, fall prevention, nutrition, physical activity, tobacco-use cessation, weight loss, and mental health services as per orders if indicated.    Discussion today about general wellness and lifestyle habits:    Prevent falls and reduce trip hazards; Cautioned about securing or removing rugs.  Have a working fire alarm and carbon monoxide detector;   Engage in regular physical activity and social activities     Follow-up: Return for pending labs .

## 2025-02-22 ENCOUNTER — HOSPITAL ENCOUNTER (OUTPATIENT)
Dept: LAB | Facility: MEDICAL CENTER | Age: 80
End: 2025-02-22
Attending: NURSE PRACTITIONER
Payer: MEDICARE

## 2025-02-22 DIAGNOSIS — E11.65 UNCONTROLLED TYPE 2 DIABETES MELLITUS WITH HYPERGLYCEMIA (HCC): ICD-10-CM

## 2025-02-22 LAB
ALBUMIN SERPL BCP-MCNC: 4.4 G/DL (ref 3.2–4.9)
ALBUMIN/GLOB SERPL: 1.3 G/DL
ALP SERPL-CCNC: 88 U/L (ref 30–99)
ALT SERPL-CCNC: 38 U/L (ref 2–50)
ANION GAP SERPL CALC-SCNC: 14 MMOL/L (ref 7–16)
AST SERPL-CCNC: 32 U/L (ref 12–45)
BILIRUB SERPL-MCNC: 0.5 MG/DL (ref 0.1–1.5)
BUN SERPL-MCNC: 17 MG/DL (ref 8–22)
CALCIUM ALBUM COR SERPL-MCNC: 9.4 MG/DL (ref 8.5–10.5)
CALCIUM SERPL-MCNC: 9.7 MG/DL (ref 8.5–10.5)
CHLORIDE SERPL-SCNC: 104 MMOL/L (ref 96–112)
CHOLEST SERPL-MCNC: 208 MG/DL (ref 100–199)
CO2 SERPL-SCNC: 22 MMOL/L (ref 20–33)
CREAT SERPL-MCNC: 0.91 MG/DL (ref 0.5–1.4)
FASTING STATUS PATIENT QL REPORTED: NORMAL
GFR SERPLBLD CREATININE-BSD FMLA CKD-EPI: 64 ML/MIN/1.73 M 2
GLOBULIN SER CALC-MCNC: 3.5 G/DL (ref 1.9–3.5)
GLUCOSE SERPL-MCNC: 146 MG/DL (ref 65–99)
HDLC SERPL-MCNC: 56 MG/DL
LDLC SERPL CALC-MCNC: 123 MG/DL
POTASSIUM SERPL-SCNC: 4.3 MMOL/L (ref 3.6–5.5)
PROT SERPL-MCNC: 7.9 G/DL (ref 6–8.2)
SODIUM SERPL-SCNC: 140 MMOL/L (ref 135–145)
TRIGL SERPL-MCNC: 143 MG/DL (ref 0–149)

## 2025-02-22 PROCEDURE — 80061 LIPID PANEL: CPT

## 2025-02-22 PROCEDURE — 80053 COMPREHEN METABOLIC PANEL: CPT

## 2025-02-22 PROCEDURE — 36415 COLL VENOUS BLD VENIPUNCTURE: CPT

## 2025-03-06 ENCOUNTER — RESULTS FOLLOW-UP (OUTPATIENT)
Dept: MEDICAL GROUP | Facility: PHYSICIAN GROUP | Age: 80
End: 2025-03-06
Payer: MEDICARE

## 2025-04-04 DIAGNOSIS — E11.65 UNCONTROLLED TYPE 2 DIABETES MELLITUS WITH HYPERGLYCEMIA (HCC): ICD-10-CM

## 2025-07-04 DIAGNOSIS — E11.65 UNCONTROLLED TYPE 2 DIABETES MELLITUS WITH HYPERGLYCEMIA (HCC): ICD-10-CM

## 2025-07-07 NOTE — TELEPHONE ENCOUNTER
Received request via: Patient    Was the patient seen in the last year in this department? Yes    Does the patient have an active prescription (recently filled or refills available) for medication(s) requested? No    Pharmacy Name: [facility]    Does the patient have detention Plus and need 100-day supply? (This applies to ALL medications) Patient does not have SCP

## (undated) DEVICE — SUCTION INSTRUMENT YANKAUER BULBOUS TIP W/O VENT (50EA/CA)

## (undated) DEVICE — SUTURE GENERAL

## (undated) DEVICE — SUTURE 3-0 MONOCRYL PLUS PS-1 - 27 INCH (36/BX)

## (undated) DEVICE — SUTURE 2-0 MONOCRYL PLUS UNDYED CT-1 1 X 36 (36EA/BX)"

## (undated) DEVICE — DRAPE IOBAN II INCISE 23X17 - (10EA/BX 4BX/CA)

## (undated) DEVICE — LACTATED RINGERS INJ 1000 ML - (14EA/CA 60CA/PF)

## (undated) DEVICE — SUTURE 5 ETHIBOND V-37 (12PK/BX)

## (undated) DEVICE — STOCKINETTE IMPERVIOUS 12X48 - STERILELF (10/CA)"

## (undated) DEVICE — DRAPE LARGE 3 QUARTER - (20/CA)

## (undated) DEVICE — BLADE SAGITTAL 34MM

## (undated) DEVICE — Device

## (undated) DEVICE — SENSOR OXIMETER ADULT SPO2 RD SET (20EA/BX)

## (undated) DEVICE — TUBING CLEARLINK DUO-VENT - C-FLO (48EA/CA)

## (undated) DEVICE — TIP INTPLS HFLO ML ORFC BTRY - (12/CS)  FOR SURGILAV

## (undated) DEVICE — HANDPIECE 10FT INTPLS SCT PLS IRRIGATION HAND CONTROL SET (6/PK)

## (undated) DEVICE — GLOVE BIOGEL PI INDICATOR SZ 8.0 SURGICAL PF LF -(50/BX 4BX/CA)

## (undated) DEVICE — GLOVE BIOGEL SZ 8 SURGICAL PF LTX - (50PR/BX 4BX/CA)

## (undated) DEVICE — CANISTER SUCTION RIGID RED 1500CC (40EA/CA)

## (undated) DEVICE — PACK TOTAL KNEE  (1/CA)

## (undated) DEVICE — HUMID-VENT HEAT AND MOISTURE EXCHANGE- (50/BX)

## (undated) DEVICE — PAD PREP 24 X 48 CUFFED - (100/CA)

## (undated) DEVICE — SODIUM CHL IRRIGATION 0.9% 1000ML (12EA/CA)

## (undated) DEVICE — BANDAGE ELASTIC STERILE VELCRO 6 X 5 YDS (25EA/CA)

## (undated) DEVICE — BLADE SAGITTAL SAW DUAL CUT 25.0 X 90.0 X 1.27MM (1/EA)

## (undated) DEVICE — BLADE 90X18X1.27MM SAW SAGITTAL

## (undated) DEVICE — PIN HEADLESS FLUTED 3.2MM X 89MM

## (undated) DEVICE — DERMABOND ADVANCED - (12EA/BX)

## (undated) DEVICE — GLOVE BIOGEL SZ 7.5 SURGICAL PF LTX - (50PR/BX 4BX/CA)

## (undated) DEVICE — GLOVE BIOGEL PI INDICATOR SZ 7.0 SURGICAL PF LF - (50/BX 4BX/CA)

## (undated) DEVICE — SET EXTENSION WITH 2 PORTS (48EA/CA) ***PART #2C8610 IS A SUBSTITUTE*****

## (undated) DEVICE — GLOVE BIOGEL PI INDICATOR SZ 7.5 SURGICAL PF LF -(50/BX 4BX/CA)

## (undated) DEVICE — GLOVE BIOGEL SZ 7 SURGICAL PF LTX - (50PR/BX 4BX/CA)